# Patient Record
Sex: FEMALE | Race: OTHER | NOT HISPANIC OR LATINO | Employment: OTHER | URBAN - METROPOLITAN AREA
[De-identification: names, ages, dates, MRNs, and addresses within clinical notes are randomized per-mention and may not be internally consistent; named-entity substitution may affect disease eponyms.]

---

## 2017-03-01 ENCOUNTER — NURSE TRIAGE (OUTPATIENT)
Dept: ADMINISTRATIVE | Facility: CLINIC | Age: 67
End: 2017-03-01

## 2017-03-01 NOTE — TELEPHONE ENCOUNTER
Reason for Disposition   Information only question and nurse able to answer    Protocols used: ST NO PROTOCOL AVAILABLE - INFORMATION ONLY-A-[place]    Patient is a former [facility] patient and is requesting a model number for her stent that was done in 2013.  Advised patient that the hospital is closed and [employer] record management was handling the doctors and not the patient.  Advised patient to try to contact Dr. Keanu Higgins's office.

## 2023-08-09 ENCOUNTER — OFFICE VISIT (OUTPATIENT)
Dept: FAMILY MEDICINE | Facility: CLINIC | Age: 73
End: 2023-08-09
Payer: MEDICARE

## 2023-08-09 VITALS
TEMPERATURE: 98 F | WEIGHT: 160 LBS | SYSTOLIC BLOOD PRESSURE: 138 MMHG | BODY MASS INDEX: 28.35 KG/M2 | DIASTOLIC BLOOD PRESSURE: 75 MMHG | HEIGHT: 63 IN | HEART RATE: 65 BPM | OXYGEN SATURATION: 96 % | RESPIRATION RATE: 18 BRPM

## 2023-08-09 DIAGNOSIS — Z00.00 ANNUAL PHYSICAL EXAM: ICD-10-CM

## 2023-08-09 DIAGNOSIS — I50.9 CONGESTIVE HEART FAILURE, UNSPECIFIED HF CHRONICITY, UNSPECIFIED HEART FAILURE TYPE: ICD-10-CM

## 2023-08-09 DIAGNOSIS — I10 PRIMARY HYPERTENSION: ICD-10-CM

## 2023-08-09 DIAGNOSIS — I65.03 STENOSIS OF BOTH VERTEBRAL ARTERIES: ICD-10-CM

## 2023-08-09 DIAGNOSIS — G40.309 GENERALIZED CONVULSIVE EPILEPSY: ICD-10-CM

## 2023-08-09 DIAGNOSIS — I25.10 ARTERIOSCLEROSIS OF CORONARY ARTERY: Primary | ICD-10-CM

## 2023-08-09 DIAGNOSIS — R26.81 GAIT INSTABILITY: ICD-10-CM

## 2023-08-09 PROCEDURE — 99999 PR PBB SHADOW E&M-EST. PATIENT-LVL V: CPT | Mod: PBBFAC,,, | Performed by: STUDENT IN AN ORGANIZED HEALTH CARE EDUCATION/TRAINING PROGRAM

## 2023-08-09 PROCEDURE — 99215 OFFICE O/P EST HI 40 MIN: CPT | Mod: PBBFAC,PO | Performed by: STUDENT IN AN ORGANIZED HEALTH CARE EDUCATION/TRAINING PROGRAM

## 2023-08-09 PROCEDURE — 99387 INIT PM E/M NEW PAT 65+ YRS: CPT | Mod: S$PBB,GZ,, | Performed by: STUDENT IN AN ORGANIZED HEALTH CARE EDUCATION/TRAINING PROGRAM

## 2023-08-09 PROCEDURE — 99999 PR PBB SHADOW E&M-EST. PATIENT-LVL V: ICD-10-PCS | Mod: PBBFAC,,, | Performed by: STUDENT IN AN ORGANIZED HEALTH CARE EDUCATION/TRAINING PROGRAM

## 2023-08-09 PROCEDURE — 99387 PR PREVENTIVE VISIT,NEW,65 & OVER: ICD-10-PCS | Mod: S$PBB,GZ,, | Performed by: STUDENT IN AN ORGANIZED HEALTH CARE EDUCATION/TRAINING PROGRAM

## 2023-08-09 NOTE — PROGRESS NOTES
Problem List Items Addressed This Visit          Neuro    Generalized convulsive epilepsy    Overview     1 episode several years ago, unknown etiology ?TIA/CVA  She hasn't been on antiepileptics   -referral to neurology     MRA Brain 2022 at [place]   IMPRESSION:   No large vessel occlusion.     Focal stenoses in the cavernous segment of the right internal carotid artery, supraclinoid segment of the left internal carotid artery, proximal M1 segment of the right middle cerebral artery, distal V4 segment of the right vertebral artery, and proximal   basilar artery. Tandem stenoses are also noted in the posterior cerebral arteries bilaterally.          MRI brain 2022 at [facility]   Impression    No acute intracranial abnormality.     Mild deep white matter leukomalacia of chronic microvascular disease.     Development of a right mastoid effusion.                 Stenosis of both vertebral arteries    Overview     ?TIA/CVA  - referral to neuro radiology  - referral to neurology     MRA Brain 2022 at [facility]   IMPRESSION:   No large vessel occlusion.     Focal stenoses in the cavernous segment of the right internal carotid artery, supraclinoid segment of the left internal carotid artery, proximal M1 segment of the right middle cerebral artery, distal V4 segment of the right vertebral artery, and proximal   basilar artery. Tandem stenoses are also noted in the posterior cerebral arteries bilaterally.          MRI brain 2022 at [facility]   Impression    No acute intracranial abnormality.     Mild deep white matter leukomalacia of chronic microvascular disease.     Development of a right mastoid effusion.               Relevant Orders    Ambulatory referral/consult to Interventional Radiology    US Carotid Bilateral       Cardiac/Vascular    CCF (congestive cardiac failure)    Relevant Orders    Ambulatory referral/consult to Cardiology    US Carotid Bilateral    Arteriosclerosis of coronary artery - Primary     Overview     Intolerant of statins   Referral to cards          Relevant Orders    Ambulatory referral/consult to Cardiology    Hypertension    Overview     -at goal today  - Current Hypertension Medications:   Hypertension Medications               amlodipine (NORVASC) 5 MG tablet Take 1 tablet (5 mg total) by mouth once daily.    cloNIDine (CATAPRES) 0.2 MG tablet Take 1 tablet (0.2 mg total) by mouth as needed.    losartan (COZAAR) 100 MG tablet 1 po daily    metoprolol succinate (TOPROL XL) 50 MG 24 hr tablet 1 and 1/2 in the AM.  1 and 1/2 at night.    NITROSTAT 0.4 mg SL tablet    -continue lifestyle modification with low sodium diet and exercise   -discussed hypertension disease course and importance of treating high blood pressure  -patient understood and advised of risk of untreated blood pressure.  ER precautions were given   for symptoms of hypertensive urgency and emergency.              Other    Annual physical exam    Overview     Complete history and physical was completed today.    Complete and thorough medication reconciliation was performed. Discussed risks and benefits of medications.    Advised patient on orders and health maintenance.    Continue current medications listed on your summary sheet.    All questions were answered.   Follow up as planned or prn             Other Visit Diagnoses       Gait instability        Relevant Medications    walker Misc    Other Relevant Orders    Ambulatory referral/consult to Neurology    Ambulatory referral/consult to Physical/Occupational Therapy              Patient ID: Paola Wood is a 73 y.o. female.    Chief Complaint:  establish care    Patient is here to establish care. Has a hx of  has a past medical history of Allergy, AR (allergic rhinitis), Asthma, CAD (coronary artery disease), CHF (congestive heart failure), Chronic pain, Hypertension, and Seizures.     She is here with her daughter.     Reports 1 episode several years ago, unknown etiology  ?TIA/CVA  She hasn't been on antiepileptics.    Otherwise, patient has been feeling well. No additional concerns. Denies nausea, vomiting, fevers, chills, abdominal pain, fatigue.     Health Maintenance Topics with due status: Not Due       Topic Last Completion Date    Colorectal Cancer Screening 04/13/2015    Lipid Panel 01/01/2023    Influenza Vaccine Not Due        ==============================================  History reviewed.   Health Maintenance Due   Topic Date Due    Hepatitis C Screening  Never done    TETANUS VACCINE  Never done    High Dose Statin  Never done    DEXA Scan  Never done    Pneumococcal Vaccines (Age 65+) (1 - PCV) Never done    COVID-19 Vaccine (4 - Pfizer series) 01/05/2022    Mammogram  08/23/2023       Past Medical History:  Past Medical History:   Diagnosis Date    Allergy     AR (allergic rhinitis)     Asthma     CAD (coronary artery disease)     CHF (congestive heart failure)     Chronic pain     neck/back/arm/knee    Hypertension     Seizures      Past Surgical History:   Procedure Laterality Date    CATARACT EXTRACTION      HYSTERECTOMY  1983    partial (Ovaries removed 1998)    OOPHORECTOMY  1998     Review of patient's allergies indicates:   Allergen Reactions    Ace inhibitors Swelling     Other reaction(s): tongue swelling  Other reaction(s): Other (See Comments)  Other reaction(s): tongue swelling      Bacitracin     Ezetimibe     Lisinopril     Neomycin     Niacin     Polymyxin b     Atorvastatin Rash    Rosuvastatin Nausea And Vomiting    Statins-hmg-coa reductase inhibitors Nausea And Vomiting     Current Outpatient Medications on File Prior to Visit   Medication Sig Dispense Refill    amlodipine (NORVASC) 5 MG tablet Take 1 tablet (5 mg total) by mouth once daily. 90 tablet 1    aspirin (ECOTRIN) 81 MG EC tablet Take 81 mg by mouth once daily.      butalbital-aspirin-caffeine -40 mg (FIORINAL) -40 mg Cap Take 1 capsule by mouth 2 (two) times daily as needed.  30 capsule 0    cloNIDine (CATAPRES) 0.2 MG tablet Take 1 tablet (0.2 mg total) by mouth as needed. 30 tablet 0    clopidogrel (PLAVIX) 75 mg tablet Take 75 mg by mouth once daily.  5    cyproheptadine (PERIACTIN) 4 mg tablet Take 4 mg by mouth 3 (three) times daily as needed.  5    folic acid (FOLVITE) 1 MG tablet Take 1 tablet (1 mg total) by mouth once daily. FOLIC ACID 1 MG TABS 90 tablet 1    LATISSE 0.03 % ophthalmic solution APPLY with applicator AT BEDTIME  2    losartan (COZAAR) 100 MG tablet 1 po daily 90 tablet 1    metoprolol succinate (TOPROL XL) 50 MG 24 hr tablet 1 and 1/2 in the AM.  1 and 1/2 at night. 270 tablet 1    mirtazapine (REMERON) 7.5 MG Tab Take 1 tablet (7.5 mg total) by mouth every evening. 90 tablet 0    morphine (MS CONTIN) 30 MG 12 hr tablet Take 1 tablet by mouth every 12 (twelve) hours.  0    NITROSTAT 0.4 mg SL tablet   2    oxycodone (OXYCONTIN) 10 mg 12 hr tablet Take 10 mg by mouth 2 (two) times daily.  0    oxyCODONE-acetaminophen (PERCOCET)  mg per tablet Take 1 tablet by mouth every 4 (four) hours as needed for Pain. 8 tablet 0    pantoprazole (PROTONIX) 40 MG tablet Take 1 tablet (40 mg total) by mouth once daily. PROTONIX 40 MG TBEC 90 tablet 1    zolpidem (AMBIEN) 5 MG Tab 1 po q hs for one week then 1/2 po q hs for one week then 1/4 po q hs prn.  Wean off medication. 30 tablet 0     No current facility-administered medications on file prior to visit.     Social History     Socioeconomic History    Marital status:    Tobacco Use    Smoking status: Never    Smokeless tobacco: Never   Substance and Sexual Activity    Alcohol use: No    Drug use: No     Family History   Problem Relation Age of Onset    Heart disease Mother     Heart disease Father     No Known Problems Maternal Grandmother     No Known Problems Maternal Grandfather     No Known Problems Paternal Grandmother     No Known Problems Paternal Grandfather     Breast cancer Sister 53          Review of  Systems   12 point review of systems per hpi, otherwise negative         Objective:    Nursing note and vitals reviewed.  Vitals:    08/09/23 0942   BP: 138/75   Pulse: 65   Resp: 18   Temp: 97.8 °F (36.6 °C)     Body mass index is 28.34 kg/m².     Physical Exam   Constitutional: SHE is oriented to person, place, and time. She appears well-developed and well-nourished. No distress.   HENT: WNL  Head: Normocephalic and atraumatic.   Eyes: Pupils are equal, round, and reactive to light. EOM are normal.   Neck: Normal range of motion. Neck supple.   Cardiovascular: Normal rate, regular rhythm, normal heart sounds and intact distal pulses.   No murmur heard.  Pulmonary/Chest: Effort normal and breath sounds normal. No respiratory distress. She has no wheezes.   GI: soft, non distended, no ttp, no rebound/guarding  Musculoskeletal: Normal range of motion. She exhibits no edema.   Neurological: She is alert and oriented to person, place, and time. No cranial nerve deficit.   Skin: Skin is warm and dry. Capillary refill takes less than 2 seconds.   Psychiatric: She has a normal mood and affect. Her behavior is normal.           Grace Duron MD    We Offer Telehealth & Same Day Appointments!   Book your Telehealth appointment with me through my nurse or   Clinic appointments on [employer]!  [phone]     To Schedule appointments online, go to [employer]: [email or web address]

## 2023-08-11 PROBLEM — I65.03 STENOSIS OF BOTH VERTEBRAL ARTERIES: Status: ACTIVE | Noted: 2023-08-11

## 2023-08-14 PROBLEM — Z00.00 ANNUAL PHYSICAL EXAM: Status: ACTIVE | Noted: 2023-08-14

## 2023-08-18 DIAGNOSIS — Z76.89 ESTABLISHING CARE WITH NEW DOCTOR, ENCOUNTER FOR: Primary | ICD-10-CM

## 2023-08-23 ENCOUNTER — TELEPHONE (OUTPATIENT)
Dept: CARDIOLOGY | Facility: CLINIC | Age: 73
End: 2023-08-23
Payer: MEDICARE

## 2023-08-23 NOTE — TELEPHONE ENCOUNTER
----- Message from Mahnaz Tatum sent at 8/23/2023 11:15 AM CDT -----  Regarding: call back to schedule additional testing  Type: Patient Call Back    Who called: Paola    What is the request in detail: the patient is requesting a call back from the staff. She stated that she received a call to reschedule some additional testing, but is not 100% sure what additional testing is needed. Please return call at earliest convenience.    Can the clinic reply by [facility]?no     Would the patient rather a call back or a response via [facility]? Call back    Best call back number:[phone]

## 2023-08-23 NOTE — TELEPHONE ENCOUNTER
Followed up with Paola, patient was calling to get her ultrasound appointment rescheduled, but after some discussion decided to have her daughter call me back to schedule her appointment.

## 2023-09-08 PROCEDURE — G0180 MD CERTIFICATION HHA PATIENT: HCPCS | Mod: ,,, | Performed by: STUDENT IN AN ORGANIZED HEALTH CARE EDUCATION/TRAINING PROGRAM

## 2023-09-08 PROCEDURE — G0180 PR HOME HEALTH MD CERTIFICATION: ICD-10-PCS | Mod: ,,, | Performed by: STUDENT IN AN ORGANIZED HEALTH CARE EDUCATION/TRAINING PROGRAM

## 2023-09-12 ENCOUNTER — TELEPHONE (OUTPATIENT)
Dept: FAMILY MEDICINE | Facility: CLINIC | Age: 73
End: 2023-09-12
Payer: MEDICARE

## 2023-09-12 NOTE — TELEPHONE ENCOUNTER
I have attempted to contact this patient by phone with the following results: no answer, left message to return my call on answering machine.

## 2023-09-12 NOTE — TELEPHONE ENCOUNTER
----- Message from Jaimie Mcnulty sent at 9/12/2023  8:44 AM CDT -----  Contact: NEEL  .Type:  Patient Returning Call    Who Called: neel   Who Left Message for Patient:nurse  Does the patient know what this is regarding?:no  Would the patient rather a call back or a response via [facility]? Call   Best Call Back Number: .[phone]

## 2023-09-18 ENCOUNTER — TELEPHONE (OUTPATIENT)
Dept: NEUROLOGY | Facility: CLINIC | Age: 73
End: 2023-09-18
Payer: MEDICARE

## 2023-09-18 NOTE — TELEPHONE ENCOUNTER
----- Message from Marissa Anaya RN sent at 9/18/2023 10:50 AM CDT -----  Regarding: [facility] ER visit f/u 9/15/2023 Dx: Confusion, AMS  Please call patient's daughter, Alice Wood at [phone] to schedule an appointment for the patient. She has a NeuroVascular appointment on the [facility] on 9/26/2023 but ER Physician and family are requesting a Neurology appointment for her new onset confusion/AMS.     Thanks,    Marissa Anaya, RN, BSN  ED Navigator/Case Management  [phone]

## 2023-09-27 ENCOUNTER — DOCUMENT SCAN (OUTPATIENT)
Dept: HOME HEALTH SERVICES | Facility: HOSPITAL | Age: 73
End: 2023-09-27
Payer: MEDICARE

## 2023-09-28 ENCOUNTER — DOCUMENT SCAN (OUTPATIENT)
Dept: HOME HEALTH SERVICES | Facility: HOSPITAL | Age: 73
End: 2023-09-28
Payer: MEDICARE

## 2023-09-29 ENCOUNTER — EXTERNAL HOME HEALTH (OUTPATIENT)
Dept: HOME HEALTH SERVICES | Facility: HOSPITAL | Age: 73
End: 2023-09-29
Payer: MEDICARE

## 2023-10-20 ENCOUNTER — TELEPHONE (OUTPATIENT)
Dept: FAMILY MEDICINE | Facility: CLINIC | Age: 73
End: 2023-10-20
Payer: MEDICARE

## 2023-10-20 NOTE — TELEPHONE ENCOUNTER
----- Message from Yaima Price sent at 10/20/2023 11:58 AM CDT -----  Contact: [phone]    Patient: Paola Wood-    Reason: The patient is requesting a call back from the nurse to get assistance with scheduling an     appointment.    Comments: Please call the patient back to advise.

## 2023-10-25 ENCOUNTER — OFFICE VISIT (OUTPATIENT)
Dept: FAMILY MEDICINE | Facility: CLINIC | Age: 73
End: 2023-10-25
Payer: COMMERCIAL

## 2023-10-25 VITALS
BODY MASS INDEX: 27.23 KG/M2 | OXYGEN SATURATION: 99 % | DIASTOLIC BLOOD PRESSURE: 83 MMHG | WEIGHT: 153.69 LBS | HEART RATE: 58 BPM | RESPIRATION RATE: 18 BRPM | SYSTOLIC BLOOD PRESSURE: 135 MMHG | HEIGHT: 63 IN | TEMPERATURE: 98 F

## 2023-10-25 DIAGNOSIS — G89.29 OTHER CHRONIC PAIN: ICD-10-CM

## 2023-10-25 DIAGNOSIS — Z12.31 ENCOUNTER FOR SCREENING MAMMOGRAM FOR MALIGNANT NEOPLASM OF BREAST: ICD-10-CM

## 2023-10-25 DIAGNOSIS — Z78.0 POSTMENOPAUSAL: ICD-10-CM

## 2023-10-25 DIAGNOSIS — R29.6 RECURRENT FALLS: ICD-10-CM

## 2023-10-25 DIAGNOSIS — I25.10 ARTERIOSCLEROSIS OF CORONARY ARTERY: Primary | ICD-10-CM

## 2023-10-25 DIAGNOSIS — Z78.9 STATIN INTOLERANCE: ICD-10-CM

## 2023-10-25 DIAGNOSIS — Z23 IMMUNIZATION DUE: ICD-10-CM

## 2023-10-25 PROCEDURE — 99999 PR PBB SHADOW E&M-EST. PATIENT-LVL V: ICD-10-PCS | Mod: PBBFAC,,, | Performed by: STUDENT IN AN ORGANIZED HEALTH CARE EDUCATION/TRAINING PROGRAM

## 2023-10-25 PROCEDURE — 99999PBSHW FLU VACCINE - QUADRIVALENT - ADJUVANTED: Mod: PBBFAC,,,

## 2023-10-25 PROCEDURE — 99214 OFFICE O/P EST MOD 30 MIN: CPT | Mod: S$PBB,,, | Performed by: STUDENT IN AN ORGANIZED HEALTH CARE EDUCATION/TRAINING PROGRAM

## 2023-10-25 PROCEDURE — 99999PBSHW FLU VACCINE - QUADRIVALENT - ADJUVANTED: ICD-10-PCS | Mod: PBBFAC,,,

## 2023-10-25 PROCEDURE — 99214 PR OFFICE/OUTPT VISIT, EST, LEVL IV, 30-39 MIN: ICD-10-PCS | Mod: S$PBB,,, | Performed by: STUDENT IN AN ORGANIZED HEALTH CARE EDUCATION/TRAINING PROGRAM

## 2023-10-25 PROCEDURE — 99999PBSHW PR PBB SHADOW TECHNICAL ONLY FILED TO HB: Mod: PBBFAC,PO,,

## 2023-10-25 PROCEDURE — 99215 OFFICE O/P EST HI 40 MIN: CPT | Mod: PBBFAC,PO | Performed by: STUDENT IN AN ORGANIZED HEALTH CARE EDUCATION/TRAINING PROGRAM

## 2023-10-25 PROCEDURE — 99999 PR PBB SHADOW E&M-EST. PATIENT-LVL V: CPT | Mod: PBBFAC,,, | Performed by: STUDENT IN AN ORGANIZED HEALTH CARE EDUCATION/TRAINING PROGRAM

## 2023-10-25 PROCEDURE — G0008 ADMIN INFLUENZA VIRUS VAC: HCPCS | Mod: PBBFAC,PO

## 2023-10-25 RX ORDER — AMITRIPTYLINE HYDROCHLORIDE 150 MG/1
150 TABLET ORAL NIGHTLY
COMMUNITY
Start: 2023-09-06

## 2023-10-25 RX ORDER — CHLORTHALIDONE 25 MG/1
25 TABLET ORAL EVERY MORNING
COMMUNITY
Start: 2023-10-03

## 2023-10-25 RX ORDER — HYDRALAZINE HYDROCHLORIDE 50 MG/1
50 TABLET, FILM COATED ORAL 3 TIMES DAILY
COMMUNITY
Start: 2023-09-06

## 2023-10-25 RX ORDER — ISOSORBIDE MONONITRATE 30 MG/1
30 TABLET, EXTENDED RELEASE ORAL EVERY MORNING
COMMUNITY
Start: 2023-09-07

## 2023-10-25 RX ORDER — METHOCARBAMOL 500 MG/1
500 TABLET, FILM COATED ORAL 4 TIMES DAILY
COMMUNITY
Start: 2023-10-24 | End: 2023-11-03

## 2023-10-25 RX ORDER — POTASSIUM CHLORIDE 20 MEQ/1
20 TABLET, EXTENDED RELEASE ORAL DAILY
COMMUNITY
Start: 2023-09-06

## 2023-10-25 RX ORDER — OXYCODONE AND ACETAMINOPHEN 10; 325 MG/1; MG/1
1 TABLET ORAL EVERY 4 HOURS PRN
COMMUNITY

## 2023-10-25 RX ORDER — FUROSEMIDE 20 MG/1
20 TABLET ORAL
COMMUNITY
Start: 2023-10-03

## 2023-10-25 RX ORDER — GEMFIBROZIL 600 MG/1
600 TABLET, FILM COATED ORAL 2 TIMES DAILY
COMMUNITY
Start: 2023-10-03 | End: 2024-02-07

## 2023-10-25 RX ORDER — TOPIRAMATE SPINKLE 15 MG/1
15 CAPSULE ORAL EVERY 12 HOURS
COMMUNITY
Start: 2023-10-03 | End: 2023-10-25

## 2023-10-25 RX ORDER — LUBIPROSTONE 24 UG/1
1 CAPSULE ORAL 2 TIMES DAILY
COMMUNITY
End: 2023-10-25

## 2023-10-25 NOTE — PROGRESS NOTES
Problem List Items Addressed This Visit          Neuro    Chronic pain    Overview     Follows with dr. Medina for chronic back pain             Cardiac/Vascular    Arteriosclerosis of coronary artery - Primary    Overview     Intolerant of statins   Referral to cards          Relevant Medications    alirocumab (PRALUENT PEN) 75 mg/mL PnIj    Other Relevant Orders    Ambulatory referral/consult to Cardiology       Other    Recurrent falls    Overview     Reports weekly falls over past few months  Referral to PT home health  Has walker and cane; however, feels they do not support her well  She is on plavix for cad s/p pci. She needs to discuss plavix use with cards  Ct head oct 2023 unremarkable    We reviewed meds: she opposed to dc'ing amitriptyline for sleep, I advised I would not be filling narcotic pain meds in setting of recurrent falls. Clonidine makes her sleepy (takes for insomnia; however, had been taking bid). She will take clonidine hs now         Relevant Orders    Ambulatory referral/consult to Home Health    Statin intolerance    Overview     -chronic condition. Statin intolerance. Sent praluent  Referral to cards to follow up      Hyperlipidemia Medications               alirocumab (PRALUENT PEN) 75 mg/mL PnIj Inject 1 mL (75 mg total) into the skin every 14 (fourteen) days.    gemfibroziL (LOPID) 600 MG tablet Take 600 mg by mouth 2 (two) times daily.       Lab Results   Component Value Date    CHOL 381 (H) 08/27/2023    CHOL 251 (H) 01/01/2023    CHOL 274 (H) 06/21/2022     Lab Results   Component Value Date    HDL 53 08/27/2023    HDL 48 01/01/2023    HDL 39 06/21/2022     Lab Results   Component Value Date    LDLCALC 290 (H) 08/27/2023    LDLCALC 183 (H) 01/01/2023    LDLCALC 185 (H) 06/21/2022     Lab Results   Component Value Date    TRIG 191 08/27/2023    TRIG 99 01/01/2023    TRIG 252 (H) 06/21/2022     Lab Results   Component Value Date    CHOLHDL 7.2 (H) 08/27/2023    CHOLHDL 5.2 (H)  "01/01/2023    CHOLHDL 7.0 (H) 06/21/2022          The ASCVD Risk score (Daniel JUNE, et al., 2019) failed to calculate for the following reasons:    The valid total cholesterol range is 130 to 320 mg/dL           Relevant Medications    alirocumab (PRALUENT PEN) 75 mg/mL PnIj    Other Relevant Orders    Ambulatory referral/consult to Cardiology     Other Visit Diagnoses       Immunization due        Relevant Orders    Influenza (FLUAD) - Quadrivalent (Adjuvanted) *Preferred* (65+) (PF) (Completed)    Postmenopausal        Relevant Orders    DXA Bone Density Axial Skeleton 1 or more sites    Encounter for screening mammogram for malignant neoplasm of breast        Relevant Orders    Mammo Digital Screening Bilat w/ Marciano                Patient ID: Paola Wood is a 73 y.o. female.    Chief Complaint:  ED follow up    Has a hx of  has a past medical history of Allergy, AR (allergic rhinitis), Asthma, CAD (coronary artery disease), CHF (congestive heart failure), Chronic pain, Hypertension, and Seizures.     Recurrent falls, most recently last week. She was seen at [place] [facility]    Per chart review:  "73-year-old history of TIA, hypertension, hyperlipidemia, presents for evaluation of neck pain. Patient suffers from frequent falls. She fell 6 days ago and has been having pain to her neck. She has a history of previous neck surgery. Also she has been having difficulty processing her thoughts. She tells me she is on aspirin and Plavix. No new focal weakness or numbness...73-year-old patient presented for evaluation of head pain and neck pain following a fall 6 days prior. CT scans were obtained which were unremarkable. On physical exam patient had no focal deficits. Physical exam otherwise unremarkable. Suspect injuries to be soft tissue. Patient will be prescribed a muscle relaxer and she can follow-up with her neck doctor."      Reports 1 episode sz several years ago, unknown etiology ?TIA/CVA  She hasn't been on " antiepileptics.    Polypharmacy: amitriptyline for insomnia, had been taking clonidine bid- will take prn htn    Otherwise, patient has been feeling well. No additional concerns. Denies nausea, vomiting, fevers, chills, abdominal pain.      Health Maintenance Topics with due status: Not Due       Topic Last Completion Date    Colorectal Cancer Screening 04/13/2015    Lipid Panel 08/27/2023        ==============================================  History reviewed.   Health Maintenance Due   Topic Date Due    Hepatitis C Screening  Never done    TETANUS VACCINE  Never done    High Dose Statin  Never done    DEXA Scan  Never done    Pneumococcal Vaccines (Age 65+) (1 - PCV) Never done    Mammogram  08/23/2023    COVID-19 Vaccine (7 - 2023-24 season) 09/01/2023       Past Medical History:  Past Medical History:   Diagnosis Date    Allergy     AR (allergic rhinitis)     Asthma     CAD (coronary artery disease)     CHF (congestive heart failure)     Chronic pain     neck/back/arm/knee    Hypertension     Seizures      Past Surgical History:   Procedure Laterality Date    CATARACT EXTRACTION      HYSTERECTOMY  1983    partial (Ovaries removed 1998)    OOPHORECTOMY  1998     Review of patient's allergies indicates:   Allergen Reactions    Ace inhibitors Swelling     Other reaction(s): tongue swelling  Other reaction(s): Other (See Comments)  Other reaction(s): tongue swelling      Bacitracin     Ezetimibe     Lisinopril      Other reaction(s): Not available    Neomycin     Niacin     Polymyxin b     Atorvastatin Rash    Rosuvastatin Nausea And Vomiting    Statins-hmg-coa reductase inhibitors Nausea And Vomiting     Other reaction(s): Not available     Current Outpatient Medications on File Prior to Visit   Medication Sig Dispense Refill    amitriptyline (ELAVIL) 150 MG Tab Take 150 mg by mouth every evening.      aspirin (ECOTRIN) 81 MG EC tablet Take 81 mg by mouth once daily.      butalbital-aspirin-caffeine -40 mg  (FIORINAL) -40 mg Cap Take 1 capsule by mouth 2 (two) times daily as needed. 30 capsule 0    chlorthalidone (HYGROTEN) 25 MG Tab Take 25 mg by mouth every morning.      cloNIDine (CATAPRES) 0.2 MG tablet Take 1 tablet (0.2 mg total) by mouth as needed. 30 tablet 0    clopidogrel (PLAVIX) 75 mg tablet Take 75 mg by mouth once daily.  5    cyproheptadine (PERIACTIN) 4 mg tablet Take 4 mg by mouth 3 (three) times daily as needed.  5    folic acid (FOLVITE) 1 MG tablet Take 1 tablet (1 mg total) by mouth once daily. FOLIC ACID 1 MG TABS 90 tablet 1    furosemide (LASIX) 20 MG tablet Take 20 mg by mouth.      gemfibroziL (LOPID) 600 MG tablet Take 600 mg by mouth 2 (two) times daily.      hydrALAZINE (APRESOLINE) 50 MG tablet Take 50 mg by mouth 3 (three) times daily.      isosorbide mononitrate (IMDUR) 30 MG 24 hr tablet Take 30 mg by mouth every morning.      losartan (COZAAR) 100 MG tablet 1 po daily 90 tablet 1    methocarbamoL (ROBAXIN) 500 MG Tab Take 500 mg by mouth 4 (four) times daily.      metoprolol succinate (TOPROL XL) 50 MG 24 hr tablet 1 and 1/2 in the AM.  1 and 1/2 at night. 270 tablet 1    NITROSTAT 0.4 mg SL tablet   2    oxyCODONE-acetaminophen (PERCOCET)  mg per tablet Take 1 tablet by mouth every 4 (four) hours as needed.      pantoprazole (PROTONIX) 40 MG tablet Take 1 tablet (40 mg total) by mouth once daily. PROTONIX 40 MG TBEC 90 tablet 1    potassium chloride SA (K-DUR,KLOR-CON) 20 MEQ tablet Take 20 mEq by mouth once daily.      walker Misc 1 each by Misc.(Non-Drug; Combo Route) route once daily. Rollator 1 each 1    amlodipine (NORVASC) 5 MG tablet Take 1 tablet (5 mg total) by mouth once daily. 90 tablet 1     No current facility-administered medications on file prior to visit.     Social History     Socioeconomic History    Marital status:    Tobacco Use    Smoking status: Never    Smokeless tobacco: Never   Substance and Sexual Activity    Alcohol use: No    Drug use: No      Family History   Problem Relation Age of Onset    Heart disease Mother     Heart disease Father     No Known Problems Maternal Grandmother     No Known Problems Maternal Grandfather     No Known Problems Paternal Grandmother     No Known Problems Paternal Grandfather     Breast cancer Sister 53          Review of Systems   12 point review of systems per hpi, otherwise negative         Objective:    Nursing note and vitals reviewed.  Vitals:    10/25/23 1458   BP: 135/83   Pulse: (!) 58   Resp: 18   Temp: 98.4 °F (36.9 °C)     Body mass index is 27.23 kg/m².     Physical Exam   Constitutional: oriented to person, place, and time. well-developed and well-nourished. No distress.   HENT: WNL  Head: Normocephalic and atraumatic.   Eyes: EOM are normal.   Neck: Normal range of motion. Neck supple.   Cardiovascular: Normal rate  Pulmonary/Chest: Effort normal. No respiratory distress.   GI: soft, non distended, no ttp, no rebound/guarding  Musculoskeletal: Normal range of motion. no edema. Has walker, ecchymosis to ble (recent falls)  Neurological: CN II-XII intact  Skin: warm and dry.   Psychiatric: normal mood and affect. behavior is normal.           Grace Duron MD    We Offer Telehealth & Same Day Appointments!   Book your Telehealth appointment with me through my nurse or   Clinic appointments on [employer]!  [phone]     To Schedule appointments online, go to [employer]: [email or web address]

## 2023-10-25 NOTE — PATIENT INSTRUCTIONS
Dr. DYLAN CHEN   [facility]  [address] [place] [address].  [place] [address]  [phone]

## 2023-10-26 PROBLEM — R29.6 RECURRENT FALLS: Status: ACTIVE | Noted: 2023-10-26

## 2023-10-26 PROBLEM — Z78.9 STATIN INTOLERANCE: Status: ACTIVE | Noted: 2023-10-26

## 2023-11-06 ENCOUNTER — OFFICE VISIT (OUTPATIENT)
Dept: CARDIOLOGY | Facility: CLINIC | Age: 73
End: 2023-11-06
Payer: MEDICARE

## 2023-11-06 ENCOUNTER — TELEPHONE (OUTPATIENT)
Dept: CARDIOLOGY | Facility: CLINIC | Age: 73
End: 2023-11-06

## 2023-11-06 VITALS
WEIGHT: 149.69 LBS | HEART RATE: 62 BPM | BODY MASS INDEX: 26.52 KG/M2 | DIASTOLIC BLOOD PRESSURE: 60 MMHG | SYSTOLIC BLOOD PRESSURE: 100 MMHG | OXYGEN SATURATION: 99 % | HEIGHT: 63 IN

## 2023-11-06 DIAGNOSIS — R42 DIZZINESS: ICD-10-CM

## 2023-11-06 DIAGNOSIS — Z78.9 STATIN INTOLERANCE: ICD-10-CM

## 2023-11-06 DIAGNOSIS — E78.5 HYPERLIPIDEMIA, UNSPECIFIED HYPERLIPIDEMIA TYPE: ICD-10-CM

## 2023-11-06 DIAGNOSIS — G89.29 OTHER CHRONIC PAIN: Primary | ICD-10-CM

## 2023-11-06 DIAGNOSIS — I25.10 ARTERIOSCLEROSIS OF CORONARY ARTERY: ICD-10-CM

## 2023-11-06 DIAGNOSIS — Z95.1 S/P CABG (CORONARY ARTERY BYPASS GRAFT): ICD-10-CM

## 2023-11-06 DIAGNOSIS — F41.1 GENERALIZED ANXIETY DISORDER: ICD-10-CM

## 2023-11-06 DIAGNOSIS — Z98.61 S/P PTCA (PERCUTANEOUS TRANSLUMINAL CORONARY ANGIOPLASTY): ICD-10-CM

## 2023-11-06 DIAGNOSIS — I65.03 STENOSIS OF BOTH VERTEBRAL ARTERIES: ICD-10-CM

## 2023-11-06 PROCEDURE — 99999 PR PBB SHADOW E&M-EST. PATIENT-LVL III: CPT | Mod: PBBFAC,,, | Performed by: INTERNAL MEDICINE

## 2023-11-06 PROCEDURE — 99999 PR PBB SHADOW E&M-EST. PATIENT-LVL III: ICD-10-PCS | Mod: PBBFAC,,, | Performed by: INTERNAL MEDICINE

## 2023-11-06 PROCEDURE — 99204 PR OFFICE/OUTPT VISIT, NEW, LEVL IV, 45-59 MIN: ICD-10-PCS | Mod: S$PBB,,, | Performed by: INTERNAL MEDICINE

## 2023-11-06 PROCEDURE — 99213 OFFICE O/P EST LOW 20 MIN: CPT | Mod: PBBFAC,PO | Performed by: INTERNAL MEDICINE

## 2023-11-06 PROCEDURE — 99204 OFFICE O/P NEW MOD 45 MIN: CPT | Mod: S$PBB,,, | Performed by: INTERNAL MEDICINE

## 2023-11-06 RX ORDER — TOPIRAMATE SPINKLE 15 MG/1
CAPSULE ORAL
COMMUNITY
Start: 2023-10-31 | End: 2024-03-19

## 2023-11-06 RX ORDER — RANOLAZINE 500 MG/1
500 TABLET, EXTENDED RELEASE ORAL 2 TIMES DAILY
COMMUNITY

## 2023-11-06 NOTE — PROGRESS NOTES
Subjective:   Patient ID:  Paola Wood is a 73 y.o. female who presents for evaluation of [facility]      HPI73 yo BF with multiple problems has been followed at [facility] who has limited knowledge of what meds she is taking. Has chronic pain, appears depressed and suffers from chronic dizziness. A note in chart states has vertebral artery occlusion and has been told vessels are too small for intervention. DEnies any CP. Limited activity because of chronic hip pain.     1. CAD s/p PTCA X 2 mRCA 8/10, 9/10; s/p CABG X 4v (LIMA-LAD-atretic, SVG-OM1/OM2, SVG-RCA) 2011; s/pPCI mLAD 3.0 X 12 mm Promus 4/13; s/p PCI SVG-PDA 3.5 X 15 mm Ab 1/3/2023       2. HTN         3. Hyperlipidemia familial.         4. Statin intolerance    CAD.  Underwent C with cardiology findings of left main and three-vessel CAD. 99% SVG to PDA presumed culprit. Successful PTCA and stenting.  She developed a right inguinal hematoma. Followed by cardiology.  CT angiogram of abdomen and pelvis due to a hematoma impression no aneurysmal dilation seen of the abdominal aorta.  Discharged to continue potassium, Toprol-XL, Imdur, Lasix, aspirin, Plavix and hydralazine.     Review of Systems   Constitutional: Positive for malaise/fatigue. Negative for decreased appetite, fever, weight gain and weight loss.   HENT:  Negative for hearing loss and nosebleeds.    Eyes:  Negative for visual disturbance.   Cardiovascular:  Positive for leg swelling. Negative for chest pain, claudication, cyanosis, dyspnea on exertion, irregular heartbeat, near-syncope, orthopnea, palpitations, paroxysmal nocturnal dyspnea and syncope.   Respiratory:  Negative for cough, hemoptysis, shortness of breath, sleep disturbances due to breathing, snoring and wheezing.    Endocrine: Negative for cold intolerance, heat intolerance, polydipsia and polyuria.   Hematologic/Lymphatic: Negative for adenopathy and bleeding problem. Does not bruise/bleed easily.  "  Skin:  Negative for color change, itching, poor wound healing, rash and suspicious lesions.   Musculoskeletal:  Positive for arthritis, back pain and myalgias. Negative for falls, joint pain, joint swelling, muscle cramps and muscle weakness.   Gastrointestinal:  Negative for bloating, abdominal pain, change in bowel habit, constipation, flatus, heartburn, hematemesis, hematochezia, hemorrhoids, jaundice, melena, nausea and vomiting.   Genitourinary:  Negative for bladder incontinence, decreased libido, frequency, hematuria, hesitancy and urgency.   Neurological:  Positive for dizziness, headaches and loss of balance. Negative for brief paralysis, difficulty with concentration, excessive daytime sleepiness, focal weakness, light-headedness, numbness, vertigo and weakness.   Psychiatric/Behavioral:  Negative for altered mental status, depression and memory loss. The patient does not have insomnia and is not nervous/anxious.    Allergic/Immunologic: Negative for environmental allergies, hives and persistent infections.       Objective:   Physical Exam  Vitals and nursing note reviewed.   Constitutional:       Appearance: She is well-developed.      Comments: /60   Pulse 62   Ht 5' 3" (1.6 m)   Wt 67.9 kg (149 lb 11.2 oz)   SpO2 99%   BMI 26.52 kg/m²      HENT:      Head: Normocephalic and atraumatic.      Right Ear: External ear normal.      Left Ear: External ear normal.      Nose: Nose normal.   Eyes:      General: Lids are normal. No scleral icterus.        Right eye: No discharge.         Left eye: No discharge.      Conjunctiva/sclera: Conjunctivae normal.      Right eye: No hemorrhage.     Pupils: Pupils are equal, round, and reactive to light.   Neck:      Thyroid: No thyromegaly.      Vascular: No JVD.      Trachea: No tracheal deviation.   Cardiovascular:      Rate and Rhythm: Normal rate and regular rhythm.      Pulses: Intact distal pulses.      Heart sounds: Normal heart sounds. No murmur " heard.     No friction rub. No gallop.      Comments: Midline scar  Pulmonary:      Effort: Pulmonary effort is normal. No respiratory distress.      Breath sounds: Normal breath sounds. No wheezing or rales.   Chest:      Chest wall: No tenderness.   Breasts:     Breasts are symmetrical.   Abdominal:      General: Bowel sounds are normal. There is no distension.      Palpations: Abdomen is soft. There is no hepatomegaly or mass.      Tenderness: There is no abdominal tenderness. There is no guarding or rebound.   Musculoskeletal:         General: No tenderness. Normal range of motion.      Cervical back: Normal range of motion and neck supple.   Lymphadenopathy:      Cervical: No cervical adenopathy.   Skin:     General: Skin is warm and dry.      Coloration: Skin is not pale.      Findings: No erythema or rash.   Neurological:      Mental Status: She is alert and oriented to person, place, and time.      Cranial Nerves: No cranial nerve deficit.      Coordination: Coordination normal.      Deep Tendon Reflexes: Reflexes are normal and symmetric. Reflexes normal.   Psychiatric:         Behavior: Behavior normal.         Thought Content: Thought content normal.         Judgment: Judgment normal.         Assessment:      1. Other chronic pain    2. Arteriosclerosis of coronary artery    3. Statin intolerance    4. Generalized anxiety disorder    5. S/P CABG (coronary artery bypass graft)    6. Dizziness    7. Hyperlipidemia, unspecified hyperlipidemia type    8. Stenosis of both vertebral arteries    9. S/P PTCA (percutaneous transluminal coronary angioplasty)        Plan:   Her Blood pressure and pulse stable   Do not feel the dizziness is directly related to a cardiac issue   Has seen Neurology and has another follow up   Will try to obtain records   No orders of the defined types were placed in this encounter.    Follow up in about 6 months (around 5/6/2024).

## 2023-11-06 NOTE — TELEPHONE ENCOUNTER
Cardio record request faxed to Dr. Francis Patrick with [facility] @ [phone], per Dr. Krause and pt's request.

## 2023-11-13 PROBLEM — Z00.00 ANNUAL PHYSICAL EXAM: Status: RESOLVED | Noted: 2023-08-14 | Resolved: 2023-11-13

## 2023-11-14 ENCOUNTER — HOSPITAL ENCOUNTER (OUTPATIENT)
Dept: RADIOLOGY | Facility: HOSPITAL | Age: 73
Discharge: HOME | End: 2023-11-14
Attending: STUDENT IN AN ORGANIZED HEALTH CARE EDUCATION/TRAINING PROGRAM
Payer: COMMERCIAL

## 2023-11-14 DIAGNOSIS — Z12.31 ENCOUNTER FOR SCREENING MAMMOGRAM FOR MALIGNANT NEOPLASM OF BREAST: ICD-10-CM

## 2023-11-14 DIAGNOSIS — Z78.0 POSTMENOPAUSAL: ICD-10-CM

## 2023-11-14 PROCEDURE — 77067 MAMMO DIGITAL SCREENING BILAT WITH TOMO: ICD-10-PCS | Mod: 26,,, | Performed by: RADIOLOGY

## 2023-11-14 PROCEDURE — 77063 MAMMO DIGITAL SCREENING BILAT WITH TOMO: ICD-10-PCS | Mod: 26,,, | Performed by: RADIOLOGY

## 2023-11-14 PROCEDURE — 77080 DXA BONE DENSITY AXIAL SKELETON 1 OR MORE SITES: ICD-10-PCS | Mod: 26,,, | Performed by: RADIOLOGY

## 2023-11-14 PROCEDURE — 77080 DXA BONE DENSITY AXIAL: CPT | Mod: TC,PO

## 2023-11-14 PROCEDURE — 77063 BREAST TOMOSYNTHESIS BI: CPT | Mod: 26,,, | Performed by: RADIOLOGY

## 2023-11-14 PROCEDURE — 77080 DXA BONE DENSITY AXIAL: CPT | Mod: 26,,, | Performed by: RADIOLOGY

## 2023-11-14 PROCEDURE — 77067 SCR MAMMO BI INCL CAD: CPT | Mod: 26,,, | Performed by: RADIOLOGY

## 2023-11-14 PROCEDURE — 77067 SCR MAMMO BI INCL CAD: CPT | Mod: TC,PO

## 2023-11-15 NOTE — PROGRESS NOTES
Results have been reviewed via [facility]. Please verify that these have been viewed by patient. If not, please call patient with results.  I have sent a msg to patient with the following interpretation (see below):    You had an abnormal DEXA scan of your bones which is showing osteopenia. This is the step before osteoporosis.  Please start weight bearing exercises to help reduce the risk of a fracture.  Also, please start Oscal 500+D, take 1 tab daily.  We will recheck your DEXA scan in 2 years.   Below is a handout on osteoporosis which is what we don't want you to get, so preventing it with the above recommendations is important.    Dr. Duron      Osteoporosis in Women: Keeping Your Bones Healthy and Strong  What is osteoporosis?   In osteoporosis, the inside of the bones becomes porous from a loss of calcium (see the picture below). This is called losing bone mass. Over time, this weakens the bones and makes them more likely to break.  Osteoporosis is much more common in women than in men. This is because women have less bone mass than men, tend to live longer and take in less calcium, and need the female hormone estrogen to keep their bones strong. If men live long enough, they are also at risk of getting osteoporosis later in life.  Once total bone mass has peaked-around age 35-all adults start to lose it. In women, the rate of bone loss speeds up after menopause, when estrogen levels fall. Since the ovaries make estrogen, faster bone loss may also occur if both ovaries are removed by surgery.    What are the signs of osteoporosis?  You may not know you have osteoporosis until you have serious signs. Signs include broken bones, low back pain or a hunched back. You may also get shorter over time because osteoporosis can cause your vertebrae (the bones in your spine) to collapse. These problems tend to occur after a lot of bone calcium has already been lost.  Am I at risk for osteoporosis?     Risk factors for  "osteoporosis  Menopause before age 48   Surgery to remove ovaries before menopause   Not getting enough calcium   Not getting enough exercise   Smoking   Osteoporosis in your family   Alcohol abuse   Thin body and small bone frame   Fair skin ( or  race)   Hyperthyroidism   Long-term use of oral steroids     See the box to the right for a list of things that put you at risk for osteoporosis. The more of these that apply to you, the higher your risk is. Talk to your family doctor about your risk factors.  Will I need a bone density test?  Check with your doctor. For many women, osteoporosis (or the risk of it) can be diagnosed without testing. When testing is appropriate, doctors use equipment that takes a "picture" of the bones to see if they are becoming porous.  What about hormone replacement therapy?  Hormone replacement therapy (HRT) is one way to prevent osteoporosis or keep it from getting worse.  In HRT, you take hormones (estrogen and progestin together, or estrogen alone) to counteract the drop in estrogen that happens at menopause or when the ovaries are removed by surgery.  Women who take HRT are at an increased risk for breast cancer, heart attack, stroke, serious blood clots and Alzheimer's disease.  Many physicians now recommend that their patients on HRT stop taking it to prevent osteoporosis.  Factors such as your health history and your family's health history will be important when weighing the risks and benefits of HRT. Talk to your doctor about whether it's right for you.  What is calcitonin?   Calcitonin (some brand names: Calcimar, Miacalcin) is a hormone that helps prevent further bone loss and reduces the pain that some people have with osteoporosis.  Calcitonin can be taken as a shot or as a nasal spray. Its most common side effect is nausea.  What is ibandronate sodium?  Ibandronate sodium (brand name: Boniva) is a new drug that is taken once a month. It is not a hormone, but " it slows bone loss and increases bone density. Some of the possible side effects include upset stomach, heartburn, nausea and diarrhea.   What are alendronate and risedronate?  Alendronate (brand name: Fosamax) and risedronate (brand name: Actonel) are not hormones, but are used to help prevent and treat osteoporosis. These drugs help reduce the risk of fractures by decreasing the rate of bone loss. Their most common side effect is an upset stomach.  What is raloxifene?   Raloxifene (brand name: Evista) is a drug used to prevent and treat osteoporosis by increasing bone density. It is not a hormone, but it mimics some of the effects of estrogen. Side effects may include hot flashes and a risk of blood clots.  What is teriparatide?   Teriparatide (brand name: Forteo) is a new injectable synthetic hormone used once a day for the treatment of osteoporosis. It causes new bone growth. Common side effects may include nausea, dizziness and leg cramps.  How much calcium do I need?  Before menopause, you need about 1,000 mg of calcium per day. After menopause, you need 1,000 mg of calcium per day if you're taking estrogen and 1,500 mg of calcium per day if you're not taking estrogen.   It's usually best to try to get calcium from food. Nonfat and low-fat dairy products are good sources of calcium. Other sources of calcium include dried beans, sardines and broccoli.  About 300 mg of calcium are in each of the followin cup of milk or yogurt, 2 cups of broccoli, or 6 to 7 sardines.  If you don't get enough calcium from the food you eat, your doctor may suggest taking a calcium pill. Take it at meal time or with a sip of milk. Vitamin D and lactose (the natural sugar in milk) help your body absorb the calcium.    Tips to keep bones strong  Exercise.   Eat a well-balanced diet with at least 1,000 mg of calcium a day.   Quit smoking. Smoking makes osteoporosis worse.   Talk to your doctor about HRT or other medicines to  prevent or treat osteoporosis.         Reviewed/Updated: 04/05  Created: 1996  This handout provides a general overview on this topic and may not apply to everyone. To find out if this handout applies to you and to get more information on this subject, talk to your family doctor.   Copyright © 9613-0351 [employer]   Permission is granted to print and photocopy this material for nonprofit educational uses.   Written permission is required for all other uses, including electronic uses.  Home  Privacy Policy  Contact Us  About This Site  What's New                       Calcium in Your Diet   Why do I need calcium in my diet?   Calcium is the most abundant mineral in your body.  It is necessary for nerves to transmit messages to muscles.  In addition, it is needed for those muscles to be able to contract in response to the messages.  It is also needed for blood to clot.    Most importantly, however, calcium is stored in your bones and teeth and helps make them hard and strong.    How much calcium do I need in my diet?   The [place] National Institutes of Health Consensus Conference on Optimal Calcium Intake in 1994 recommended higher daily calcium intakes for older men and women than for younger adults.  The new recommendation for men between the ages of 51 and 65 is 1000 mg per day.  For men over the age of 65, a daily intake of 1500 mg is recommended.    For most women over the age of 50, the recommendation for daily calcium intake is 1500 mg.  For women taking estrogen, the recommendation is lowered to 1000 mg.    Which foods contain calcium?   Your body gets the calcium it needs from calcium-rich foods, primarily milk and dairy products.  Green leafy vegetables, broccoli, okra, fruit, eggs, fish, sardines with bones, and molasses are other good sources of calcium.    What happens if I don't get enough calcium?   If your body does not get enough calcium, you may experience muscle cramps  in your hands, feet, and face.    In addition, your bones may lose calcium and become thinner and weaker.  This condition is called osteoporosis, and it can result in:   · a gradual loss of height   · humping of the back   · bones that break easily   · serious fractures if you fall.    How can I take care of myself?   · If you are losing height or getting a hump in your back, see your doctor.  If you are diagnosed with osteoporosis, follow your doctor's treatment recommendations.    · Take calcium supplements if you are advised to do so.    · Eat more calcium-rich food: dairy products, green leafy vegetables, citrus fruit, and sardines.    · If you do not have an intolerance for dairy products, add cheese to salads and entrees and milk to casseroles and canned soups.  If you are trying to cut back on fat, use only nonfat milk and fat-free and reduced-fat cheese.    · Get plenty of exercise.  Walk a mile a day if you can.    Developed by Yolanda Wood M.D., for [employer]          Please do not hesitate to call or message with any questions or concerns    Grace Duron MD

## 2023-11-17 ENCOUNTER — DOCUMENT SCAN (OUTPATIENT)
Dept: HOME HEALTH SERVICES | Facility: HOSPITAL | Age: 73
End: 2023-11-17
Payer: MEDICARE

## 2023-11-28 ENCOUNTER — TELEPHONE (OUTPATIENT)
Dept: NEUROLOGY | Facility: CLINIC | Age: 73
End: 2023-11-28
Payer: MEDICARE

## 2023-11-28 ENCOUNTER — EXTERNAL HOME HEALTH (OUTPATIENT)
Dept: HOME HEALTH SERVICES | Facility: HOSPITAL | Age: 73
End: 2023-11-28
Payer: MEDICARE

## 2023-11-29 ENCOUNTER — LAB VISIT (OUTPATIENT)
Dept: LAB | Facility: HOSPITAL | Age: 73
End: 2023-11-29
Attending: NURSE PRACTITIONER
Payer: MEDICARE

## 2023-11-29 ENCOUNTER — TELEPHONE (OUTPATIENT)
Dept: NEUROLOGY | Facility: CLINIC | Age: 73
End: 2023-11-29
Payer: MEDICARE

## 2023-11-29 ENCOUNTER — OFFICE VISIT (OUTPATIENT)
Dept: NEUROLOGY | Facility: CLINIC | Age: 73
End: 2023-11-29
Payer: MEDICARE

## 2023-11-29 VITALS — DIASTOLIC BLOOD PRESSURE: 57 MMHG | HEART RATE: 59 BPM | SYSTOLIC BLOOD PRESSURE: 126 MMHG

## 2023-11-29 DIAGNOSIS — R42 DIZZINESS AND GIDDINESS: Primary | ICD-10-CM

## 2023-11-29 DIAGNOSIS — R41.3 MEMORY LOSS: ICD-10-CM

## 2023-11-29 DIAGNOSIS — R26.81 GAIT INSTABILITY: ICD-10-CM

## 2023-11-29 DIAGNOSIS — R41.3 OTHER AMNESIA: ICD-10-CM

## 2023-11-29 PROBLEM — I65.03 STENOSIS OF BOTH VERTEBRAL ARTERIES: Status: RESOLVED | Noted: 2023-08-11 | Resolved: 2023-11-29

## 2023-11-29 PROCEDURE — 84443 ASSAY THYROID STIM HORMONE: CPT | Performed by: NURSE PRACTITIONER

## 2023-11-29 PROCEDURE — 86592 SYPHILIS TEST NON-TREP QUAL: CPT | Performed by: NURSE PRACTITIONER

## 2023-11-29 PROCEDURE — 99215 PR OFFICE/OUTPT VISIT, EST, LEVL V, 40-54 MIN: ICD-10-PCS | Mod: S$PBB,,, | Performed by: NURSE PRACTITIONER

## 2023-11-29 PROCEDURE — 99215 OFFICE O/P EST HI 40 MIN: CPT | Mod: PBBFAC,PO | Performed by: NURSE PRACTITIONER

## 2023-11-29 PROCEDURE — 82607 VITAMIN B-12: CPT | Performed by: NURSE PRACTITIONER

## 2023-11-29 PROCEDURE — 82565 ASSAY OF CREATININE: CPT | Performed by: NURSE PRACTITIONER

## 2023-11-29 PROCEDURE — 36415 COLL VENOUS BLD VENIPUNCTURE: CPT | Mod: PO | Performed by: NURSE PRACTITIONER

## 2023-11-29 PROCEDURE — 82140 ASSAY OF AMMONIA: CPT | Performed by: NURSE PRACTITIONER

## 2023-11-29 PROCEDURE — 83921 ORGANIC ACID SINGLE QUANT: CPT | Performed by: NURSE PRACTITIONER

## 2023-11-29 PROCEDURE — 99999 PR PBB SHADOW E&M-EST. PATIENT-LVL V: ICD-10-PCS | Mod: PBBFAC,,, | Performed by: NURSE PRACTITIONER

## 2023-11-29 PROCEDURE — 99999 PR PBB SHADOW E&M-EST. PATIENT-LVL V: CPT | Mod: PBBFAC,,, | Performed by: NURSE PRACTITIONER

## 2023-11-29 PROCEDURE — 82746 ASSAY OF FOLIC ACID SERUM: CPT | Performed by: NURSE PRACTITIONER

## 2023-11-29 PROCEDURE — 84425 ASSAY OF VITAMIN B-1: CPT | Performed by: NURSE PRACTITIONER

## 2023-11-29 PROCEDURE — 99215 OFFICE O/P EST HI 40 MIN: CPT | Mod: S$PBB,,, | Performed by: NURSE PRACTITIONER

## 2023-11-29 NOTE — ASSESSMENT & PLAN NOTE
Patient is a 72 y/o female that presents with dizziness and imbalance. This has been ongoing for the last  year or so    - dizziness described as feeling weak throughout with associated lightheadedness and near syncope.    - dizziness occurs when changing positions from sitting to standing.   There is no associated focal weakness, visual/speech disturbance, LOC  Neuro exam with intact strength and no sensory deficit. Gait is cautious at times. Non-magnetic   Orthostatics Bps with drop noted from laying to sitting  Suspect her imbalance to be multifactorial   - medication S/E   - cardiac component   - neuropathy    - spine disease   - intracranial stenosis   Recommend she drink plenty of fluids and f/u with cards  Obtain MRI brain scan and labs  Agree with alternative to Elavil as this med can contribute to memory decline and falls   - consider trazodone for sleep and/or Nortriptyline for neuropathy control. Pt to discuss with PCP

## 2023-11-29 NOTE — PROGRESS NOTES
NEUROLOGY  Outpatient Consultation Visit     [facility]  [address] [facility] [address], [place] [address]  [phone] (office) / [phone] (fax)    Patient Name:  Paola Wood  :  1950  MR #:  [id number]  Acct #:  [id number]    Date of Neurology Consult: 2023  Name of Provider: JESENIA Gasca    Other Physicians:  Grace Duron MD (Primary Care Physician); Grace Duron MD (Referring)      Chief Complaint: No chief complaint on file.      History of Present Illness (HPI):  Paola Wood is a right handed  73 y.o. female with a PMHX of HTN, Asthma, CAD, CHF, chronic pain, CABG      Patient presents today for dizziness. This has been going on for over a year.  This is mainly noticed when changing positions from a sitting to standing position. At times she may stand and feel fine but after standing for a while she may feel dizzy and fall. She describes dizziness as weakness and lightheaded. She feels as though she could pass out but has never lost consciousness. She will have to quickly sit down if able. Her last fall was suspected to be 3 weeks ago. She did hit her head. At one time she fell and fractured her right fibula.  She endorses chronic back pain and actively follows pain management. She states she only take pain medications when needed. She also had a cervical fusion in the past.     She admits to not drinking enough water. She reports poor memory and word finding difficulty.   She did have difficulty with reporting medical history today when asked.       She takes Topamax for unknown reasons. When asked about seizures she states she may have had 3 seizures in  but cannot provide history on this. She also takes Amitriptyline nightly for sleep and neuropathy. She believes she is taking too much BP medication and states she has expressed this to her cardiologist.         Past Medical, Surgical, Family & Social History:   Past Medical History:    Diagnosis Date    Allergy     AR (allergic rhinitis)     Asthma     CAD (coronary artery disease)     CHF (congestive heart failure)     Chronic pain     neck/back/arm/knee    Hypertension     Seizures      Past Surgical History:   Procedure Laterality Date    CATARACT EXTRACTION      HYSTERECTOMY  1983    partial (Ovaries removed 1998)    OOPHORECTOMY  1998     Family History   Problem Relation Age of Onset    Heart disease Mother     Heart disease Father     No Known Problems Maternal Grandmother     No Known Problems Maternal Grandfather     No Known Problems Paternal Grandmother     No Known Problems Paternal Grandfather     Breast cancer Sister 53     Alcohol use:  reports no history of alcohol use.   (Of note, 0.6 oz = 1 beer or 6 oz = 10 beers).  Tobacco use:  reports that she has never smoked. She has never used smokeless tobacco.  Street drug use:  reports no history of drug use.  Allergies: Ace inhibitors, Bacitracin, Ezetimibe, Lisinopril, Neomycin, Niacin, Polymyxin b, Atorvastatin, Rosuvastatin, and Statins-hmg-coa reductase inhibitors.    Home Medications:     Current Outpatient Medications:     alirocumab (PRALUENT PEN) 75 mg/mL PnIj, Inject 1 mL (75 mg total) into the skin every 14 (fourteen) days., Disp: 1 mL, Rfl: 4    amitriptyline (ELAVIL) 150 MG Tab, Take 150 mg by mouth every evening., Disp: , Rfl:     aspirin (ECOTRIN) 81 MG EC tablet, Take 81 mg by mouth once daily., Disp: , Rfl:     chlorthalidone (HYGROTEN) 25 MG Tab, Take 25 mg by mouth every morning., Disp: , Rfl:     cloNIDine (CATAPRES) 0.2 MG tablet, Take 1 tablet (0.2 mg total) by mouth as needed., Disp: 30 tablet, Rfl: 0    clopidogrel (PLAVIX) 75 mg tablet, Take 75 mg by mouth once daily., Disp: , Rfl: 5    cyproheptadine (PERIACTIN) 4 mg tablet, Take 4 mg by mouth 3 (three) times daily as needed., Disp: , Rfl: 5    folic acid (FOLVITE) 1 MG tablet, Take 1 tablet (1 mg total) by mouth once daily. FOLIC ACID 1 MG TABS, Disp: 90  tablet, Rfl: 1    furosemide (LASIX) 20 MG tablet, Take 20 mg by mouth., Disp: , Rfl:     gemfibroziL (LOPID) 600 MG tablet, Take 600 mg by mouth 2 (two) times daily., Disp: , Rfl:     hydrALAZINE (APRESOLINE) 50 MG tablet, Take 50 mg by mouth 3 (three) times daily., Disp: , Rfl:     isosorbide mononitrate (IMDUR) 30 MG 24 hr tablet, Take 30 mg by mouth every morning., Disp: , Rfl:     metoprolol succinate (TOPROL XL) 50 MG 24 hr tablet, 1 and 1/2 in the AM.  1 and 1/2 at night., Disp: 270 tablet, Rfl: 1    NITROSTAT 0.4 mg SL tablet, , Disp: , Rfl: 2    oxyCODONE-acetaminophen (PERCOCET)  mg per tablet, Take 1 tablet by mouth every 4 (four) hours as needed., Disp: , Rfl:     pantoprazole (PROTONIX) 40 MG tablet, Take 1 tablet (40 mg total) by mouth once daily. PROTONIX 40 MG TBEC, Disp: 90 tablet, Rfl: 1    potassium chloride SA (K-DUR,KLOR-CON) 20 MEQ tablet, Take 20 mEq by mouth once daily., Disp: , Rfl:     ranolazine (RANEXA) 500 MG Tb12, Take 500 mg by mouth 2 (two) times daily., Disp: , Rfl:     topiramate (TOPAMAX) 15 MG capsule, Take by mouth., Disp: , Rfl:     Physical Examination:  BP (!) 126/57 (BP Location: Left arm, Patient Position: Standing, BP Method: Medium (Automatic))   Pulse (!) 59     GENERAL:  General appearance: Well, non-toxic appearing.  No apparent distress.  Neck: supple.  .    MENTAL STATUS:  Alertness, attention span & concentration: normal.  Language: normal.  Orientation to self, place & time:  normal.  Memory, recent & remote: normal.  Fund of knowledge: normal.      SPEECH:  Clear and fluent.  Follows complex commands.      CRANIAL NERVES:  Cranial Nerves II-XII were examined.  II - Visual fields: normal.  III, IV, VI: PERRL, EOMI, No ptosis, No nystagmus.  V - Facial sensation: normal.  VII - Face symmetry & mobility: normal.  VIII - Hearing: normal  IX, X - Palate: mobile & midline.  XI - Shoulder shrug: normal.  XII - Tongue protrusion: normal.        GROSS MOTOR:  Gait &  station: able to rise from chair without difficulty; good stride,step height and arm swing; 1 step turn  Tone: normal.  Abnormal movements: none.  Finger-nose: normal.  Rapid alternating movements: normal.  Pronator drift: normal      MUSCLE STRENGTH:     RIGHT    LEFT   5 Neck Ext. 5   5 Neck Flex 5   5 Deltoids 5   5 Biceps 5   5 Triceps 5   5 Forearm.Pr. 5        5 Iliopsoas flex    5   5 Hip Abduct 5   5 Hip Adduct 5   5 Quads 5   5 Hams 5   5 Dorsiflex 5   5 Plantar Flex 5     REFLEXES:    RIGHT Reflex   LEFT   2+ Biceps 2+   2+ Brachiorad. 2+        2+ Patellar 2+         SENSORY:  Light touch: Normal throughout.  Sharp touch: Normal throughout  Vibration: Normal throughout.   Joint Position: Normal throughout.  Proprioception: Intact      Diagnostic Data Reviewed:   I have personally reviewed provider notes, labs and imaging made available to me today.     Imaging:  CT Head Without Contrast 9/2023    Narrative  CT HEAD WITHOUT CONTRAST:    CPT 40227    Total DLP: 461 mGy-cm  Automatic exposure control was utilized to limit the radiation dose to the patient.    History: Altered mental status.      Comparison: None.      Technique: Multiple contiguous axial images were acquired from the base of the skull the vertex without contrast administration.    Findings:  There are mild diffuse cerebral and cerebellar parenchymal involutional changes for the patient's age.  The ventricles and basal cisterns are unremarkable.  There is scattered asymmetric low density without obvious mass-effect throughout the bilateral supratentorial white matter. The gray-white junctions are maintained. No other cerebral or cerebellar parenchymal abnormality is identified. There is no hemorrhage, midline shift, significant mass-effect, or extra-axial fluid collection. There are calcifications of the distal internal carotid and vertebrobasilar arteries. The partially visualized paranasal sinuses and mastoid air cells are clear. The  calvarium is intact.    Impression  Impression:  1. No acute intracranial process is identified.  2. Scattered white matter microvascular ischemic changes.  3. Diffuse involutional changes.    CTH 10/2023:  Findings:   There is mild brain atrophy. There is no evidence of hemorrhage, acute infarction, mass lesion, or hydrocephalus.     The orbits, mastoid air cells, and paranasal sinuses are unremarkable.     Impression:   No acute finding           CT C-spine 10/2023:  Findings:   Cervical vertebral body heights and alignment are maintained. There are ACDF changes at C5-6 with ankylosis. Craniocervical junction is unremarkable.     There are moderate discogenic degenerative changes as well as moderate facet and uncovertebral DJD.     Lung apices are unremarkable.     Impression:   Cervical spine DJD and post surgical change without clear acute fracture.        CTA 3/2023:  HEAD FINDINGS: Stenoses are present within the cavernous portion of the right internal carotid artery and supraclinoid portion of the left internal carotid artery. The A1 segment of the left anterior cerebral artery is absent. Moderate stenosis is   present within the M1 segment of the right middle cerebral artery. The left middle cerebral artery, anterior cerebral arteries and anterior communicating artery are patent.  The right vertebral artery is hypoplastic distal to the origin of PICA. Moderate   stenosis of the proximal intracranial portion of the left vertebral artery is present. Basilar artery and posterior cerebral arteries are patent.  There is no evidence of aneurysm.     NECK FINDINGS:  Atheromatous changes are present within the extracranial portions of the common carotid and internal carotid arteries without significant stenosis. The extracranial portions of the vertebral arteries are patent without significant   stenosis. The subclavian arteries are patent without significant stenosis. Anterior cervical fusion hardware is present.  "Sternotomy wires are also visualized.  Evaluation of the internal carotid arteries for determining clinically significant stenosis   was performed by comparing the diameters of the proximal and distal internal carotid arteries.     IMPRESSION:    1.  No evidence of large vessel occlusion.    2.  Stenoses of the intracranial portions of the internal carotid arteries, left vertebral artery, and M1 segment of the right middle cerebral artery, as described.       Cardiac:  CUS 8/2023:  IMPRESSION: No hemodynamically significant stenosis, less than 20% bilaterally.   Labs:  Lab Results   Component Value Date    WBC 7.43 09/15/2023    HGB 10.1 (L) 09/15/2023    HCT 30.2 (L) 09/15/2023     09/15/2023    MCV 94 09/15/2023    RDW 13.5 09/15/2023     Lab Results   Component Value Date     09/15/2023    K 3.5 09/15/2023     09/15/2023    CO2 26 09/15/2023    BUN 27 (H) 09/15/2023    CREATININE 1.25 09/15/2023     09/15/2023    CALCIUM 9.4 09/15/2023    MG 1.9 09/18/2022     Lab Results   Component Value Date    PROT 8.1 09/15/2023    ALBUMIN 4.4 09/15/2023    BILITOT 0.3 09/15/2023    AST 31 09/15/2023    ALKPHOS 103 09/15/2023    ALT 23 09/15/2023     Lab Results   Component Value Date    INR 1.1 09/15/2023     Lab Results   Component Value Date    CHOL 381 (H) 08/27/2023    HDL 53 08/27/2023    LDLCALC 290 (H) 08/27/2023    TRIG 191 08/27/2023    CHOLHDL 7.2 (H) 08/27/2023     Lab Results   Component Value Date    HGBA1C 5.7 01/01/2023      No results found for: "PUPXWVZK70"  No results found for: "FOLATE"  No results found for: "TSH"            Assessment and Plan:  Paola Wood is a 73 y.o. female.      Problem List Items Addressed This Visit          Neuro    Memory loss    Current Assessment & Plan     Pt reports worsening memory decline. She reports word finding difficulty and poor STM.   She was unable to provide significant medical history today  Obtain brain imaging and " serologies  Perform formal memory screen at next appt    - advised to have her daughter accompany her at that appt            Other    Dizziness and giddiness - Primary    Current Assessment & Plan     Patient is a 74 y/o female that presents with dizziness and imbalance. This has been ongoing for the last  year or so    - dizziness described as feeling weak throughout with associated lightheadedness and near syncope.    - dizziness occurs when changing positions from sitting to standing.   There is no associated focal weakness, visual/speech disturbance, LOC  Neuro exam with intact strength and no sensory deficit. Gait is cautious at times. Non-magnetic   Orthostatics Bps with drop noted from laying to sitting  Suspect her imbalance to be multifactorial   - medication S/E   - cardiac component   - neuropathy    - spine disease   - intracranial stenosis   Recommend she drink plenty of fluids and f/u with cards  Obtain MRI brain scan and labs  Agree with alternative to Elavil as this med can contribute to memory decline and falls   - consider trazodone for sleep and/or Nortriptyline for neuropathy control. Pt to discuss with PCP            Other Visit Diagnoses       Gait instability        Other amnesia                        Important to note, also  has a past medical history of Allergy, AR (allergic rhinitis), Asthma, CAD (coronary artery disease), CHF (congestive heart failure), Chronic pain, Hypertension, and Seizures.            The patient will return to clinic in 1-2 months for memory eval        All questions were answered and patient is comfortable with the plan.       Thank you very much for the opportunity to assist in this patient's care.    If you have any questions or concerns, please do not hesitate to contact me at any time.    Sincerely,     JESENIA Gasca  [facility] [place]         I spent a total of 60 minutes on the day of the visit.This includes face to face time  and non-face to face time preparing to see the patient (eg, review of tests), Obtaining and/or reviewing separately obtained history, Documenting clinical information in the electronic or other health record, Independently interpreting resultsand communicating results to the patient/family/caregiver, or Care coordination.

## 2023-11-29 NOTE — ASSESSMENT & PLAN NOTE
Pt reports worsening memory decline. She reports word finding difficulty and poor STM.   She was unable to provide significant medical history today  Obtain brain imaging and serologies  Perform formal memory screen at next appt    - advised to have her daughter accompany her at that appt

## 2023-11-29 NOTE — TELEPHONE ENCOUNTER
----- Message from Charlie Jennings, Patient Care Assistant sent at 11/29/2023  3:33 PM CST -----  Contact: Pt  Type: Needs Medical Advice    Who Called: Pt  Best Call Back Number: [phone]  Inquiry/Question: Pt running about 10 minutes late to appt due to taking wrong exit Thank you~

## 2023-11-30 LAB
AMMONIA PLAS-SCNC: 32 UMOL/L (ref 10–50)
CREAT SERPL-MCNC: 1.9 MG/DL (ref 0.5–1.4)
EST. GFR  (NO RACE VARIABLE): 27.7 ML/MIN/1.73 M^2
FOLATE SERPL-MCNC: >40 NG/ML (ref 4–24)
RPR SER QL: NORMAL
TSH SERPL DL<=0.005 MIU/L-ACNC: 2.04 UIU/ML (ref 0.4–4)
VIT B12 SERPL-MCNC: 430 PG/ML (ref 210–950)

## 2023-12-05 LAB
METHYLMALONATE SERPL-SCNC: 0.28 UMOL/L
VIT B1 BLD-MCNC: 48 UG/L (ref 38–122)

## 2023-12-07 ENCOUNTER — DOCUMENT SCAN (OUTPATIENT)
Dept: HOME HEALTH SERVICES | Facility: HOSPITAL | Age: 73
End: 2023-12-07
Payer: MEDICARE

## 2023-12-12 ENCOUNTER — DOCUMENT SCAN (OUTPATIENT)
Dept: HOME HEALTH SERVICES | Facility: HOSPITAL | Age: 73
End: 2023-12-12
Payer: MEDICARE

## 2024-01-04 DIAGNOSIS — R42 DIZZINESS AND GIDDINESS: Primary | ICD-10-CM

## 2024-01-05 ENCOUNTER — HOSPITAL ENCOUNTER (OUTPATIENT)
Dept: RADIOLOGY | Facility: HOSPITAL | Age: 74
Discharge: HOME | End: 2024-01-05
Attending: NURSE PRACTITIONER
Payer: MEDICARE

## 2024-01-05 DIAGNOSIS — R42 DIZZINESS AND GIDDINESS: ICD-10-CM

## 2024-01-05 DIAGNOSIS — R41.3 OTHER AMNESIA: ICD-10-CM

## 2024-01-05 PROCEDURE — 70553 MRI BRAIN STEM W/O & W/DYE: CPT | Mod: 26,,, | Performed by: RADIOLOGY

## 2024-01-05 PROCEDURE — 25500020 PHARM REV CODE 255: Mod: PO | Performed by: NURSE PRACTITIONER

## 2024-01-05 PROCEDURE — 70553 MRI BRAIN STEM W/O & W/DYE: CPT | Mod: TC,PO

## 2024-01-05 PROCEDURE — A9585 GADOBUTROL INJECTION: HCPCS | Mod: PO | Performed by: NURSE PRACTITIONER

## 2024-01-05 RX ORDER — GADOBUTROL 604.72 MG/ML
6 INJECTION INTRAVENOUS
Status: COMPLETED | OUTPATIENT
Start: 2024-01-05 | End: 2024-01-05

## 2024-01-05 RX ADMIN — GADOBUTROL 6 ML: 604.72 INJECTION INTRAVENOUS at 04:01

## 2024-02-06 DIAGNOSIS — I10 ESSENTIAL (PRIMARY) HYPERTENSION: ICD-10-CM

## 2024-02-06 DIAGNOSIS — I20.9 ANGINA PECTORIS, UNSPECIFIED: ICD-10-CM

## 2024-02-07 RX ORDER — NITROGLYCERIN 0.4 MG/1
TABLET SUBLINGUAL
Qty: 25 TABLET | Refills: 0 | Status: SHIPPED | OUTPATIENT
Start: 2024-02-07 | End: 2024-05-22 | Stop reason: SDUPTHER

## 2024-02-07 RX ORDER — AMLODIPINE BESYLATE 5 MG/1
5 TABLET ORAL
Qty: 30 TABLET | Refills: 0 | Status: SHIPPED | OUTPATIENT
Start: 2024-02-07 | End: 2024-03-06

## 2024-02-07 RX ORDER — LOSARTAN POTASSIUM 50 MG/1
50 TABLET ORAL
Qty: 30 TABLET | Refills: 0 | Status: SHIPPED | OUTPATIENT
Start: 2024-02-07 | End: 2024-03-06

## 2024-02-07 RX ORDER — GEMFIBROZIL 600 MG/1
TABLET, FILM COATED ORAL
Qty: 60 TABLET | Refills: 0 | Status: SHIPPED | OUTPATIENT
Start: 2024-02-07 | End: 2024-05-22 | Stop reason: SDUPTHER

## 2024-02-07 NOTE — TELEPHONE ENCOUNTER
Refill Routing Note   Medication(s) are not appropriate for processing by [facility] for the following reason(s):        No active prescription written by provider    [facility] action(s):  Defer               Appointments  past 12m or future 3m with PCP    Date Provider   Last Visit   10/25/2023 Grace Duron MD   Next Visit   Visit date not found Grace Duron MD   ED visits in past 90 days: 0        Note composed:9:25 PM 02/06/2024

## 2024-02-14 ENCOUNTER — TELEPHONE (OUTPATIENT)
Dept: FAMILY MEDICINE | Facility: CLINIC | Age: 74
End: 2024-02-14
Payer: MEDICARE

## 2024-02-14 NOTE — TELEPHONE ENCOUNTER
----- Message from Melissa Hurtado sent at 2/14/2024 12:08 PM CST -----  Contact: [phone]  Type:  Sooner Apoointment Request    Caller is requesting a sooner appointment.  Caller declined first available appointment listed below.  Caller will not accept being placed on the waitlist and is requesting a message be sent to doctor.  Name of Caller:Paola   When is the first available appointment?4/2024  Symptoms: follow up   Would the patient rather a call back or a response via [facility]? Call back   Best Call Back Number:[phone]   Additional Information: n/a      Thanks KB

## 2024-02-14 NOTE — TELEPHONE ENCOUNTER
I spoke with the patient about this. Pt requesting an appointment with Grace Duron MD to discuss medication. Appointment scheduled.

## 2024-03-04 DIAGNOSIS — I10 ESSENTIAL (PRIMARY) HYPERTENSION: ICD-10-CM

## 2024-03-06 RX ORDER — LOSARTAN POTASSIUM 50 MG/1
50 TABLET ORAL
Qty: 90 TABLET | Refills: 1 | Status: ON HOLD | OUTPATIENT
Start: 2024-03-06 | End: 2024-05-14 | Stop reason: HOSPADM

## 2024-03-06 RX ORDER — AMLODIPINE BESYLATE 5 MG/1
5 TABLET ORAL
Qty: 90 TABLET | Refills: 2 | Status: ON HOLD | OUTPATIENT
Start: 2024-03-06 | End: 2024-05-14 | Stop reason: HOSPADM

## 2024-03-18 NOTE — PROGRESS NOTES
NEUROLOGY  Outpatient Follow Up    [facility]  [address] [facility], [place] [address]  [phone] (office) / [phone] (fax)    Patient Name:  Paola Wood  :  1950  MR #:  [id number]  Acct #:  [id number]    Date of Neurology Visit: 2024  Name of Provider: JESENIA Gasca    Other Physicians:  Grace Duron MD (Primary Care Physician); No ref. provider found (Referring)      Chief Complaint: Memory Loss      History of Present Illness (HPI):  Paola Wood is a right handed  73 y.o. female with a PMHX of HTN, Asthma, CAD, CHF, chronic pain, CABG        Patient presents today for dizziness. This has been going on for over a year.  This is mainly noticed when changing positions from a sitting to standing position. At times she may stand and feel fine but after standing for a while she may feel dizzy and fall. She describes dizziness as weakness and lightheaded. She feels as though she could pass out but has never lost consciousness. She will have to quickly sit down if able. Her last fall was suspected to be 3 weeks ago. She did hit her head. At one time she fell and fractured her right fibula.  She endorses chronic back pain and actively follows pain management. She states she only take pain medications when needed. She also had a cervical fusion in the past.      She admits to not drinking enough water. She reports poor memory and word finding difficulty.   She did have difficulty with reporting medical history today when asked.         She takes Topamax for unknown reasons. When asked about seizures she states she may have had 3 seizures in  but cannot provide history on this. She also takes Amitriptyline nightly for sleep and neuropathy. She believes she is taking too much BP medication and states she has expressed this to her cardiologist.           Interval Hx 3/19/2024:  Patient presents today for memory loss. She is solo today but was able to call  her daughter via phone.     She reports word finding difficulty and poor short term recall. She lives at home with her daughter.     Patient's highest level of education completed was plus 30 and worked in education. The onset of memory issues likely began about 2 years ago. Patient believes she struggles with both short and long term memory loss but short term is worse. There are no significant behavioral changes. Daughter states she gets frustrated/agitated in regard to her memory changes. She denies depression. She denies hallucinations.   She states she sleeps well at night. She may get up to use the restroom at times and able to fall back asleep. Daughter states she does snore. She takes Amitriptyline nightly for sleep. Patient is managing her finances and denies issues. Patient is also managing her medications. Daughter is aware of her medications as well. She is independent with ADLs. She admits to word finding difficulty. She denies urinary incontinence.   Her last fall was about 1 month ago. She states she does fall often. She is not driving as of 2 years ago due to a mechanical issue. There are no reports of alcoholism/drug use. No family history of AD/dementia   She was placed on Topamax last year for migraine prevention. Daughter would like her to come off this medication.               Past Medical, Surgical, Family & Social History:   Reviewed and updated.    Home Medications:     Current Outpatient Medications:     alirocumab (PRALUENT PEN) 75 mg/mL PnIj, Inject 1 mL (75 mg total) into the skin every 14 (fourteen) days., Disp: 1 mL, Rfl: 4    amitriptyline (ELAVIL) 150 MG Tab, Take 150 mg by mouth every evening., Disp: , Rfl:     amLODIPine (NORVASC) 5 MG tablet, TAKE 1 TABLET BY MOUTH ONCE DAILY, Disp: 90 tablet, Rfl: 2    aspirin (ECOTRIN) 81 MG EC tablet, Take 81 mg by mouth once daily., Disp: , Rfl:     chlorthalidone (HYGROTEN) 25 MG Tab, Take 25 mg by mouth every morning., Disp: , Rfl:      "cloNIDine (CATAPRES) 0.2 MG tablet, Take 1 tablet (0.2 mg total) by mouth as needed., Disp: 30 tablet, Rfl: 0    clopidogrel (PLAVIX) 75 mg tablet, Take 75 mg by mouth once daily., Disp: , Rfl: 5    cyproheptadine (PERIACTIN) 4 mg tablet, Take 4 mg by mouth 3 (three) times daily as needed., Disp: , Rfl: 5    folic acid (FOLVITE) 1 MG tablet, Take 1 tablet (1 mg total) by mouth once daily. FOLIC ACID 1 MG TABS, Disp: 90 tablet, Rfl: 1    furosemide (LASIX) 20 MG tablet, Take 20 mg by mouth., Disp: , Rfl:     gemfibroziL (LOPID) 600 MG tablet, Take 1 tablet (600 mg total) by mouth in the morning and 1 tablet (600 mg total) in the evening. Take before meals., Disp: 60 tablet, Rfl: 0    hydrALAZINE (APRESOLINE) 50 MG tablet, Take 50 mg by mouth 3 (three) times daily., Disp: , Rfl:     isosorbide mononitrate (IMDUR) 30 MG 24 hr tablet, Take 30 mg by mouth every morning., Disp: , Rfl:     losartan (COZAAR) 50 MG tablet, TAKE 1 TABLET BY MOUTH EVERY DAY, Disp: 90 tablet, Rfl: 1    metoprolol succinate (TOPROL XL) 50 MG 24 hr tablet, 1 and 1/2 in the AM.  1 and 1/2 at night., Disp: 270 tablet, Rfl: 1    nitroGLYCERIN (NITROSTAT) 0.4 MG SL tablet, USE AS DIRECTED, Disp: 25 tablet, Rfl: 0    oxyCODONE-acetaminophen (PERCOCET)  mg per tablet, Take 1 tablet by mouth every 4 (four) hours as needed., Disp: , Rfl:     pantoprazole (PROTONIX) 40 MG tablet, Take 1 tablet (40 mg total) by mouth once daily. PROTONIX 40 MG TBEC, Disp: 90 tablet, Rfl: 1    potassium chloride SA (K-DUR,KLOR-CON) 20 MEQ tablet, Take 20 mEq by mouth once daily., Disp: , Rfl:     ranolazine (RANEXA) 500 MG Tb12, Take 500 mg by mouth 2 (two) times daily., Disp: , Rfl:     Physical Examination:  /67 (BP Location: Left arm, Patient Position: Sitting, BP Method: Small (Automatic))   Pulse 62   Ht 5' 3" (1.6 m)   Wt 70.7 kg (155 lb 12.1 oz)   BMI 27.59 kg/m²               GENERAL:  General appearance: Well, non-toxic appearing.  No apparent " distress.  Neck: supple.  .     MENTAL STATUS:  Alertness, attention span & concentration: normal.  Language: normal.  Orientation to self, place & time:  normal.  Memory, recent & remote: normal.  Fund of knowledge: normal.  MMSE: 22/30     SPEECH:  Clear and fluent.  Follows complex commands.        CRANIAL NERVES:  Cranial Nerves II-XII were examined.  II - Visual fields: normal.  III, IV, VI: PERRL, EOMI, No ptosis, No nystagmus.  V - Facial sensation: normal.  VII - Face symmetry & mobility: normal.  VIII - Hearing: normal  IX, X - Palate: mobile & midline.  XI - Shoulder shrug: normal.  XII - Tongue protrusion: normal.           GROSS MOTOR:  Gait & station: able to rise from chair without difficulty; good stride,step height and arm swing; 1 step turn  Tone: normal.  Abnormal movements: none.  Finger-nose: normal.  Rapid alternating movements: normal.  Pronator drift: normal        MUSCLE STRENGTH:      RIGHT      LEFT   5 Neck Ext. 5   5 Neck Flex 5   5 Deltoids 5   5 Biceps 5   5 Triceps 5   5 Forearm.Pr. 5           4 Iliopsoas flex    4   5 Hip Abduct 5   5 Hip Adduct 5   5 Quads 5   5 Hams 5   5 Dorsiflex 5   5 Plantar Flex 5      REFLEXES:     RIGHT Reflex    LEFT   2+ Biceps 2+   2+ Brachiorad. 2+           2+ Patellar 2+            SENSORY:  Light touch: Normal throughout.  Sharp touch: Normal throughout  Vibration: Normal throughout.   Joint Position: Normal throughout.  Proprioception: Intact               Diagnostic Data Reviewed:   I have personally reviewed provider notes, labs and imaging made available to me today.     Imaging:  MRI brain [facility] 1/2024:    FINDINGS:  Intracranial compartment:     There is no acute or significant abnormality.  The brain is essentially normal for age.  Brain volume, ventricular size and position are normal.  There is a mild burden of periventricular and subcortical white matter FLAIR hyperintense signal.  These findings likely reflect sequelae of mild chronic small  vessel disease.  There is no hemorrhage or mass.  There are no regions of restricted diffusion to suggest acute infarction.  There is no abnormal extra-axial fluid collection.  There is no abnormal enhancement in the brain.  The basilar cisterns are open.  Flow voids indicating patency are present in the major vessels at the base of the brain.  The cerebellar tonsils are normal position.  Pituitary volume is decreased, not unexpected for age.  The patient has had bilateral lens replacement surgery.  The orbits are otherwise grossly normal.     Skull/extracranial contents:     Baseline marrow signal is normal.  The patient has had previous anterior cervical fusion which is incompletely included.  There is trace scattered mucosal thickening in the ethmoid air cells.  There is a right mastoid effusion with partial opacification of the inferior left mastoid air cells as well.     Impression:     1. There is no acute abnormality.  There is mild nonspecific white matter change.  There is no hemorrhage, mass, acute infarction or abnormal enhancement in the brain.  2. Right greater than left mastoid effusions are present.        CT Head Without Contrast 9/2023    Narrative  CT HEAD WITHOUT CONTRAST:    CPT 07551    Total DLP: 461 mGy-cm  Automatic exposure control was utilized to limit the radiation dose to the patient.    History: Altered mental status.      Comparison: None.      Technique: Multiple contiguous axial images were acquired from the base of the skull the vertex without contrast administration.    Findings:  There are mild diffuse cerebral and cerebellar parenchymal involutional changes for the patient's age.  The ventricles and basal cisterns are unremarkable.  There is scattered asymmetric low density without obvious mass-effect throughout the bilateral supratentorial white matter. The gray-white junctions are maintained. No other cerebral or cerebellar parenchymal abnormality is identified. There is no  "hemorrhage, midline shift, significant mass-effect, or extra-axial fluid collection. There are calcifications of the distal internal carotid and vertebrobasilar arteries. The partially visualized paranasal sinuses and mastoid air cells are clear. The calvarium is intact.    Impression  Impression:  1. No acute intracranial process is identified.  2. Scattered white matter microvascular ischemic changes.  3. Diffuse involutional changes.      Cardiac:    Labs:  Lab Results   Component Value Date    WBC 7.43 09/15/2023    HGB 10.1 (L) 09/15/2023    HCT 30.2 (L) 09/15/2023     09/15/2023    MCV 94 09/15/2023    RDW 13.5 09/15/2023     Lab Results   Component Value Date     09/15/2023    K 3.5 09/15/2023     09/15/2023    CO2 26 09/15/2023    BUN 27 (H) 09/15/2023    CREATININE 1.3 01/05/2024     09/15/2023    CALCIUM 9.4 09/15/2023    MG 1.9 09/18/2022     Lab Results   Component Value Date    PROT 8.1 09/15/2023    ALBUMIN 4.4 09/15/2023    BILITOT 0.3 09/15/2023    AST 31 09/15/2023    ALKPHOS 103 09/15/2023    ALT 23 09/15/2023     Lab Results   Component Value Date    INR 1.1 09/15/2023     Lab Results   Component Value Date    CHOL 381 (H) 08/27/2023    HDL 53 08/27/2023    LDLCALC 290 (H) 08/27/2023    TRIG 191 08/27/2023    CHOLHDL 7.2 (H) 08/27/2023     Lab Results   Component Value Date    HGBA1C 5.7 01/01/2023      Lab Results   Component Value Date    IOXDJKCD41 430 11/29/2023     Lab Results   Component Value Date    FOLATE >40.0 (H) 11/29/2023     Lab Results   Component Value Date    TSH 2.042 11/29/2023     Lab Results   Component Value Date    RPR Non-reactive 11/29/2023     No results found for: "VITAMINB1"                    Assessment and Plan:    Problem List Items Addressed This Visit          Neuro    Memory loss - Primary    Current Assessment & Plan     Reports of STM loss and word finding difficulty   - onset ~ 2 years ago  There are no reports of behavorial changes, " hallucinations, RBD, depression or urinary incontinence. She dopes reportedly snore. Her last fall was ~ 1 month ago.  MMSE today 22/30   - suspect mild/mod neurocognitive disorder; unclear etiology  MRI brain scan with mild white matter change  Serologies noted  Obtain home sleep study   - prev PSG years ago and reportedly normal?  Recommend she have a baseline audiology eval  Discussed role vs expectation of cognitive enhancing medications at length    - consider in future  Daughter does oversee meds  Agree with alternative to Elavil as this med can contribute to memory decline and falls   - consider trazodone for sleep and/or Nortriptyline for neuropathy control. Pt to discuss with PCP  Wean Topamax until discontinued   - if migraines are troublesome then consider [facility] clinic referral   Pt no longer driving             Other    Sleep apnea    Current Assessment & Plan     Obtain home sleep study                              Important to note, also  has a past medical history of Allergy, AR (allergic rhinitis), Asthma, CAD (coronary artery disease), CHF (congestive heart failure), Chronic pain, Hypertension, and Seizures.          The patient will return to clinic in 6 months.    All questions were answered and patient is comfortable with the plan.         Thank you very much for the opportunity to assist in this patient's care.    If you have any questions or concerns, please do not hesitate to contact me at any time.      Sincerely,     JESENIA Gasca  [facility] [place]

## 2024-03-19 ENCOUNTER — TELEPHONE (OUTPATIENT)
Dept: NEUROLOGY | Facility: CLINIC | Age: 74
End: 2024-03-19
Payer: MEDICARE

## 2024-03-19 ENCOUNTER — OFFICE VISIT (OUTPATIENT)
Dept: NEUROLOGY | Facility: CLINIC | Age: 74
End: 2024-03-19
Payer: MEDICARE

## 2024-03-19 VITALS
WEIGHT: 155.75 LBS | BODY MASS INDEX: 27.6 KG/M2 | HEART RATE: 62 BPM | DIASTOLIC BLOOD PRESSURE: 67 MMHG | SYSTOLIC BLOOD PRESSURE: 128 MMHG | HEIGHT: 63 IN

## 2024-03-19 DIAGNOSIS — G47.30 SLEEP APNEA, UNSPECIFIED TYPE: ICD-10-CM

## 2024-03-19 DIAGNOSIS — R41.3 MEMORY LOSS: Primary | ICD-10-CM

## 2024-03-19 PROCEDURE — 99999 PR PBB SHADOW E&M-EST. PATIENT-LVL V: CPT | Mod: PBBFAC,,, | Performed by: NURSE PRACTITIONER

## 2024-03-19 PROCEDURE — 99483 ASSMT & CARE PLN PT COG IMP: CPT | Mod: S$PBB,,, | Performed by: NURSE PRACTITIONER

## 2024-03-19 PROCEDURE — 99499 UNLISTED E&M SERVICE: CPT | Mod: S$PBB,,, | Performed by: NURSE PRACTITIONER

## 2024-03-19 PROCEDURE — 99215 OFFICE O/P EST HI 40 MIN: CPT | Mod: PBBFAC,PO | Performed by: NURSE PRACTITIONER

## 2024-03-19 NOTE — ASSESSMENT & PLAN NOTE
Reports of STM loss and word finding difficulty   - onset ~ 2 years ago  There are no reports of behavorial changes, hallucinations, RBD, depression or urinary incontinence. She dopes reportedly snore. Her last fall was ~ 1 month ago.  MMSE today 22/30   - suspect mild/mod neurocognitive disorder; unclear etiology  MRI brain scan with mild white matter change  Serologies noted  Obtain home sleep study   - prev PSG years ago and reportedly normal?  Recommend she have a baseline audiology eval  Discussed role vs expectation of cognitive enhancing medications at length    - consider in future  Daughter does oversee meds  Agree with alternative to Elavil as this med can contribute to memory decline and falls   - consider trazodone for sleep and/or Nortriptyline for neuropathy control. Pt to discuss with PCP  Wean Topamax until discontinued   - if migraines are troublesome then consider [facility] clinic referral   Pt no longer driving

## 2024-03-19 NOTE — PROGRESS NOTES
Caregiver name: Raudel  Relationship to the patient: [email or web address]  Does the patient have a living will? No  Does the patient have a designated healthcare POA? No  Does the patient have a designated general POA? No    Have educational materials/resources been provided? Yes      Activities of Daily Living    Bathing: Independent  Dressing: Independent  Grooming: Independent  Mouth Care: Independent  Toileting: Independent  Transferring Bed/Chair: Independent  Walking: Independent  Climbing: Cannot Do  Eating: Independent      Instrumental Activities of Daily Living    Shopping: Independent  Cooking: Independent  Managing Medications: Independent  Using the phone and looking up numbers: Independent  Doing Housework: Needs Help  Doing Laundry: Independent  Driving or using public transportation: Cannot Do  Managing finances: Independent    Functional Assessment Staging  2   Complains of forgetting location of objects. Subjective work difficulties.             3/19/2024     8:25 AM 8/9/2023     9:42 AM 9/9/2016    11:00 AM   Depression Patient Health Questionnaire   Over the last two weeks how often have you been bothered by little interest or pleasure in doing things Several days Not at all Not at all   Over the last two weeks how often have you been bothered by feeling down, depressed or hopeless Not at all Not at all Not at all   PHQ-2 Total Score 1 0 0

## 2024-03-19 NOTE — PATIENT INSTRUCTIONS
The MIND Diet: A Detailed Guide for Beginners     The MIND diet is designed to prevent dementia and loss of brain function as you age. It combines the Mediterranean diet and the DASH diet to create a dietary pattern that focuses specifically on brain health. This article is a detailed guide for beginners, with everything you need to know about the MIND diet and how to follow it.     What Is the MIND Diet?  MIND stands for the Mediterranean-DASH Intervention for Neurodegenerative Delay.     The MIND diet aims to reduce dementia and the decline in brain health that often occurs as people get older. It combines aspects of two very popular diets, the Mediterranean diet and the Dietary Approaches to Stop Hypertension (DASH) diet.     Many experts regard the Mediterranean and DASH diets as some of the healthiest. Research has shown they can lower blood pressure and reduce the risk of heart disease, diabetes and several other diseases. But researchers wanted to create a diet specifically to help improve brain function and prevent dementia. To do this, they combined foods from the Mediterranean and DASH diets that had been shown to benefit brain health.     For example, both the Mediterranean and DASH diets recommend eating a lot of fruit. Fruit intake has not been correlated with improved brain function, but eating berries has been. Thus, the MIND diet encourages its followers to eat berries, but does not emphasize consuming fruit in general.      Currently, there are no set guidelines for how to follow the MIND diet. Simply eat more of the 10 foods the diet encourages you to eat, and eat less of the five foods the diet recommends you limit.     10 Foods to Eat on the MIND Diet     Green, leafy vegetables: Aim for six or more servings per week. This includes kale, spinach, cooked greens and salads.  All other vegetables: Try to eat another vegetable in addition to the green leafy vegetables at least once a day. It is best  to choose non-starchy vegetables because they have a lot of nutrients with a low number of calories.  Berries: Eat berries at least twice a week. Although the published research only includes strawberries, you should also consume other berries like blueberries, raspberries and blackberries for their antioxidant benefits.  Nuts: Try to get five servings of nuts or more each week. The creators of the MIND diet dont specify what kind of nuts to consume, but it is probably best to vary the type of nuts you eat to obtain a variety of nutrients.  Olive oil: Use olive oil as your main cooking oil. Check out this article for information about the safety of cooking with olive oil.  Whole grains: Aim for at least three servings daily. Choose whole grains like oatmeal, quinoa, brown rice, whole-wheat pasta and 100% whole-wheat bread.  Fish: Eat fish at least once a week. It is best to choose fatty fish like salmon, sardines, trout, tuna and mackerel for their high amounts of omega-3 fatty acids.  Beans: Include beans in at least four meals every week. This includes all beans, lentils and soybeans.  Poultry: Try to eat chicken or turkey at least twice a week. Note that fried chicken is not encouraged on the MIND diet.  Wine: Aim for no more than one glass daily. Both red and white wine may benefit the brain. However, much research has focused on the red wine compound resveratrol, which may help protect against Alzheimers disease.     If you are unable to consume the targeted amount of servings, dont quit the MIND diet altogether. Research has shown that following the MIND diet even a moderate amount is associated with a reduced risk of Alzheimers disease. When youre following the diet, you can eat more than just these 10 foods. However, the more you stick to the diet, the better your results may be.     5 Foods to Avoid on the MIND Diet     Butter and margarine: Try to eat less than 1 tablespoon (about 14 grams) daily.  Instead, try using olive oil as your primary cooking fat, and dipping your bread in olive oil with herbs.  Cheese: The MIND diet recommends limiting your cheese consumption to less than once per week.  Red meat: Aim for no more than three servings each week. This includes all beef, pork, lamb and products made from these meats.  Fried food: The MIND diet highly discourages fried food, especially the kind from fast-food restaurants. Limit your consumption to less than once per week.  Pastries and sweets: This includes most of the processed junk food and desserts you can think of. Ice cream, cookies, brownies, snack cakes, donuts, candy and more. Try to limit these to no more than four times a week.     Researchers encourage limiting your consumption of these foods because they contain saturated fats and trans fats. Studies have found that trans fats are clearly associated with all sorts of diseases, including heart disease and even Alzheimers disease. However, the health effects of saturated fat are widely debated in the nutrition world. Although the research on saturated fats and heart disease may be inconclusive and highly contested, animal research and observational studies in humans do suggest that consuming saturated fats in excess is associated with poor brain health.        The MIND Diet May Decrease Oxidative Stress and Inflammation  The current research on the MIND diet has not been able to show exactly how it works. However, the scientists who created the diet think it may work by reducing oxidative stress and inflammation. Oxidative stress occurs when unstable molecules called free radicals accumulate in the body in large quantities. This often causes damage to cells. The brain is especially vulnerable to this type of damage. Inflammation is your bodys natural response to injury and infection. But if its not properly regulated, inflammation can also be harmful and contribute to many chronic diseases.      Together, oxidative stress and inflammation can be quite detrimental to the brain. In recent years, theyve been the focus of some interventions to prevent and treat Alzheimers disease. Following the Mediterranean and DASH diets has been associated with lower levels of oxidative stress and inflammation. Because the MIND diet is a hybrid of these two diets, the foods that make up the MIND diet probably also have antioxidant and anti-inflammatory effects. The antioxidants in berries and the vitamin E in olive oil, green leafy vegetables and nuts are thought to benefit brain function by protecting the brain from oxidative stress. Additionally, the omega-3 fatty acids found in fatty fish are well-known for their ability to lower inflammation in the brain, and have been associated with slower loss of brain function.     The MIND Diet May Reduce Harmful Beta-Amyloid Proteins  Beta-amyloid proteins are protein fragments found naturally in the body. However, they can accumulate and form plaques that build up in the brain, disrupting communication between brain cells and eventually leading to brain cell death. In fact, many scientists believe these plaques are one of the primary causes of Alzheimers disease. Animal and test-tube studies suggest that the antioxidants and vitamins that many MIND diet foods contain may help prevent the formation of beta-amyloid plaques in the brain.     Additionally, the MIND diet limits foods that contain saturated fats and trans fats, which studies have shown can increase beta-amyloid protein levels in mices brains. Human observational studies have found that consuming these fats was associated with a doubled risk of Alzheimers disease. However, it is important to note that this type of research is not able to determine cause and effect. Higher-quality, controlled studies are needed to discover exactly how the MIND diet may benefit brain health.     A Sample Meal Plan for One  Week  Making meals for the MIND diet doesnt have to be complicated. Center your meals around the 10 foods and food groups that are encouraged on the diet, and try to stay away from the five foods that need to be limited.     Monday  Breakfast: Greek yogurt with raspberries, topped with sliced almonds.  Lunch: Mediterranean salad with olive-oil-based dressing, grilled chicken, whole-wheat nehal.  Dinner: Burrito bowl with brown rice, black beans, fajita vegetables, grilled chicken, salsa and guacamole.     Tuesday  Breakfast: Wheat toast with almond butter, scrambled eggs.  Lunch: Grilled chicken sandwich, blackberries, carrots.  Dinner: Grilled salmon, side salad with olive-oil-based dressing, brown rice.     Wednesday  Breakfast: Steel-cut oatmeal with strawberries, hard-boiled eggs.  Lunch: Mexican-style salad with mixed greens, black beans, red onion, corn, grilled chicken and olive-oil-based dressing.  Dinner: Chicken and vegetable stir-lugo, brown rice.     Thursday  Breakfast: Greek yogurt with peanut butter and banana.  Lunch: Baked trout, niyah greens, black-eyed peas.  Dinner: Whole-wheat spaghetti with turkey meatballs and marinara sauce, side salad with olive-oil-based dressing.     Friday  Breakfast: Wheat toast with avocado, omelet with peppers and onions.  Lunch: Chili made with ground turkey.  Dinner: Greek-seasoned baked chicken, oven-roasted potatoes, side salad, wheat dinner roll.     Saturday  Breakfast: Overnight oats with strawberries.  Lunch: Fish tacos on whole wheat tortillas, brown rice, palacios beans.  Dinner: Chicken gyro on whole-wheat nehal, cucumber and tomato salad.     Sunday  Breakfast: Spinach frittata, sliced apple and peanut butter.  Lunch: Tuna salad sandwich on wheat bread, plus carrots and celery with hummus.  Dinner: [place] chicken, brown rice, lentils.  You can drink a glass of wine with each dinner to satisfy the MIND diet recommendations. Nuts can also make a great snack.

## 2024-05-08 ENCOUNTER — HOSPITAL ENCOUNTER (INPATIENT)
Facility: HOSPITAL | Age: 74
LOS: 5 days | Discharge: HOME HEALTH | DRG: 291 | End: 2024-05-14
Attending: EMERGENCY MEDICINE | Admitting: INTERNAL MEDICINE
Payer: MEDICARE

## 2024-05-08 DIAGNOSIS — R07.9 ACUTE CHEST PAIN: ICD-10-CM

## 2024-05-08 DIAGNOSIS — R06.02 SOB (SHORTNESS OF BREATH): ICD-10-CM

## 2024-05-08 DIAGNOSIS — N17.9 AKI (ACUTE KIDNEY INJURY): ICD-10-CM

## 2024-05-08 DIAGNOSIS — I25.708 CORONARY ARTERY DISEASE OF BYPASS GRAFT OF NATIVE HEART WITH STABLE ANGINA PECTORIS: ICD-10-CM

## 2024-05-08 DIAGNOSIS — J81.0 ACUTE PULMONARY EDEMA: ICD-10-CM

## 2024-05-08 DIAGNOSIS — R07.9 CHEST PAIN: ICD-10-CM

## 2024-05-08 DIAGNOSIS — R06.09 DYSPNEA ON EXERTION: ICD-10-CM

## 2024-05-08 DIAGNOSIS — I10 PRIMARY HYPERTENSION: ICD-10-CM

## 2024-05-08 DIAGNOSIS — I50.9 ACUTE CONGESTIVE HEART FAILURE, UNSPECIFIED HEART FAILURE TYPE: Primary | ICD-10-CM

## 2024-05-08 DIAGNOSIS — R07.9 CHEST PAIN AT REST: ICD-10-CM

## 2024-05-08 PROBLEM — I50.33 ACUTE ON CHRONIC DIASTOLIC CONGESTIVE HEART FAILURE: Status: ACTIVE | Noted: 2024-05-08

## 2024-05-08 LAB
ALBUMIN SERPL BCP-MCNC: 3.5 G/DL (ref 3.5–5.2)
ALP SERPL-CCNC: 91 U/L (ref 55–135)
ALT SERPL W/O P-5'-P-CCNC: 13 U/L (ref 10–44)
ANION GAP SERPL CALC-SCNC: 11 MMOL/L (ref 8–16)
AST SERPL-CCNC: 16 U/L (ref 10–40)
BASOPHILS # BLD AUTO: 0.07 K/UL (ref 0–0.2)
BASOPHILS NFR BLD: 0.7 % (ref 0–1.9)
BILIRUB SERPL-MCNC: 0.3 MG/DL (ref 0.1–1)
BNP SERPL-MCNC: 312 PG/ML (ref 0–99)
BUN SERPL-MCNC: 31 MG/DL (ref 8–23)
CALCIUM SERPL-MCNC: 9.9 MG/DL (ref 8.7–10.5)
CHLORIDE SERPL-SCNC: 106 MMOL/L (ref 95–110)
CO2 SERPL-SCNC: 21 MMOL/L (ref 23–29)
CREAT SERPL-MCNC: 1.7 MG/DL (ref 0.5–1.4)
DIFFERENTIAL METHOD BLD: ABNORMAL
EOSINOPHIL # BLD AUTO: 0.2 K/UL (ref 0–0.5)
EOSINOPHIL NFR BLD: 2.4 % (ref 0–8)
ERYTHROCYTE [DISTWIDTH] IN BLOOD BY AUTOMATED COUNT: 14.6 % (ref 11.5–14.5)
EST. GFR  (NO RACE VARIABLE): 32 ML/MIN/1.73 M^2
GLUCOSE SERPL-MCNC: 93 MG/DL (ref 70–110)
HCT VFR BLD AUTO: 29.9 % (ref 37–48.5)
HGB BLD-MCNC: 9.6 G/DL (ref 12–16)
IMM GRANULOCYTES # BLD AUTO: 0.06 K/UL (ref 0–0.04)
IMM GRANULOCYTES NFR BLD AUTO: 0.6 % (ref 0–0.5)
LYMPHOCYTES # BLD AUTO: 1.2 K/UL (ref 1–4.8)
LYMPHOCYTES NFR BLD: 12.2 % (ref 18–48)
MCH RBC QN AUTO: 29.1 PG (ref 27–31)
MCHC RBC AUTO-ENTMCNC: 32.1 G/DL (ref 32–36)
MCV RBC AUTO: 91 FL (ref 82–98)
MONOCYTES # BLD AUTO: 0.7 K/UL (ref 0.3–1)
MONOCYTES NFR BLD: 7.6 % (ref 4–15)
NEUTROPHILS # BLD AUTO: 7.3 K/UL (ref 1.8–7.7)
NEUTROPHILS NFR BLD: 76.5 % (ref 38–73)
NRBC BLD-RTO: 0 /100 WBC
PLATELET # BLD AUTO: 394 K/UL (ref 150–450)
PLATELET BLD QL SMEAR: ABNORMAL
PMV BLD AUTO: 9.5 FL (ref 9.2–12.9)
POTASSIUM SERPL-SCNC: 4.9 MMOL/L (ref 3.5–5.1)
PROT SERPL-MCNC: 8.2 G/DL (ref 6–8.4)
RBC # BLD AUTO: 3.3 M/UL (ref 4–5.4)
SODIUM SERPL-SCNC: 138 MMOL/L (ref 136–145)
TROPONIN I SERPL DL<=0.01 NG/ML-MCNC: 0.01 NG/ML (ref 0–0.03)
TROPONIN I SERPL DL<=0.01 NG/ML-MCNC: 0.02 NG/ML (ref 0–0.03)
WBC # BLD AUTO: 9.58 K/UL (ref 3.9–12.7)

## 2024-05-08 PROCEDURE — G0378 HOSPITAL OBSERVATION PER HR: HCPCS

## 2024-05-08 PROCEDURE — 93010 ELECTROCARDIOGRAM REPORT: CPT | Mod: ,,, | Performed by: STUDENT IN AN ORGANIZED HEALTH CARE EDUCATION/TRAINING PROGRAM

## 2024-05-08 PROCEDURE — 63600175 PHARM REV CODE 636 W HCPCS: Performed by: EMERGENCY MEDICINE

## 2024-05-08 PROCEDURE — 99291 CRITICAL CARE FIRST HOUR: CPT

## 2024-05-08 PROCEDURE — 25000003 PHARM REV CODE 250: Performed by: INTERNAL MEDICINE

## 2024-05-08 PROCEDURE — 25000003 PHARM REV CODE 250: Performed by: NURSE PRACTITIONER

## 2024-05-08 PROCEDURE — 93005 ELECTROCARDIOGRAM TRACING: CPT

## 2024-05-08 PROCEDURE — 96375 TX/PRO/DX INJ NEW DRUG ADDON: CPT

## 2024-05-08 PROCEDURE — 63600175 PHARM REV CODE 636 W HCPCS: Performed by: NURSE PRACTITIONER

## 2024-05-08 PROCEDURE — 85025 COMPLETE CBC W/AUTO DIFF WBC: CPT | Performed by: EMERGENCY MEDICINE

## 2024-05-08 PROCEDURE — A4216 STERILE WATER/SALINE, 10 ML: HCPCS | Performed by: NURSE PRACTITIONER

## 2024-05-08 PROCEDURE — 83880 ASSAY OF NATRIURETIC PEPTIDE: CPT | Performed by: EMERGENCY MEDICINE

## 2024-05-08 PROCEDURE — 96374 THER/PROPH/DIAG INJ IV PUSH: CPT

## 2024-05-08 PROCEDURE — 84484 ASSAY OF TROPONIN QUANT: CPT | Mod: 91 | Performed by: NURSE PRACTITIONER

## 2024-05-08 PROCEDURE — 84484 ASSAY OF TROPONIN QUANT: CPT | Performed by: EMERGENCY MEDICINE

## 2024-05-08 PROCEDURE — 96372 THER/PROPH/DIAG INJ SC/IM: CPT | Performed by: NURSE PRACTITIONER

## 2024-05-08 PROCEDURE — 80053 COMPREHEN METABOLIC PANEL: CPT | Performed by: EMERGENCY MEDICINE

## 2024-05-08 PROCEDURE — 36415 COLL VENOUS BLD VENIPUNCTURE: CPT | Performed by: NURSE PRACTITIONER

## 2024-05-08 RX ORDER — IBUPROFEN 200 MG
16 TABLET ORAL
Status: DISCONTINUED | OUTPATIENT
Start: 2024-05-08 | End: 2024-05-14 | Stop reason: HOSPADM

## 2024-05-08 RX ORDER — MORPHINE SULFATE 4 MG/ML
2 INJECTION, SOLUTION INTRAMUSCULAR; INTRAVENOUS
Status: COMPLETED | OUTPATIENT
Start: 2024-05-08 | End: 2024-05-08

## 2024-05-08 RX ORDER — IBUPROFEN 200 MG
24 TABLET ORAL
Status: DISCONTINUED | OUTPATIENT
Start: 2024-05-08 | End: 2024-05-14 | Stop reason: HOSPADM

## 2024-05-08 RX ORDER — TALC
6 POWDER (GRAM) TOPICAL NIGHTLY PRN
Status: DISCONTINUED | OUTPATIENT
Start: 2024-05-08 | End: 2024-05-14 | Stop reason: HOSPADM

## 2024-05-08 RX ORDER — ONDANSETRON HYDROCHLORIDE 2 MG/ML
4 INJECTION, SOLUTION INTRAVENOUS EVERY 6 HOURS PRN
Status: DISCONTINUED | OUTPATIENT
Start: 2024-05-08 | End: 2024-05-14 | Stop reason: HOSPADM

## 2024-05-08 RX ORDER — GEMFIBROZIL 600 MG/1
600 TABLET, FILM COATED ORAL
Status: DISCONTINUED | OUTPATIENT
Start: 2024-05-09 | End: 2024-05-14 | Stop reason: HOSPADM

## 2024-05-08 RX ORDER — RANOLAZINE 500 MG/1
500 TABLET, EXTENDED RELEASE ORAL 2 TIMES DAILY
Status: ON HOLD | COMMUNITY
End: 2024-05-14 | Stop reason: HOSPADM

## 2024-05-08 RX ORDER — ACETAMINOPHEN 325 MG/1
650 TABLET ORAL EVERY 4 HOURS PRN
Status: DISCONTINUED | OUTPATIENT
Start: 2024-05-08 | End: 2024-05-14 | Stop reason: HOSPADM

## 2024-05-08 RX ORDER — ASPIRIN 81 MG/1
81 TABLET ORAL DAILY
Status: DISCONTINUED | OUTPATIENT
Start: 2024-05-09 | End: 2024-05-14 | Stop reason: HOSPADM

## 2024-05-08 RX ORDER — AMLODIPINE BESYLATE 5 MG/1
5 TABLET ORAL DAILY
Status: DISCONTINUED | OUTPATIENT
Start: 2024-05-09 | End: 2024-05-10

## 2024-05-08 RX ORDER — PROMETHAZINE HYDROCHLORIDE 25 MG/1
25 TABLET ORAL EVERY 6 HOURS PRN
Status: DISCONTINUED | OUTPATIENT
Start: 2024-05-08 | End: 2024-05-14 | Stop reason: HOSPADM

## 2024-05-08 RX ORDER — NALOXONE HCL 0.4 MG/ML
0.02 VIAL (ML) INJECTION
Status: DISCONTINUED | OUTPATIENT
Start: 2024-05-08 | End: 2024-05-14 | Stop reason: HOSPADM

## 2024-05-08 RX ORDER — POLYETHYLENE GLYCOL 3350 17 G/17G
17 POWDER, FOR SOLUTION ORAL DAILY PRN
Status: DISCONTINUED | OUTPATIENT
Start: 2024-05-08 | End: 2024-05-14 | Stop reason: HOSPADM

## 2024-05-08 RX ORDER — OXYCODONE AND ACETAMINOPHEN 10; 325 MG/1; MG/1
1 TABLET ORAL EVERY 6 HOURS PRN
Status: DISCONTINUED | OUTPATIENT
Start: 2024-05-08 | End: 2024-05-14 | Stop reason: HOSPADM

## 2024-05-08 RX ORDER — RANOLAZINE 500 MG/1
500 TABLET, EXTENDED RELEASE ORAL 2 TIMES DAILY
Status: DISCONTINUED | OUTPATIENT
Start: 2024-05-08 | End: 2024-05-14 | Stop reason: HOSPADM

## 2024-05-08 RX ORDER — SACUBITRIL AND VALSARTAN 24; 26 MG/1; MG/1
1 TABLET, FILM COATED ORAL 2 TIMES DAILY
COMMUNITY

## 2024-05-08 RX ORDER — AMITRIPTYLINE HYDROCHLORIDE 50 MG/1
150 TABLET, FILM COATED ORAL NIGHTLY
Status: DISCONTINUED | OUTPATIENT
Start: 2024-05-08 | End: 2024-05-14 | Stop reason: HOSPADM

## 2024-05-08 RX ORDER — FUROSEMIDE 10 MG/ML
60 INJECTION INTRAMUSCULAR; INTRAVENOUS
Status: COMPLETED | OUTPATIENT
Start: 2024-05-08 | End: 2024-05-08

## 2024-05-08 RX ORDER — GLUCAGON 1 MG
1 KIT INJECTION
Status: DISCONTINUED | OUTPATIENT
Start: 2024-05-08 | End: 2024-05-14 | Stop reason: HOSPADM

## 2024-05-08 RX ORDER — PANTOPRAZOLE SODIUM 40 MG/1
40 TABLET, DELAYED RELEASE ORAL DAILY
Status: DISCONTINUED | OUTPATIENT
Start: 2024-05-09 | End: 2024-05-14 | Stop reason: HOSPADM

## 2024-05-08 RX ORDER — FOLIC ACID 1 MG/1
1 TABLET ORAL DAILY
Status: DISCONTINUED | OUTPATIENT
Start: 2024-05-09 | End: 2024-05-14 | Stop reason: HOSPADM

## 2024-05-08 RX ORDER — ALUMINUM HYDROXIDE, MAGNESIUM HYDROXIDE, AND SIMETHICONE 1200; 120; 1200 MG/30ML; MG/30ML; MG/30ML
30 SUSPENSION ORAL 4 TIMES DAILY PRN
Status: DISCONTINUED | OUTPATIENT
Start: 2024-05-08 | End: 2024-05-14 | Stop reason: HOSPADM

## 2024-05-08 RX ORDER — SODIUM CHLORIDE 0.9 % (FLUSH) 0.9 %
3 SYRINGE (ML) INJECTION EVERY 8 HOURS
Status: DISCONTINUED | OUTPATIENT
Start: 2024-05-08 | End: 2024-05-14 | Stop reason: HOSPADM

## 2024-05-08 RX ORDER — MORPHINE SULFATE 4 MG/ML
2 INJECTION, SOLUTION INTRAMUSCULAR; INTRAVENOUS EVERY 4 HOURS PRN
Status: DISCONTINUED | OUTPATIENT
Start: 2024-05-08 | End: 2024-05-14 | Stop reason: HOSPADM

## 2024-05-08 RX ORDER — ENOXAPARIN SODIUM 100 MG/ML
40 INJECTION SUBCUTANEOUS EVERY 24 HOURS
Status: DISCONTINUED | OUTPATIENT
Start: 2024-05-08 | End: 2024-05-08

## 2024-05-08 RX ORDER — ONDANSETRON HYDROCHLORIDE 2 MG/ML
4 INJECTION, SOLUTION INTRAVENOUS
Status: COMPLETED | OUTPATIENT
Start: 2024-05-08 | End: 2024-05-08

## 2024-05-08 RX ADMIN — ONDANSETRON 4 MG: 2 INJECTION INTRAMUSCULAR; INTRAVENOUS at 07:05

## 2024-05-08 RX ADMIN — OXYCODONE AND ACETAMINOPHEN 1 TABLET: 10; 325 TABLET ORAL at 09:05

## 2024-05-08 RX ADMIN — TICAGRELOR 90 MG: 90 TABLET ORAL at 09:05

## 2024-05-08 RX ADMIN — FUROSEMIDE 60 MG: 10 INJECTION, SOLUTION INTRAMUSCULAR; INTRAVENOUS at 06:05

## 2024-05-08 RX ADMIN — MORPHINE SULFATE 2 MG: 4 INJECTION INTRAVENOUS at 07:05

## 2024-05-08 RX ADMIN — METHOCARBAMOL 1000 MG: 100 INJECTION, SOLUTION INTRAMUSCULAR; INTRAVENOUS at 09:05

## 2024-05-08 RX ADMIN — AMITRIPTYLINE HYDROCHLORIDE 150 MG: 50 TABLET, FILM COATED ORAL at 09:05

## 2024-05-08 RX ADMIN — RANOLAZINE 500 MG: 500 TABLET, EXTENDED RELEASE ORAL at 09:05

## 2024-05-08 RX ADMIN — SACUBITRIL AND VALSARTAN 1 TABLET: 24; 26 TABLET, FILM COATED ORAL at 09:05

## 2024-05-08 RX ADMIN — Medication 3 ML: at 09:05

## 2024-05-08 RX ADMIN — ENOXAPARIN SODIUM 40 MG: 40 INJECTION SUBCUTANEOUS at 09:05

## 2024-05-08 RX ADMIN — METOPROLOL SUCCINATE 75 MG: 25 TABLET, FILM COATED, EXTENDED RELEASE ORAL at 09:05

## 2024-05-08 NOTE — ED PROVIDER NOTES
SCRIBE #1 NOTE: I, Gagan Lagos, am scribing for, and in the presence of, North Ying Jr., MD. I have scribed the entire note.       History     Chief Complaint   Patient presents with    Chest Pain     Patient presents to ED with c/o chest pain and SOB x 3 days. Patient was recently in [place] for stent placement recently.     Review of patient's allergies indicates:   Allergen Reactions    Ace inhibitors Swelling     Other reaction(s): tongue swelling  Other reaction(s): Other (See Comments)  Other reaction(s): tongue swelling      Bacitracin     Ezetimibe     Lisinopril      Other reaction(s): Not available    Neomycin     Niacin     Polymyxin b     Atorvastatin Rash    Rosuvastatin Nausea And Vomiting    Statins-hmg-coa reductase inhibitors Nausea And Vomiting     Other reaction(s): Not available         History of Present Illness     HPI    5/8/2024, 5:51 PM  History obtained from the daughter and patient      History of Present Illness: Paola Wood is a 73 y.o. female patient with a PMHx of HTN, CAD, and CHF who presents to the Emergency Department for evaluation of SOB which onset 3 days PTA. Patients daughter reports that she recently had 6 stents placed approximately 4 weeks PTA.  Patient had a failed multivessel bypass remotely.  Patient states that she becomes SOB at the slightest exertion.  Patient was now gets short of breath even talking.  Symptoms are constant and moderate in severity. Patient denies any fever, chills, n/v/d, numbness. HA, and all other sxs at this time. No further complaints or concerns at this time.  Patient was denies any calf pain or swelling.      Arrival mode: Personal vehicle    PCP: Grace Duron MD        Past Medical History:  Past Medical History:   Diagnosis Date    Allergy     AR (allergic rhinitis)     Asthma     CAD (coronary artery disease)     CHF (congestive heart failure)     Chronic pain     neck/back/arm/knee    Hypertension     Seizures         Past Surgical History:  Past Surgical History:   Procedure Laterality Date    CATARACT EXTRACTION      HYSTERECTOMY  1983    partial (Ovaries removed 1998)    OOPHORECTOMY  1998         Family History:  Family History   Problem Relation Name Age of Onset    Heart disease Mother      Heart disease Father      No Known Problems Maternal Grandmother      No Known Problems Maternal Grandfather      No Known Problems Paternal Grandmother      No Known Problems Paternal Grandfather      Breast cancer Sister  53       Social History:  Social History     Tobacco Use    Smoking status: Never    Smokeless tobacco: Never   Substance and Sexual Activity    Alcohol use: No    Drug use: No    Sexual activity: Not Currently        Review of Systems     Review of Systems   Constitutional:  Negative for chills and fever.   HENT:  Negative for sore throat.    Respiratory:  Positive for cough and shortness of breath.    Cardiovascular:  Positive for chest pain.   Gastrointestinal:  Negative for diarrhea, nausea and vomiting.   Genitourinary:  Negative for dysuria.   Musculoskeletal:  Negative for back pain.   Skin:  Negative for rash.   Neurological:  Negative for weakness, numbness and headaches.   Hematological:  Does not bruise/bleed easily.   All other systems reviewed and are negative.       Physical Exam     Initial Vitals [05/08/24 1720]   BP Pulse Resp Temp SpO2   (!) 98/50 69 20 98.3 °F (36.8 °C) 100 %      MAP       --          Physical Exam  Nursing Notes and Vital Signs Reviewed.  Constitutional: Patient is in no acute distress. Well-developed and well-nourished.  Head: Atraumatic. Normocephalic.  Eyes:  EOM intact.  No scleral icterus.  ENT: Mucous membranes are moist.  Nares clear   Neck:  Full ROM. No JVD.  Cardiovascular: Regular rate. Regular rhythm No murmurs, rubs, or gallops. Distal pulses are 2+ and symmetric  Pulmonary/Chest: No respiratory distress. Clear to auscultation bilaterally. No wheezing or rales.   Equal chest wall rise bilaterally  Abdominal: Soft and non-distended.  There is no tenderness.  No rebound, guarding, or rigidity. Good bowel sounds.  Genitourinary: No CVA tenderness.  No suprapubic tenderness  Musculoskeletal: Moves all extremities. No obvious deformities.  5 x 5 strength in all extremities .  No calf swelling.  No tenderness.  No palpable cord  Skin: Warm and dry.  Neurological:  Alert, awake, and appropriate.  Normal speech.  No acute focal neurological deficits are appreciated.  Two through 12 intact bilaterally.  Psychiatric: Normal affect. Good eye contact. Appropriate in content.       ED Course   Critical Care    Date/Time: 5/8/2024 7:16 PM    Performed by: North Ying Jr., MD  Authorized by: Huma Solorio MD  Direct patient critical care time: 30 minutes  Additional history critical care time: 15 minutes  Ordering / reviewing critical care time: 10 minutes  Documentation critical care time: 5 minutes  Consulting other physicians critical care time: 5 minutes  Total critical care time (exclusive of procedural time) : 65 minutes  Critical care time was exclusive of separately billable procedures and treating other patients and teaching time.  Critical care was necessary to treat or prevent imminent or life-threatening deterioration of the following conditions: Pulmonary Edema.  Critical care was time spent personally by me on the following activities: blood draw for specimens, development of treatment plan with patient or surrogate, discussions with consultants, discussions with primary provider, interpretation of cardiac output measurements, evaluation of patient's response to treatment, examination of patient, obtaining history from patient or surrogate, ordering and performing treatments and interventions, ordering and review of laboratory studies, ordering and review of radiographic studies, re-evaluation of patient's condition and review of old charts.        ED Vital  "Signs:  Vitals:    05/08/24 1720 05/08/24 1800 05/08/24 1830 05/08/24 1902   BP: (!) 98/50 (!) 140/63 133/60 137/64   Pulse: 69 67 74 67   Resp: 20 (!) 30 20 16   Temp: 98.3 °F (36.8 °C)      TempSrc: Oral      SpO2: 100% 100% 95% 96%   Height: 5' 3" (1.6 m)       05/08/24 1930   BP:    Pulse:    Resp: 20   Temp:    TempSrc:    SpO2:    Height:        Abnormal Lab Results:  Labs Reviewed   CBC W/ AUTO DIFFERENTIAL - Abnormal; Notable for the following components:       Result Value    RBC 3.30 (*)     Hemoglobin 9.6 (*)     Hematocrit 29.9 (*)     RDW 14.6 (*)     Immature Granulocytes 0.6 (*)     Immature Grans (Abs) 0.06 (*)     Gran % 76.5 (*)     Lymph % 12.2 (*)     All other components within normal limits   COMPREHENSIVE METABOLIC PANEL - Abnormal; Notable for the following components:    CO2 21 (*)     BUN 31 (*)     Creatinine 1.7 (*)     eGFR 32 (*)     All other components within normal limits   B-TYPE NATRIURETIC PEPTIDE - Abnormal; Notable for the following components:     (*)     All other components within normal limits   TROPONIN I        All Lab Results:  Results for orders placed or performed during the hospital encounter of 05/08/24   CBC auto differential   Result Value Ref Range    WBC 9.58 3.90 - 12.70 K/uL    RBC 3.30 (L) 4.00 - 5.40 M/uL    Hemoglobin 9.6 (L) 12.0 - 16.0 g/dL    Hematocrit 29.9 (L) 37.0 - 48.5 %    MCV 91 82 - 98 fL    MCH 29.1 27.0 - 31.0 pg    MCHC 32.1 32.0 - 36.0 g/dL    RDW 14.6 (H) 11.5 - 14.5 %    Platelets 394 150 - 450 K/uL    MPV 9.5 9.2 - 12.9 fL    Immature Granulocytes 0.6 (H) 0.0 - 0.5 %    Gran # (ANC) 7.3 1.8 - 7.7 K/uL    Immature Grans (Abs) 0.06 (H) 0.00 - 0.04 K/uL    Lymph # 1.2 1.0 - 4.8 K/uL    Mono # 0.7 0.3 - 1.0 K/uL    Eos # 0.2 0.0 - 0.5 K/uL    Baso # 0.07 0.00 - 0.20 K/uL    nRBC 0 0 /100 WBC    Gran % 76.5 (H) 38.0 - 73.0 %    Lymph % 12.2 (L) 18.0 - 48.0 %    Mono % 7.6 4.0 - 15.0 %    Eosinophil % 2.4 0.0 - 8.0 %    Basophil % 0.7 0.0 - " 1.9 %    Platelet Estimate Appears normal     Differential Method Automated    Comprehensive metabolic panel   Result Value Ref Range    Sodium 138 136 - 145 mmol/L    Potassium 4.9 3.5 - 5.1 mmol/L    Chloride 106 95 - 110 mmol/L    CO2 21 (L) 23 - 29 mmol/L    Glucose 93 70 - 110 mg/dL    BUN 31 (H) 8 - 23 mg/dL    Creatinine 1.7 (H) 0.5 - 1.4 mg/dL    Calcium 9.9 8.7 - 10.5 mg/dL    Total Protein 8.2 6.0 - 8.4 g/dL    Albumin 3.5 3.5 - 5.2 g/dL    Total Bilirubin 0.3 0.1 - 1.0 mg/dL    Alkaline Phosphatase 91 55 - 135 U/L    AST 16 10 - 40 U/L    ALT 13 10 - 44 U/L    eGFR 32 (A) >60 mL/min/1.73 m^2    Anion Gap 11 8 - 16 mmol/L   Troponin I #1   Result Value Ref Range    Troponin I 0.011 0.000 - 0.026 ng/mL   BNP   Result Value Ref Range     (H) 0 - 99 pg/mL         Imaging Results:  Imaging Results              X-Ray Chest AP Portable (Final result)  Result time 05/08/24 18:28:45      Final result by Jeferson Ross MD (05/08/24 18:28:45)                   Impression:      Mild perihilar interstitial hazy opacities suggestive of element of pulmonary edema.      Electronically signed by: Jeferson Ross  Date:    05/08/2024  Time:    18:28               Narrative:    EXAMINATION:  XR CHEST AP PORTABLE    CLINICAL HISTORY:  Chest Pain;    TECHNIQUE:  Single frontal view of the chest was performed.    COMPARISON:  None    FINDINGS:  Median sternotomy.  Prominent cardiac silhouette.  Mild perihilar edema.  Vascular calcification.  Cervical hardware.  Trace left-sided pleural effusion not excluded.    Bones are intact.                                       The EKG was ordered, reviewed, and independently interpreted by the ED provider.  Interpretation time: 17:16  Rate: 72 BPM  Rhythm: normal sinus rhythm with sinus arrhythmia  Interpretation: Nonspecific T wave abnormality no longer evident in inferior leads. T wave inversion now evident in Lateral leads. No STEMI.             The Emergency Provider reviewed  the vital signs and test results, which are outlined above.     ED Discussion       7:09 PM: Discussed case with Dr. Solorio ([facility]). Dr. Solorio agrees with current care and management of pt and accepts admission.   Admitting Service: Obs  Admitting Physician: Dr. Solorio  Admit to: [facility]      ED Course as of 05/08/24 1937   Wed May 08, 2024   1828 Cardiac monitor interpretation  Independent interpretation  Indication: Chest pain  Normal sinus rhythm.  Rate 67.  No STEMI [RT]      ED Course User Index  [RT] North Ying Jr., MD     Medical Decision Making  Differential diagnosis:  Pneumonia, shortness breath, CHF, asthma, COPD    Patient was known coronary artery disease with intermittent chest pain worsening dyspnea on exertion since released from [place] from recent 6 stent placement.  Patient was prior history of failed multi-vessel bypass as well.  Patient was notes that her symptoms have become exceedingly worsened now she gets short of breath just talking.  Physical exam is benign.  Chest x-ray shows pedal edema with the patient was treated with Lasix.  Light of her room and chest pain hospital Medicine graciously accepts the patient for admission.  Indication for critical care time is the pulmonary edema and high-risk chest pain    Amount and/or Complexity of Data Reviewed  Labs: ordered. Decision-making details documented in ED Course.     Details: Normal white count with a hemoglobin of 9.6.  Patient was a normal troponin with an elevated BNP at 3:12 a.m..  She was mild renal insufficiency with a creatinine of 1.7 BUN of 31.  LFTs are normal.  Electrolytes are benign.    Radiology: ordered. Decision-making details documented in ED Course.     Details: Mild perihilar haziness consistent with pulmonary edema.  This fits the patient's clinical picture  ECG/medicine tests: ordered and independent interpretation performed. Decision-making details documented in ED Course.     Details: No  STEMI  Discussion of management or test interpretation with external provider(s): Discussed the case with Dr. Solorio with hospital medicine graciously accepts the patient for admission    Risk  OTC drugs.  Prescription drug management.  Drug therapy requiring intensive monitoring for toxicity.  Decision regarding hospitalization.    Critical Care  Total time providing critical care: 65 minutes       Additional MDM:   Heart Score:    History:          Highly suspicious.  ECG:             Normal  Age:               >65 years  Risk factors: >= 3 risk factors or history of atherosclerotic disease  Troponin:       Less than or equal to normal limit  Heart Score = 6                ED Medication(s):  Medications   furosemide injection 60 mg (60 mg Intravenous Given 5/8/24 1857)   morphine injection 2 mg (2 mg Intravenous Given 5/8/24 1930)   ondansetron injection 4 mg (4 mg Intravenous Given 5/8/24 1929)       New Prescriptions    No medications on file               Scribe Attestation:   Scribe #1: I performed the above scribed service and the documentation accurately describes the services I performed. I attest to the accuracy of the note.     Attending:   Physician Attestation Statement for Scribe #1: I, North Ying Jr., MD, personally performed the services described in this documentation, as scribed by Gagan Lagos, in my presence, and it is both accurate and complete.           Clinical Impression       ICD-10-CM ICD-9-CM   1. Acute congestive heart failure, unspecified heart failure type  I50.9 428.0   2. Chest pain at rest  R07.9 786.50   3. Chest pain  R07.9 786.50   4. Dyspnea on exertion  R06.09 786.09   5. Acute pulmonary edema  J81.0 518.4       Disposition:   Disposition: Placed in Observation  Condition: North Rodas Jr., MD  05/08/24 1937

## 2024-05-09 PROBLEM — R06.09 DOE (DYSPNEA ON EXERTION): Status: ACTIVE | Noted: 2024-05-09

## 2024-05-09 LAB
ALBUMIN SERPL BCP-MCNC: 3.2 G/DL (ref 3.5–5.2)
ALP SERPL-CCNC: 83 U/L (ref 55–135)
ALT SERPL W/O P-5'-P-CCNC: 11 U/L (ref 10–44)
ANION GAP SERPL CALC-SCNC: 11 MMOL/L (ref 8–16)
AORTIC ROOT ANNULUS: 2.64 CM
ASCENDING AORTA: 2.47 CM
AST SERPL-CCNC: 13 U/L (ref 10–40)
AV INDEX (PROSTH): 0.89
AV MEAN GRADIENT: 5 MMHG
AV PEAK GRADIENT: 8 MMHG
AV VALVE AREA BY VELOCITY RATIO: 2.07 CM²
AV VALVE AREA: 2.68 CM²
AV VELOCITY RATIO: 0.69
BASOPHILS # BLD AUTO: 0.08 K/UL (ref 0–0.2)
BASOPHILS NFR BLD: 0.9 % (ref 0–1.9)
BILIRUB SERPL-MCNC: 0.2 MG/DL (ref 0.1–1)
BSA FOR ECHO PROCEDURE: 1.75 M2
BUN SERPL-MCNC: 34 MG/DL (ref 8–23)
CALCIUM SERPL-MCNC: 9.2 MG/DL (ref 8.7–10.5)
CHLORIDE SERPL-SCNC: 105 MMOL/L (ref 95–110)
CO2 SERPL-SCNC: 23 MMOL/L (ref 23–29)
CREAT SERPL-MCNC: 1.7 MG/DL (ref 0.5–1.4)
CV ECHO LV RWT: 0.54 CM
DIFFERENTIAL METHOD BLD: ABNORMAL
DOP CALC AO PEAK VEL: 1.43 M/S
DOP CALC AO VTI: 24.2 CM
DOP CALC LVOT AREA: 3 CM2
DOP CALC LVOT DIAMETER: 1.96 CM
DOP CALC LVOT PEAK VEL: 0.98 M/S
DOP CALC LVOT STROKE VOLUME: 64.84 CM3
DOP CALC RVOT PEAK VEL: 0.66 M/S
DOP CALC RVOT VTI: 17.2 CM
DOP CALCLVOT PEAK VEL VTI: 21.5 CM
E WAVE DECELERATION TIME: 215.38 MSEC
E/A RATIO: 0.94
E/E' RATIO: 14.5 M/S
ECHO LV POSTERIOR WALL: 1.06 CM (ref 0.6–1.1)
EJECTION FRACTION: 60 %
EOSINOPHIL # BLD AUTO: 0.3 K/UL (ref 0–0.5)
EOSINOPHIL NFR BLD: 2.8 % (ref 0–8)
ERYTHROCYTE [DISTWIDTH] IN BLOOD BY AUTOMATED COUNT: 14.6 % (ref 11.5–14.5)
EST. GFR  (NO RACE VARIABLE): 32 ML/MIN/1.73 M^2
FRACTIONAL SHORTENING: 36 % (ref 28–44)
GLUCOSE SERPL-MCNC: 96 MG/DL (ref 70–110)
HCT VFR BLD AUTO: 31 % (ref 37–48.5)
HGB BLD-MCNC: 9.8 G/DL (ref 12–16)
IMM GRANULOCYTES # BLD AUTO: 0.05 K/UL (ref 0–0.04)
IMM GRANULOCYTES NFR BLD AUTO: 0.5 % (ref 0–0.5)
INTERVENTRICULAR SEPTUM: 1.09 CM (ref 0.6–1.1)
IVC DIAMETER: 1.41 CM
IVRT: 65.65 MSEC
LA MAJOR: 4.72 CM
LA MINOR: 4.67 CM
LA WIDTH: 3 CM
LEFT ATRIUM SIZE: 3.17 CM
LEFT ATRIUM VOLUME INDEX: 22.1 ML/M2
LEFT ATRIUM VOLUME: 37.95 CM3
LEFT INTERNAL DIMENSION IN SYSTOLE: 2.5 CM (ref 2.1–4)
LEFT VENTRICLE DIASTOLIC VOLUME INDEX: 38.8 ML/M2
LEFT VENTRICLE DIASTOLIC VOLUME: 66.74 ML
LEFT VENTRICLE MASS INDEX: 79 G/M2
LEFT VENTRICLE SYSTOLIC VOLUME INDEX: 13 ML/M2
LEFT VENTRICLE SYSTOLIC VOLUME: 22.35 ML
LEFT VENTRICULAR INTERNAL DIMENSION IN DIASTOLE: 3.92 CM (ref 3.5–6)
LEFT VENTRICULAR MASS: 136.56 G
LV LATERAL E/E' RATIO: 12.43 M/S
LV SEPTAL E/E' RATIO: 17.4 M/S
LVOT MG: 2.77 MMHG
LVOT MV: 0.83 CM/S
LYMPHOCYTES # BLD AUTO: 1.8 K/UL (ref 1–4.8)
LYMPHOCYTES NFR BLD: 19.7 % (ref 18–48)
MAGNESIUM SERPL-MCNC: 2.4 MG/DL (ref 1.6–2.6)
MCH RBC QN AUTO: 28.9 PG (ref 27–31)
MCHC RBC AUTO-ENTMCNC: 31.6 G/DL (ref 32–36)
MCV RBC AUTO: 91 FL (ref 82–98)
MONOCYTES # BLD AUTO: 0.9 K/UL (ref 0.3–1)
MONOCYTES NFR BLD: 10.1 % (ref 4–15)
MV PEAK A VEL: 0.93 M/S
MV PEAK E VEL: 0.87 M/S
MV STENOSIS PRESSURE HALF TIME: 62.46 MS
MV VALVE AREA P 1/2 METHOD: 3.52 CM2
NEUTROPHILS # BLD AUTO: 6 K/UL (ref 1.8–7.7)
NEUTROPHILS NFR BLD: 66 % (ref 38–73)
NRBC BLD-RTO: 0 /100 WBC
OHS QRS DURATION: 80 MS
OHS QTC CALCULATION: 407 MS
PISA MRMAX VEL: 3.28 M/S
PISA TR MAX VEL: 2.14 M/S
PLATELET # BLD AUTO: 338 K/UL (ref 150–450)
PMV BLD AUTO: 9.3 FL (ref 9.2–12.9)
POTASSIUM SERPL-SCNC: 4.3 MMOL/L (ref 3.5–5.1)
PROT SERPL-MCNC: 7.4 G/DL (ref 6–8.4)
PV MEAN GRADIENT: 1 MMHG
RA MAJOR: 3.57 CM
RA PRESSURE ESTIMATED: 3 MMHG
RA WIDTH: 2.6 CM
RBC # BLD AUTO: 3.39 M/UL (ref 4–5.4)
RV TB RVSP: 5 MMHG
SODIUM SERPL-SCNC: 139 MMOL/L (ref 136–145)
STJ: 2.57 CM
TDI LATERAL: 0.07 M/S
TDI SEPTAL: 0.05 M/S
TDI: 0.06 M/S
TR MAX PG: 18 MMHG
TR MEAN GRADIENT: 14 MMHG
TRICUSPID ANNULAR PLANE SYSTOLIC EXCURSION: 1.75 CM
TROPONIN I SERPL DL<=0.01 NG/ML-MCNC: 0.02 NG/ML (ref 0–0.03)
TROPONIN I SERPL DL<=0.01 NG/ML-MCNC: 0.03 NG/ML (ref 0–0.03)
TV REST PULMONARY ARTERY PRESSURE: 21 MMHG
WBC # BLD AUTO: 9.13 K/UL (ref 3.9–12.7)
Z-SCORE OF LEFT VENTRICULAR DIMENSION IN END DIASTOLE: -1.96
Z-SCORE OF LEFT VENTRICULAR DIMENSION IN END SYSTOLE: -1.32

## 2024-05-09 PROCEDURE — 93005 ELECTROCARDIOGRAM TRACING: CPT

## 2024-05-09 PROCEDURE — 93010 ELECTROCARDIOGRAM REPORT: CPT | Mod: ,,, | Performed by: STUDENT IN AN ORGANIZED HEALTH CARE EDUCATION/TRAINING PROGRAM

## 2024-05-09 PROCEDURE — 21400001 HC TELEMETRY ROOM

## 2024-05-09 PROCEDURE — 63600175 PHARM REV CODE 636 W HCPCS: Performed by: PHYSICIAN ASSISTANT

## 2024-05-09 PROCEDURE — 63600175 PHARM REV CODE 636 W HCPCS: Performed by: INTERNAL MEDICINE

## 2024-05-09 PROCEDURE — 83735 ASSAY OF MAGNESIUM: CPT | Performed by: NURSE PRACTITIONER

## 2024-05-09 PROCEDURE — 25000003 PHARM REV CODE 250: Performed by: NURSE PRACTITIONER

## 2024-05-09 PROCEDURE — 36415 COLL VENOUS BLD VENIPUNCTURE: CPT | Performed by: NURSE PRACTITIONER

## 2024-05-09 PROCEDURE — 85025 COMPLETE CBC W/AUTO DIFF WBC: CPT | Performed by: NURSE PRACTITIONER

## 2024-05-09 PROCEDURE — 25000003 PHARM REV CODE 250: Performed by: INTERNAL MEDICINE

## 2024-05-09 PROCEDURE — 84484 ASSAY OF TROPONIN QUANT: CPT | Mod: 91 | Performed by: NURSE PRACTITIONER

## 2024-05-09 PROCEDURE — A4216 STERILE WATER/SALINE, 10 ML: HCPCS | Performed by: NURSE PRACTITIONER

## 2024-05-09 PROCEDURE — 80053 COMPREHEN METABOLIC PANEL: CPT | Performed by: NURSE PRACTITIONER

## 2024-05-09 PROCEDURE — 99223 1ST HOSP IP/OBS HIGH 75: CPT | Mod: 25,,, | Performed by: STUDENT IN AN ORGANIZED HEALTH CARE EDUCATION/TRAINING PROGRAM

## 2024-05-09 RX ORDER — METOPROLOL SUCCINATE 25 MG/1
25 TABLET, EXTENDED RELEASE ORAL 2 TIMES DAILY
Status: DISCONTINUED | OUTPATIENT
Start: 2024-05-09 | End: 2024-05-14 | Stop reason: HOSPADM

## 2024-05-09 RX ORDER — FUROSEMIDE 10 MG/ML
40 INJECTION INTRAMUSCULAR; INTRAVENOUS ONCE
Status: COMPLETED | OUTPATIENT
Start: 2024-05-09 | End: 2024-05-09

## 2024-05-09 RX ADMIN — ASPIRIN 81 MG: 81 TABLET, COATED ORAL at 09:05

## 2024-05-09 RX ADMIN — FOLIC ACID 1 MG: 1 TABLET ORAL at 09:05

## 2024-05-09 RX ADMIN — TICAGRELOR 90 MG: 90 TABLET ORAL at 09:05

## 2024-05-09 RX ADMIN — Medication 3 ML: at 06:05

## 2024-05-09 RX ADMIN — MORPHINE SULFATE 2 MG: 4 INJECTION INTRAVENOUS at 10:05

## 2024-05-09 RX ADMIN — OXYCODONE AND ACETAMINOPHEN 1 TABLET: 10; 325 TABLET ORAL at 10:05

## 2024-05-09 RX ADMIN — POLYETHYLENE GLYCOL 3350 17 G: 17 POWDER, FOR SOLUTION ORAL at 07:05

## 2024-05-09 RX ADMIN — Medication 3 ML: at 03:05

## 2024-05-09 RX ADMIN — METOPROLOL SUCCINATE 75 MG: 25 TABLET, FILM COATED, EXTENDED RELEASE ORAL at 12:05

## 2024-05-09 RX ADMIN — RANOLAZINE 500 MG: 500 TABLET, EXTENDED RELEASE ORAL at 09:05

## 2024-05-09 RX ADMIN — AMITRIPTYLINE HYDROCHLORIDE 150 MG: 50 TABLET, FILM COATED ORAL at 08:05

## 2024-05-09 RX ADMIN — RANOLAZINE 500 MG: 500 TABLET, EXTENDED RELEASE ORAL at 08:05

## 2024-05-09 RX ADMIN — FUROSEMIDE 40 MG: 10 INJECTION, SOLUTION INTRAMUSCULAR; INTRAVENOUS at 04:05

## 2024-05-09 RX ADMIN — GEMFIBROZIL 600 MG: 600 TABLET ORAL at 04:05

## 2024-05-09 RX ADMIN — PANTOPRAZOLE SODIUM 40 MG: 40 TABLET, DELAYED RELEASE ORAL at 09:05

## 2024-05-09 RX ADMIN — GEMFIBROZIL 600 MG: 600 TABLET ORAL at 06:05

## 2024-05-09 RX ADMIN — TICAGRELOR 90 MG: 90 TABLET ORAL at 08:05

## 2024-05-09 RX ADMIN — OXYCODONE AND ACETAMINOPHEN 1 TABLET: 10; 325 TABLET ORAL at 05:05

## 2024-05-09 NOTE — SUBJECTIVE & OBJECTIVE
Past Medical History:   Diagnosis Date    Allergy     AR (allergic rhinitis)     Asthma     CAD (coronary artery disease)     CHF (congestive heart failure)     Chronic pain     neck/back/arm/knee    GERD (gastroesophageal reflux disease)     Hypertension     Seizures     SOB (shortness of breath)     TIA (transient ischemic attack)        Past Surgical History:   Procedure Laterality Date    CATARACT EXTRACTION      CORONARY ANGIOPLASTY WITH STENT PLACEMENT      CORONARY ARTERY BYPASS GRAFT      HYSTERECTOMY  1983    partial (Ovaries removed 1998)    OOPHORECTOMY  1998       Review of patient's allergies indicates:   Allergen Reactions    Ace inhibitors Swelling     Other reaction(s): tongue swelling  Other reaction(s): Other (See Comments)  Other reaction(s): tongue swelling      Bacitracin     Ezetimibe     Lisinopril      Other reaction(s): Not available    Neomycin     Niacin     Polymyxin b     Atorvastatin Rash    Rosuvastatin Nausea And Vomiting    Statins-hmg-coa reductase inhibitors Nausea And Vomiting     Other reaction(s): Not available       Current Facility-Administered Medications on File Prior to Encounter   Medication    [DISCONTINUED] GENERIC EXTERNAL MEDICATION    [DISCONTINUED] GENERIC EXTERNAL MEDICATION     Current Outpatient Medications on File Prior to Encounter   Medication Sig    amitriptyline (ELAVIL) 150 MG Tab Take 150 mg by mouth every evening.    amLODIPine (NORVASC) 5 MG tablet TAKE 1 TABLET BY MOUTH ONCE DAILY    aspirin (ECOTRIN) 81 MG EC tablet Take 81 mg by mouth once daily.    metoprolol succinate (TOPROL XL) 50 MG 24 hr tablet 1 and 1/2 in the AM.  1 and 1/2 at night.    nitroGLYCERIN (NITROSTAT) 0.4 MG SL tablet USE AS DIRECTED    oxyCODONE-acetaminophen (PERCOCET)  mg per tablet Take 1 tablet by mouth every 4 (four) hours as needed.    pantoprazole (PROTONIX) 40 MG tablet Take 1 tablet (40 mg total) by mouth once daily. PROTONIX 40 MG TBEC    alirocumab (PRALUENT PEN) 75  mg/mL PnIj Inject 1 mL (75 mg total) into the skin every 14 (fourteen) days.    chlorthalidone (HYGROTEN) 25 MG Tab Take 25 mg by mouth every morning.    cloNIDine (CATAPRES) 0.2 MG tablet Take 1 tablet (0.2 mg total) by mouth as needed.    clopidogrel (PLAVIX) 75 mg tablet Take 75 mg by mouth once daily.    cyproheptadine (PERIACTIN) 4 mg tablet Take 4 mg by mouth 3 (three) times daily as needed.    folic acid (FOLVITE) 1 MG tablet Take 1 tablet (1 mg total) by mouth once daily. FOLIC ACID 1 MG TABS    furosemide (LASIX) 20 MG tablet Take 20 mg by mouth.    gemfibroziL (LOPID) 600 MG tablet Take 1 tablet (600 mg total) by mouth in the morning and 1 tablet (600 mg total) in the evening. Take before meals.    hydrALAZINE (APRESOLINE) 50 MG tablet Take 50 mg by mouth 3 (three) times daily.    isosorbide mononitrate (IMDUR) 30 MG 24 hr tablet Take 30 mg by mouth every morning.    losartan (COZAAR) 50 MG tablet TAKE 1 TABLET BY MOUTH EVERY DAY    potassium chloride SA (K-DUR,KLOR-CON) 20 MEQ tablet Take 20 mEq by mouth once daily.    ranolazine (RANEXA) 500 MG Tb12 Take 500 mg by mouth 2 (two) times daily.    ranolazine (RANEXA) 500 MG Tb12 Take 500 mg by mouth 2 (two) times daily.    sacubitriL-valsartan (ENTRESTO) 24-26 mg per tablet Take 1 tablet by mouth 2 (two) times daily.    ticagrelor (BRILINTA) 90 mg tablet Take 90 mg by mouth 2 (two) times daily.     Family History       Problem Relation (Age of Onset)    Breast cancer Sister (53)    Heart disease Mother, Father    No Known Problems Maternal Grandmother, Maternal Grandfather, Paternal Grandmother, Paternal Grandfather          Tobacco Use    Smoking status: Never    Smokeless tobacco: Never   Substance and Sexual Activity    Alcohol use: No    Drug use: No    Sexual activity: Not Currently     Review of Systems   Constitutional:  Positive for fatigue.   HENT: Negative.     Eyes: Negative.    Respiratory:  Positive for cough and shortness of breath. Negative  for wheezing.    Cardiovascular:  Positive for chest pain. Negative for palpitations and leg swelling.   Gastrointestinal:  Negative for abdominal pain, constipation, diarrhea, nausea and vomiting.   Endocrine: Negative.    Genitourinary: Negative.    Musculoskeletal:  Positive for back pain and neck pain.        Upper back pain and left sided neck pain   Skin: Negative.    Neurological:  Negative for dizziness, syncope, weakness, light-headedness, numbness and headaches.   Psychiatric/Behavioral: Negative.     All other systems reviewed and are negative.    Objective:     Vital Signs (Most Recent):  Temp: 98.3 °F (36.8 °C) (05/08/24 1720)  Pulse: 67 (05/08/24 2003)  Resp: 13 (05/08/24 2003)  BP: (!) 137/94 (05/08/24 2003)  SpO2: 100 % (05/08/24 2003) Vital Signs (24h Range):  Temp:  [98.3 °F (36.8 °C)] 98.3 °F (36.8 °C)  Pulse:  [67-74] 67  Resp:  [13-30] 13  SpO2:  [95 %-100 %] 100 %  BP: ()/(50-94) 137/94     Weight:  (Need bed weight)  Body mass index is 27.59 kg/m².     Physical Exam  Vitals reviewed.   Constitutional:       General: She is not in acute distress.     Appearance: She is ill-appearing.   HENT:      Head: Normocephalic and atraumatic.      Nose: Nose normal.      Mouth/Throat:      Mouth: Mucous membranes are moist.      Pharynx: Oropharynx is clear.   Eyes:      Extraocular Movements: Extraocular movements intact.      Pupils: Pupils are equal, round, and reactive to light.   Cardiovascular:      Rate and Rhythm: Normal rate and regular rhythm.      Heart sounds: Normal heart sounds.   Pulmonary:      Effort: Pulmonary effort is normal. No respiratory distress.      Breath sounds: Normal breath sounds. No wheezing or rales.   Abdominal:      General: Bowel sounds are normal. There is no distension.      Palpations: Abdomen is soft.      Tenderness: There is no abdominal tenderness. There is no guarding.   Musculoskeletal:         General: Normal range of motion.      Cervical back: Normal  range of motion and neck supple. Tenderness present.      Right lower leg: No edema.      Left lower leg: No edema.   Skin:     General: Skin is warm and dry.      Capillary Refill: Capillary refill takes less than 2 seconds.   Neurological:      General: No focal deficit present.      Mental Status: She is alert and oriented to person, place, and time.      Comments: Mild generalized weakness, ROSENBERG 5/5   Psychiatric:         Mood and Affect: Mood normal.         Behavior: Behavior normal.              CRANIAL NERVES     CN III, IV, VI   Pupils are equal, round, and reactive to light.       Significant Labs: All pertinent labs within the past 24 hours have been reviewed.  Recent Lab Results         05/08/24  1811        Albumin 3.5       ALP 91       ALT 13       Anion Gap 11       AST 16       Baso # 0.07       Basophil % 0.7       BILIRUBIN TOTAL 0.3  Comment: For infants and newborns, interpretation of results should be based  on gestational age, weight and in agreement with clinical  observations.    Premature Infant recommended reference ranges:  Up to 24 hours.............<8.0 mg/dL  Up to 48 hours............<12.0 mg/dL  3-5 days..................<15.0 mg/dL  6-29 days.................<15.0 mg/dL           Comment: Values of less than 100 pg/ml are consistent with non-CHF populations.       BUN 31       Calcium 9.9       Chloride 106       CO2 21       Creatinine 1.7       Differential Method Automated       eGFR 32       Eos # 0.2       Eos % 2.4       Glucose 93       Gran # (ANC) 7.3       Gran % 76.5       Hematocrit 29.9       Hemoglobin 9.6       Immature Grans (Abs) 0.06  Comment: Mild elevation in immature granulocytes is non specific and   can be seen in a variety of conditions including stress response,   acute inflammation, trauma and pregnancy. Correlation with other   laboratory and clinical findings is essential.         Immature Granulocytes 0.6       Lymph # 1.2       Lymph % 12.2        MCH 29.1       MCHC 32.1       MCV 91       Mono # 0.7       Mono % 7.6       MPV 9.5       nRBC 0       Platelet Estimate Appears normal       Platelet Count 394       Potassium 4.9       PROTEIN TOTAL 8.2       RBC 3.30       RDW 14.6       Sodium 138       Troponin I 0.011  Comment: The reference interval for Troponin I represents the 99th percentile   cutoff   for our facility and is consistent with 3rd generation assay   performance.         WBC 9.58             EKG reviewed, normal sinus rhythm with sinus arrhythmia, heart rate 72, T-wave inversion in lateral leads noted    Significant Imaging: I have reviewed all pertinent imaging results/findings within the past 24 hours.    Chest x-ray reviewed--mild perihilar interstitial hazy opacity suggestive of element of pulmonary edema

## 2024-05-09 NOTE — ASSESSMENT & PLAN NOTE
Acute worsening of what sounds like chronic chest pain  She was recently started on Entresto, Ranexa, and Brilinta after stents placed to LAD, left circumflex and left main at [facility]  Morphine ordered PRN  Trending troponins, initial troponin normal  Cardiac monitoring ordered  Cardiology consult   Continue ASA, Brilinta, BB, Ranexa  Will consider addition of nitrates

## 2024-05-09 NOTE — ASSESSMENT & PLAN NOTE
Chronic, controlled. Latest blood pressure and vitals reviewed-     Temp:  [98.1 °F (36.7 °C)-98.3 °F (36.8 °C)]   Pulse:  [67-74]   Resp:  [13-30]   BP: ()/(50-94)   SpO2:  [95 %-100 %] .   Home meds for hypertension were reviewed and noted below.   Hypertension Medications               amLODIPine (NORVASC) 5 MG tablet TAKE 1 TABLET BY MOUTH ONCE DAILY    metoprolol succinate (TOPROL XL) 50 MG 24 hr tablet 1 and 1/2 in the AM.  1 and 1/2 at night.    nitroGLYCERIN (NITROSTAT) 0.4 MG SL tablet USE AS DIRECTED    chlorthalidone (HYGROTEN) 25 MG Tab Take 25 mg by mouth every morning.    cloNIDine (CATAPRES) 0.2 MG tablet Take 1 tablet (0.2 mg total) by mouth as needed.    furosemide (LASIX) 20 MG tablet Take 20 mg by mouth.    hydrALAZINE (APRESOLINE) 50 MG tablet Take 50 mg by mouth 3 (three) times daily.    isosorbide mononitrate (IMDUR) 30 MG 24 hr tablet Take 30 mg by mouth every morning.    losartan (COZAAR) 50 MG tablet TAKE 1 TABLET BY MOUTH EVERY DAY    sacubitriL-valsartan (ENTRESTO) 24-26 mg per tablet Take 1 tablet by mouth 2 (two) times daily.         -- Home medication list needs to be updated as several of these medications were discontinued recently including chlorthalidone, hydralazine, and Imdur    While in the hospital, will manage blood pressure as follows; continue home medications which were recommended at discharge several weeks ago    Will utilize p.r.n. blood pressure medication only if patient's blood pressure greater than 180/110 and she develops symptoms such as worsening chest pain or shortness of breath.

## 2024-05-09 NOTE — PROGRESS NOTES
[facility] Medicine  Progress Note    Patient Name: Paola Wood  MRN: [id number]  Patient Class: OP- Observation   Admission Date: 5/8/2024  Length of Stay: 0 days  Attending Physician: Sapphire Stroud MD  Primary Care Provider: Grace Duron MD        Subjective:     Principal Problem:Acute chest pain        HPI:  Patient is a 73-year-old female with past medical history significant for CAD status post CABG about 2 years ago and multiple coronary stents in the past -- most recently at [place] on 4/16/2024 with stents placed to LAD, left circumflex, and left main, diastolic congestive heart failure, hypertension, asthma, allergic rhinitis, chronic back pain, chronic dyspnea, TIA, GERD,  hyperlipidemia with statin intolerance, and chronic anemia who presented to ED with complaint of severe chest pain, rated 10/10.  She reports that she started feeling short of breath about 3 days ago and then started with upper back pain yesterday which radiate to through to her upper left chest and goes up into her left neck.  She states that the pain was intermittent, however now has become constant.   Initial vital signs in ED blood pressure 98/50, heart rate 69, afebrile, saturating 100% on room air.   Blood pressure did improve to the 130s systolic.   EKG showed normal sinus rhythm with sinus arrhythmia and nonspecific T-wave abnormality with T-wave inversion evident in lateral leads, no STEMI.  Chest x-ray showed mild perihilar interstitial hazy opacity suggestive of pulmonary edema.  Lab workup significant for hemoglobin 9.6, hematocrit 29.9, CO2 21, BUN 31, creatinine 1.7, , troponin 0.011.  ED nurses present in the room giving her morphine and Zofran at this time for her complaint of chest pain rated 9.5/10 currently. she was also given IV Lasix 60 mg while in ED. hospital medicine is consulted for admission due to chest pain.    Overview/Hospital Course:  74 y/o  female presents to ED with complaint of severe chest pain, rated 10/10.   EKG showed normal sinus rhythm with sinus arrhythmia and nonspecific T-wave abnormality with T-wave inversion evident in lateral leads, no STEMI.   Chest x-ray: showed mild perihilar interstitial hazy opacity suggestive of pulmonary edema.   Serial troponin negative   Cardiology consulted  ECHO: EF 60%, Indeterminate diastolic function and normal systolic function. PA pressure 21mmHg. Mild aortic valve sclerosis.   BP labile will continue BB as tolerated but hold Entresto given bumped creatinine. Addition dose of IV lasix 40mg given today and evaluate response in a.m. as patient continues to report severe CP and SOB.       Interval History: f/u chest pain. Patient awake, alert and sitting on side of bed. Patient reports severe 10/10 chest pain that has progressively gotten worse over the last few days. Patient reports she is always SOB but it is made worse with exertion. Patient denies nausea and vomiting. Patients bp has been hypotensive with episodes of bradycardia, pain meds held while BP is low. Cardiology consulted, recommends additional dose of Lasix IV today and evaluate for response in a.m.     Review of Systems   Constitutional:  Positive for fatigue.   Respiratory:  Positive for chest tightness and shortness of breath.    Cardiovascular:  Positive for chest pain.   Musculoskeletal:  Positive for back pain and neck pain.     Objective:     Vital Signs (Most Recent):  Temp: 98.1 °F (36.7 °C) (05/09/24 1211)  Pulse: 62 (05/09/24 1211)  Resp: 19 (05/09/24 1211)  BP: 117/61 (05/09/24 1211)  SpO2: 99 % (05/09/24 1211) Vital Signs (24h Range):  Temp:  [97.8 °F (36.6 °C)-98.3 °F (36.8 °C)] 98.1 °F (36.7 °C)  Pulse:  [50-74] 62  Resp:  [13-30] 19  SpO2:  [95 %-100 %] 99 %  BP: ()/(45-94) 117/61     Weight: 68.9 kg (152 lb)  Body mass index is 26.93 kg/m².    Intake/Output Summary (Last 24 hours) at 5/9/2024 0374  Last data filed at  5/9/2024 1450  Gross per 24 hour   Intake 240 ml   Output 11 ml   Net 229 ml         Physical Exam  Vitals and nursing note reviewed.   Constitutional:       General: She is not in acute distress.     Appearance: She is ill-appearing.   HENT:      Mouth/Throat:      Mouth: Mucous membranes are moist.      Pharynx: Oropharynx is clear.   Eyes:      Pupils: Pupils are equal, round, and reactive to light.   Cardiovascular:      Rate and Rhythm: Normal rate and regular rhythm.      Heart sounds: No murmur heard.  Pulmonary:      Effort: Pulmonary effort is normal.      Breath sounds: No wheezing.      Comments: Mild coarseness heard at lung bases  Abdominal:      General: Bowel sounds are normal. There is no distension.      Palpations: Abdomen is soft.      Tenderness: There is no abdominal tenderness.   Musculoskeletal:         General: Normal range of motion.   Skin:     General: Skin is warm and dry.   Neurological:      General: No focal deficit present.      Mental Status: She is alert and oriented to person, place, and time.             Significant Labs: All pertinent labs within the past 24 hours have been reviewed.  Recent Lab Results  (Last 5 results in the past 24 hours)        05/09/24  1206   05/09/24  0540   05/09/24  0539   05/09/24  0216   05/08/24  2246        Albumin   3.2             ALP   83             ALT   11             Anion Gap   11             Ao root annulus 2.64               Ascending aorta 2.47               Ao peak consuelo 1.43               Ao VTI 24.20               AST   13             AV valve area 2.68               YANIRA by Velocity Ratio 2.07               AV mean gradient 5               AV index (prosthetic) 0.89               AV peak gradient 8               AV Velocity Ratio 0.69               Baso #     0.08           Basophil %     0.9           BILIRUBIN TOTAL   0.2  Comment: For infants and newborns, interpretation of results should be based  on gestational age, weight and in  agreement with clinical  observations.    Premature Infant recommended reference ranges:  Up to 24 hours.............<8.0 mg/dL  Up to 48 hours............<12.0 mg/dL  3-5 days..................<15.0 mg/dL  6-29 days.................<15.0 mg/dL               BSA 1.75               BUN   34             Calcium   9.2             Chloride   105             CO2   23             Creatinine   1.7             Left Ventricle Relative Wall Thickness 0.54               Differential Method     Automated           E/A ratio 0.94               E/E' ratio 14.50               eGFR   32             EF 60               Eos #     0.3           Eos %     2.8           E wave deceleration time 215.38               FS 36               Glucose   96             Gran # (ANC)     6.0           Gran %     66.0           Hematocrit     31.0           Hemoglobin     9.8           Immature Grans (Abs)     0.05  Comment: Mild elevation in immature granulocytes is non specific and   can be seen in a variety of conditions including stress response,   acute inflammation, trauma and pregnancy. Correlation with other   laboratory and clinical findings is essential.             Immature Granulocytes     0.5           IVC diameter 1.41               IVRT 65.65               IVSd 1.09               LA WIDTH 3.0               Left Atrium Major Axis 4.72               Left Atrium Minor Axis 4.67               LA size 3.17               LA volume 37.95               LA vol index 22.1               LVOT area 3.0               LV LATERAL E/E' RATIO 12.43               LV SEPTAL E/E' RATIO 17.40               LV EDV BP 66.74               LV Diastolic Volume Index 38.80               LVIDd 3.92               LVIDs 2.50               LV mass 136.56               LV Mass Index 79               Left Ventricular Outflow Tract Mean Gradient 2.77               Left Ventricular Outflow Tract Mean Velocity 0.83               LVOT diameter 1.96               LVOT peak  bj 0.98               LVOT stroke volume 64.84               LVOT peak VTI 21.50               LV ESV BP 22.35               LV Systolic Volume Index 13.0               Lymph #     1.8           Lymph %     19.7           Magnesium    2.4             MCH     28.9           MCHC     31.6           MCV     91           Mean e' 0.06               Mono #     0.9           Mono %     10.1           MPV     9.3           Mr max bj 3.28               MV valve area p 1/2 method 3.52               MV Peak A Bj 0.93               MV Peak E Bj 0.87               MV stenosis pressure 1/2 time 62.46               nRBC     0           Platelet Count     338           Potassium   4.3             PROTEIN TOTAL   7.4             PV mean gradient 1               Posterior Wall 1.06               RA Major Axis 3.57               Est. RA pres 3               RA Width 2.6               RBC     3.39           RDW     14.6           RV TB RVSP 5               RVOT peak bj 0.66               RVOT peak VTI 17.2               Sodium   139             STJ 2.57               TAPSE 1.75               TDI SEPTAL 0.05               TDI LATERAL 0.07               Triscuspid Valve Regurgitation Peak Gradient 18               TR Max Bj 2.14               Troponin I   0.019  Comment: The reference interval for Troponin I represents the 99th percentile   cutoff   for our facility and is consistent with 3rd generation assay   performance.       0.025  Comment: The reference interval for Troponin I represents the 99th percentile   cutoff   for our facility and is consistent with 3rd generation assay   performance.     0.022  Comment: The reference interval for Troponin I represents the 99th percentile   cutoff   for our facility and is consistent with 3rd generation assay   performance.         TV mean gradient 14               TV resting pulmonary artery pressure 21               WBC     9.13           ZLVIDD -1.96               ZLVIDS -1.32                                       Significant Imaging: I have reviewed all pertinent imaging results/findings within the past 24 hours.    Assessment/Plan:      * Acute chest pain  Acute worsening of what sounds like chronic chest pain  She was recently started on Entresto, Ranexa, and Brilinta after stents placed to LAD, left circumflex and left main at [place]  Morphine ordered PRN  Trending troponins, initial troponin normal  Cardiac monitoring ordered  Cardiology consult   Continue ASA, Brilinta, BB, Ranexa  Will consider addition of nitrates    LINN (dyspnea on exertion)  Assess response to IV diuresis     Acute on chronic diastolic congestive heart failure  Patient is identified as having Diastolic (HFpEF) heart failure that is Acute on chronic. CHF is currently controlled. Latest ECHO performed and demonstrates- No results found for this or any previous visit.  . Continue Beta Blocker and Furosemide and monitor clinical status closely. Monitor on telemetry. Patient is off CHF pathway.  Monitor strict Is&Os and daily weights.  Place on fluid restriction of 1.5 L. Cardiology has been consulted. Continue to stress to patient importance of self efficacy and  on diet for CHF. Last BNP reviewed- and noted below   Recent Labs   Lab 05/08/24  1811   *       Monitor fluid/volume status  IV Lasix given per ED due to pulmonary edema noted on CXR  Recent heart catheterization -- LV EF 60%  BNP mildly elevated and CXR concerning for pulmonary edema  Additional dose of IV Lasix today  Continue BB as tolerated  Hold Entresto given LINDSAY  TTE with normal EF    Acute pulmonary edema  Lasix 60 mg IV given per ED  Monitor respiratory status and CXR  Additional dose in Lasix 40mg IV ordered for 5/9    LINDSAY (acute kidney injury)  Baseline creatinine appears to be 0.81 to 1.25  Monitor renal function closely, she was given Lasix in ED due to pulmonary edema  Entresto held     Chronic pain  Continue PRN Percocet -- she  sees pain management      Generalized anxiety disorder  Not currently on medication, she does not appear particularly anxious at this time, however it could be contributing to her symptoms      Hypertension  Chronic, controlled. Latest blood pressure and vitals reviewed-     Temp:  [97.8 °F (36.6 °C)-98.3 °F (36.8 °C)]   Pulse:  [50-74]   Resp:  [13-30]   BP: ()/(45-94)   SpO2:  [95 %-100 %] .   Home meds for hypertension were reviewed and noted below.   Hypertension Medications               amLODIPine (NORVASC) 5 MG tablet TAKE 1 TABLET BY MOUTH ONCE DAILY    metoprolol succinate (TOPROL XL) 50 MG 24 hr tablet 1 and 1/2 in the AM.  1 and 1/2 at night.    nitroGLYCERIN (NITROSTAT) 0.4 MG SL tablet USE AS DIRECTED    chlorthalidone (HYGROTEN) 25 MG Tab Take 25 mg by mouth every morning.    cloNIDine (CATAPRES) 0.2 MG tablet Take 1 tablet (0.2 mg total) by mouth as needed.    furosemide (LASIX) 20 MG tablet Take 20 mg by mouth.    hydrALAZINE (APRESOLINE) 50 MG tablet Take 50 mg by mouth 3 (three) times daily.    isosorbide mononitrate (IMDUR) 30 MG 24 hr tablet Take 30 mg by mouth every morning.    losartan (COZAAR) 50 MG tablet TAKE 1 TABLET BY MOUTH EVERY DAY    sacubitriL-valsartan (ENTRESTO) 24-26 mg per tablet Take 1 tablet by mouth 2 (two) times daily.         -- Home medication list needs to be updated as several of these medications were discontinued recently including chlorthalidone, hydralazine, and Imdur      While in the hospital, will manage blood pressure as follows; continue home medications which were recommended at discharge several weeks ago    Will utilize p.r.n. blood pressure medication only if patient's blood pressure greater than 180/110 and she develops symptoms such as worsening chest pain or shortness of breath.    -BP labile  -Continue BB as tolerated  -Hold Entresto given bumped creatinine    Generalized convulsive epilepsy  No recent seizures, has not been on  antiepileptics      Hyperlipidemia  Intolerant of statins        Acid reflux  Continue PPI      Coronary artery disease of bypass graft of native heart with stable angina pectoris  Patient with known CAD s/p stent placement and CABG, which is uncontrolled Will continue  Brilinta, Ranexa  and monitor for S/Sx of angina/ACS. Continue to monitor on telemetry.     Patient had recent heart catheterization and stents placed to LAD, left circumflex and left main on April 16, 2024 at [place]  At that time she was taken off of Plavix and started on Brilinta as well as Ranexa and Entresto  Consult cardiology  Continue home medications - Brilinta, Ranexa  Entresto held due to elevated Crit 1.7  Cardiac monitoring  PRN morphine ordered  She is also complaining of pain radiating into left neck - -will try muscle relaxant as well  Not on statin due to statin intolerance    Anemia in chronic illness  Patient's anemia is currently controlled. Has not received any PRBCs to date.   Current CBC reviewed-   Lab Results   Component Value Date    HGB 9.8 (L) 05/09/2024    HCT 31.0 (L) 05/09/2024     Monitor serial CBC and transfuse if patient becomes hemodynamically unstable, symptomatic or H/H drops below 7/21.      VTE Risk Mitigation (From admission, onward)           Ordered     IP VTE HIGH RISK PATIENT  Once         05/08/24 1957     Place sequential compression device  Until discontinued         05/08/24 1957                    Discharge Planning   SHAD:      Code Status: Full Code   Is the patient medically ready for discharge?:     Reason for patient still in hospital (select all that apply): Patient trending condition, Treatment, and Consult recommendations  Discharge Plan A: Home with family        Inpatient Upgrade Note    Paola Wood has warranted treatment spanning two or more midnights of hospital level care for the management of chest pain. She continues to require IV diuresis, monitoring of vital signs,  medication adjustments, and further evaluation by consultants. Her condition is also complicated by the following comorbidities: Coronary Artery Disease and Hypertension.             Bebe Martinez NP  Department of Hospital Medicine   [facility] ([facility])

## 2024-05-09 NOTE — H&P
[facility] Medicine  History & Physical    Patient Name: Paola Wood  MRN: [id number]  Patient Class: OP- Observation  Admission Date: 5/8/2024  Attending Physician: Huma Solorio MD   Primary Care Provider: Grace Duron MD         Patient information was obtained from patient, relative(s), past medical records, and ER records.     Subjective:     Principal Problem:Acute chest pain    Chief Complaint:   Chief Complaint   Patient presents with    Chest Pain     Patient presents to ED with c/o chest pain and SOB x 3 days. Patient was recently in [place] for stent placement recently.        HPI: Patient is a 73-year-old female with past medical history significant for CAD status post CABG about 2 years ago and multiple coronary stents in the past -- most recently at [place] on 4/16/2024 with stents placed to LAD, left circumflex, and left main, diastolic congestive heart failure, hypertension, asthma, allergic rhinitis, chronic back pain, chronic dyspnea, TIA, GERD,  hyperlipidemia with statin intolerance, and chronic anemia who presented to ED with complaint of severe chest pain, rated 10/10.  She reports that she started feeling short of breath about 3 days ago and then started with upper back pain yesterday which radiate to through to her upper left chest and goes up into her left neck.  She states that the pain was intermittent, however now has become constant.   Initial vital signs in ED blood pressure 98/50, heart rate 69, afebrile, saturating 100% on room air.   Blood pressure did improve to the 130s systolic.   EKG showed normal sinus rhythm with sinus arrhythmia and nonspecific T-wave abnormality with T-wave inversion evident in lateral leads, no STEMI.  Chest x-ray showed mild perihilar interstitial hazy opacity suggestive of pulmonary edema.  Lab workup significant for hemoglobin 9.6, hematocrit 29.9, CO2 21, BUN 31, creatinine 1.7, , troponin  0.011.  ED nurses present in the room giving her morphine and Zofran at this time for her complaint of chest pain rated 9.5/10 currently. she was also given IV Lasix 60 mg while in ED. hospital medicine is consulted for admission due to chest pain.    Past Medical History:   Diagnosis Date    Allergy     AR (allergic rhinitis)     Asthma     CAD (coronary artery disease)     CHF (congestive heart failure)     Chronic pain     neck/back/arm/knee    GERD (gastroesophageal reflux disease)     Hypertension     Seizures     SOB (shortness of breath)     TIA (transient ischemic attack)        Past Surgical History:   Procedure Laterality Date    CATARACT EXTRACTION      CORONARY ANGIOPLASTY WITH STENT PLACEMENT      CORONARY ARTERY BYPASS GRAFT      HYSTERECTOMY  1983    partial (Ovaries removed 1998)    OOPHORECTOMY  1998       Review of patient's allergies indicates:   Allergen Reactions    Ace inhibitors Swelling     Other reaction(s): tongue swelling  Other reaction(s): Other (See Comments)  Other reaction(s): tongue swelling      Bacitracin     Ezetimibe     Lisinopril      Other reaction(s): Not available    Neomycin     Niacin     Polymyxin b     Atorvastatin Rash    Rosuvastatin Nausea And Vomiting    Statins-hmg-coa reductase inhibitors Nausea And Vomiting     Other reaction(s): Not available       Current Facility-Administered Medications on File Prior to Encounter   Medication    [DISCONTINUED] GENERIC EXTERNAL MEDICATION    [DISCONTINUED] GENERIC EXTERNAL MEDICATION     Current Outpatient Medications on File Prior to Encounter   Medication Sig    amitriptyline (ELAVIL) 150 MG Tab Take 150 mg by mouth every evening.    amLODIPine (NORVASC) 5 MG tablet TAKE 1 TABLET BY MOUTH ONCE DAILY    aspirin (ECOTRIN) 81 MG EC tablet Take 81 mg by mouth once daily.    metoprolol succinate (TOPROL XL) 50 MG 24 hr tablet 1 and 1/2 in the AM.  1 and 1/2 at night.    nitroGLYCERIN (NITROSTAT) 0.4 MG SL tablet USE AS DIRECTED     oxyCODONE-acetaminophen (PERCOCET)  mg per tablet Take 1 tablet by mouth every 4 (four) hours as needed.    pantoprazole (PROTONIX) 40 MG tablet Take 1 tablet (40 mg total) by mouth once daily. PROTONIX 40 MG TBEC    alirocumab (PRALUENT PEN) 75 mg/mL PnIj Inject 1 mL (75 mg total) into the skin every 14 (fourteen) days.    chlorthalidone (HYGROTEN) 25 MG Tab Take 25 mg by mouth every morning.    cloNIDine (CATAPRES) 0.2 MG tablet Take 1 tablet (0.2 mg total) by mouth as needed.    clopidogrel (PLAVIX) 75 mg tablet Take 75 mg by mouth once daily.    cyproheptadine (PERIACTIN) 4 mg tablet Take 4 mg by mouth 3 (three) times daily as needed.    folic acid (FOLVITE) 1 MG tablet Take 1 tablet (1 mg total) by mouth once daily. FOLIC ACID 1 MG TABS    furosemide (LASIX) 20 MG tablet Take 20 mg by mouth.    gemfibroziL (LOPID) 600 MG tablet Take 1 tablet (600 mg total) by mouth in the morning and 1 tablet (600 mg total) in the evening. Take before meals.    hydrALAZINE (APRESOLINE) 50 MG tablet Take 50 mg by mouth 3 (three) times daily.    isosorbide mononitrate (IMDUR) 30 MG 24 hr tablet Take 30 mg by mouth every morning.    losartan (COZAAR) 50 MG tablet TAKE 1 TABLET BY MOUTH EVERY DAY    potassium chloride SA (K-DUR,KLOR-CON) 20 MEQ tablet Take 20 mEq by mouth once daily.    ranolazine (RANEXA) 500 MG Tb12 Take 500 mg by mouth 2 (two) times daily.    ranolazine (RANEXA) 500 MG Tb12 Take 500 mg by mouth 2 (two) times daily.    sacubitriL-valsartan (ENTRESTO) 24-26 mg per tablet Take 1 tablet by mouth 2 (two) times daily.    ticagrelor (BRILINTA) 90 mg tablet Take 90 mg by mouth 2 (two) times daily.     Family History       Problem Relation (Age of Onset)    Breast cancer Sister (53)    Heart disease Mother, Father    No Known Problems Maternal Grandmother, Maternal Grandfather, Paternal Grandmother, Paternal Grandfather          Tobacco Use    Smoking status: Never    Smokeless tobacco: Never   Substance and Sexual  Activity    Alcohol use: No    Drug use: No    Sexual activity: Not Currently     Review of Systems   Constitutional:  Positive for fatigue.   HENT: Negative.     Eyes: Negative.    Respiratory:  Positive for cough and shortness of breath. Negative for wheezing.    Cardiovascular:  Positive for chest pain. Negative for palpitations and leg swelling.   Gastrointestinal:  Negative for abdominal pain, constipation, diarrhea, nausea and vomiting.   Endocrine: Negative.    Genitourinary: Negative.    Musculoskeletal:  Positive for back pain and neck pain.        Upper back pain and left sided neck pain   Skin: Negative.    Neurological:  Negative for dizziness, syncope, weakness, light-headedness, numbness and headaches.   Psychiatric/Behavioral: Negative.     All other systems reviewed and are negative.    Objective:     Vital Signs (Most Recent):  Temp: 98.3 °F (36.8 °C) (05/08/24 1720)  Pulse: 67 (05/08/24 2003)  Resp: 13 (05/08/24 2003)  BP: (!) 137/94 (05/08/24 2003)  SpO2: 100 % (05/08/24 2003) Vital Signs (24h Range):  Temp:  [98.3 °F (36.8 °C)] 98.3 °F (36.8 °C)  Pulse:  [67-74] 67  Resp:  [13-30] 13  SpO2:  [95 %-100 %] 100 %  BP: ()/(50-94) 137/94     Weight:  (Need bed weight)  Body mass index is 27.59 kg/m².     Physical Exam  Vitals reviewed.   Constitutional:       General: She is not in acute distress.     Appearance: She is ill-appearing.   HENT:      Head: Normocephalic and atraumatic.      Nose: Nose normal.      Mouth/Throat:      Mouth: Mucous membranes are moist.      Pharynx: Oropharynx is clear.   Eyes:      Extraocular Movements: Extraocular movements intact.      Pupils: Pupils are equal, round, and reactive to light.   Cardiovascular:      Rate and Rhythm: Normal rate and regular rhythm.      Heart sounds: Normal heart sounds.   Pulmonary:      Effort: Pulmonary effort is normal. No respiratory distress.      Breath sounds: Normal breath sounds. No wheezing or rales.   Abdominal:       General: Bowel sounds are normal. There is no distension.      Palpations: Abdomen is soft.      Tenderness: There is no abdominal tenderness. There is no guarding.   Musculoskeletal:         General: Normal range of motion.      Cervical back: Normal range of motion and neck supple. Tenderness present.      Right lower leg: No edema.      Left lower leg: No edema.   Skin:     General: Skin is warm and dry.      Capillary Refill: Capillary refill takes less than 2 seconds.   Neurological:      General: No focal deficit present.      Mental Status: She is alert and oriented to person, place, and time.      Comments: Mild generalized weakness, ROSENBERG 5/5   Psychiatric:         Mood and Affect: Mood normal.         Behavior: Behavior normal.              CRANIAL NERVES     CN III, IV, VI   Pupils are equal, round, and reactive to light.       Significant Labs: All pertinent labs within the past 24 hours have been reviewed.  Recent Lab Results         05/08/24  1811        Albumin 3.5       ALP 91       ALT 13       Anion Gap 11       AST 16       Baso # 0.07       Basophil % 0.7       BILIRUBIN TOTAL 0.3  Comment: For infants and newborns, interpretation of results should be based  on gestational age, weight and in agreement with clinical  observations.    Premature Infant recommended reference ranges:  Up to 24 hours.............<8.0 mg/dL  Up to 48 hours............<12.0 mg/dL  3-5 days..................<15.0 mg/dL  6-29 days.................<15.0 mg/dL           Comment: Values of less than 100 pg/ml are consistent with non-CHF populations.       BUN 31       Calcium 9.9       Chloride 106       CO2 21       Creatinine 1.7       Differential Method Automated       eGFR 32       Eos # 0.2       Eos % 2.4       Glucose 93       Gran # (ANC) 7.3       Gran % 76.5       Hematocrit 29.9       Hemoglobin 9.6       Immature Grans (Abs) 0.06  Comment: Mild elevation in immature granulocytes is non specific and   can be  seen in a variety of conditions including stress response,   acute inflammation, trauma and pregnancy. Correlation with other   laboratory and clinical findings is essential.         Immature Granulocytes 0.6       Lymph # 1.2       Lymph % 12.2       MCH 29.1       MCHC 32.1       MCV 91       Mono # 0.7       Mono % 7.6       MPV 9.5       nRBC 0       Platelet Estimate Appears normal       Platelet Count 394       Potassium 4.9       PROTEIN TOTAL 8.2       RBC 3.30       RDW 14.6       Sodium 138       Troponin I 0.011  Comment: The reference interval for Troponin I represents the 99th percentile   cutoff   for our facility and is consistent with 3rd generation assay   performance.         WBC 9.58             EKG reviewed, normal sinus rhythm with sinus arrhythmia, heart rate 72, T-wave inversion in lateral leads noted    Significant Imaging: I have reviewed all pertinent imaging results/findings within the past 24 hours.    Chest x-ray reviewed--mild perihilar interstitial hazy opacity suggestive of element of pulmonary edema  Assessment/Plan:     * Acute chest pain  Acute worsening of what sounds like chronic chest pain  She was recently started on Entresto, Ranexa, and Brilinta after stents placed to LAD, left circumflex and left main at [place]  Morphine ordered PRN  Trending troponins, initial troponin normal  Cardiac monitoring ordered  Cardiology consult requested      Coronary artery disease of bypass graft of native heart with stable angina pectoris  Patient with known CAD s/p stent placement and CABG, which is uncontrolled Will continue  Brilinta, Ranexa  and monitor for S/Sx of angina/ACS. Continue to monitor on telemetry.     Patient had recent heart catheterization and stents placed to LAD, left circumflex and left main on April 16, 2024 at [place]  At that time she was taken off of Plavix and started on Brilinta as well as Ranexa and Entresto  Consult cardiology  Continue home medications -  Brilinta, Ranexa, and Entresto  Cardiac monitoring  PRN morphine ordered  She is also complaining of pain radiating into left neck - -will try muscle relaxant as well  Not on statin due to statin intolerance    Acute on chronic diastolic congestive heart failure  Patient is identified as having Diastolic (HFpEF) heart failure that is Acute on chronic. CHF is currently controlled. Latest ECHO performed and demonstrates- No results found for this or any previous visit.  . Continue Beta Blocker and Furosemide and monitor clinical status closely. Monitor on telemetry. Patient is off CHF pathway.  Monitor strict Is&Os and daily weights.  Place on fluid restriction of 1.5 L. Cardiology has been consulted. Continue to stress to patient importance of self efficacy and  on diet for CHF. Last BNP reviewed- and noted below   Recent Labs   Lab 05/08/24  1811   *     Monitor fluid/volume status  IV Lasix given per ED due to pulmonary edema noted on CXR  Recent heart catheterization -- LV EF 60%    Acute pulmonary edema  Lasix 60 mg IV given per ED  Monitor respiratory status and CXR      LINDSAY (acute kidney injury)  Baseline creatinine appears to be 0.81 to 1.25  Monitor renal function closely, she was given Lasix in ED due to pulmonary edema    Chronic pain  Continue PRN Percocet -- she sees pain management      Generalized anxiety disorder  Not currently on medication, she does not appear particularly anxious at this time, however it could be contributing to her symptoms      Hypertension  Chronic, controlled. Latest blood pressure and vitals reviewed-     Temp:  [98.1 °F (36.7 °C)-98.3 °F (36.8 °C)]   Pulse:  [67-74]   Resp:  [13-30]   BP: ()/(50-94)   SpO2:  [95 %-100 %] .   Home meds for hypertension were reviewed and noted below.   Hypertension Medications               amLODIPine (NORVASC) 5 MG tablet TAKE 1 TABLET BY MOUTH ONCE DAILY    metoprolol succinate (TOPROL XL) 50 MG 24 hr tablet 1 and 1/2 in  the AM.  1 and 1/2 at night.    nitroGLYCERIN (NITROSTAT) 0.4 MG SL tablet USE AS DIRECTED    chlorthalidone (HYGROTEN) 25 MG Tab Take 25 mg by mouth every morning.    cloNIDine (CATAPRES) 0.2 MG tablet Take 1 tablet (0.2 mg total) by mouth as needed.    furosemide (LASIX) 20 MG tablet Take 20 mg by mouth.    hydrALAZINE (APRESOLINE) 50 MG tablet Take 50 mg by mouth 3 (three) times daily.    isosorbide mononitrate (IMDUR) 30 MG 24 hr tablet Take 30 mg by mouth every morning.    losartan (COZAAR) 50 MG tablet TAKE 1 TABLET BY MOUTH EVERY DAY    sacubitriL-valsartan (ENTRESTO) 24-26 mg per tablet Take 1 tablet by mouth 2 (two) times daily.         -- Home medication list needs to be updated as several of these medications were discontinued recently including chlorthalidone, hydralazine, and Imdur    While in the hospital, will manage blood pressure as follows; continue home medications which were recommended at discharge several weeks ago    Will utilize p.r.n. blood pressure medication only if patient's blood pressure greater than 180/110 and she develops symptoms such as worsening chest pain or shortness of breath.    Generalized convulsive epilepsy  No recent seizures, has not been on antiepileptics      Hyperlipidemia  Intolerant of statins        Acid reflux  Continue PPI      Anemia in chronic illness  Patient's anemia is currently controlled. Has not received any PRBCs to date.   Current CBC reviewed-   Lab Results   Component Value Date    HGB 9.6 (L) 05/08/2024    HCT 29.9 (L) 05/08/2024     Monitor serial CBC and transfuse if patient becomes hemodynamically unstable, symptomatic or H/H drops below 7/21.      VTE Risk Mitigation (From admission, onward)           Ordered     IP VTE HIGH RISK PATIENT  Once         05/08/24 1957     Place sequential compression device  Until discontinued         05/08/24 1957                    Ticagrelor resumed       On 05/08/2024, patient should be placed in hospital  observation services under my care in collaboration with Dr. Huma Solorio.           Adina Lyn NP  Department of Hospital Medicine  [facility] - Telemetry ([facility])

## 2024-05-09 NOTE — ASSESSMENT & PLAN NOTE
Acute worsening of what sounds like chronic chest pain  She was recently started on Entresto, Ranexa, and Brilinta after stents placed to LAD, left circumflex and left main at [place]  Morphine ordered PRN  Trending troponins, initial troponin normal  Cardiac monitoring ordered  Cardiology consult requested

## 2024-05-09 NOTE — HOSPITAL COURSE
74 y/o female presents to ED with complaint of severe chest pain, rated 10/10.   EKG showed normal sinus rhythm with sinus arrhythmia and nonspecific T-wave abnormality with T-wave inversion evident in lateral leads, no STEMI.   Chest x-ray: showed mild perihilar interstitial hazy opacity suggestive of pulmonary edema.   Serial troponin negative   Cardiology consulted  ECHO: EF 60%, Indeterminate diastolic function and normal systolic function. PA pressure 21mmHg. Mild aortic valve sclerosis.   BP labile will continue BB as tolerated but hold Entresto given bumped creatinine. Addition dose of IV lasix 40mg given today and evaluate response in a.m. as patient continues to report severe CP and SOB.   Additional dose of IV lasix 40mg dose ordered today 5/10, Cardiology recommends addition of  low dose nitropatch IF BP permits. Will continue to assess response to medication adjustments per cardiology recommendations. Patient reports improvement in Chest pain states it is 9/10 now, will continue PRN pain medications as BP allows.   Cardiology ordered BID Lasix 40mg IV BID as the patient is continuing to report SOB.   Patient reported one episode of severe chest pain 5/11, PRN morphine given, STAT EKG and troponin ordered. Cardiology Dr. Carr aware and reviewed troponin and EKG. Patient reported improvement with morphine.     Patient BUN 31 & Crit 1.9 elevated from previous, held IV lasix. Consulted cardiology, Dr. Carr recommends 250ml NS bolus and will switch the brilinta to plavix as patient continues to report SOB. Will continue to trend BUN & Crit with morning labs.   5/13 creatinine remains 1.9. physical/occupational therapy consulted. Anticipate discharge tomorrow.     5/14  Creatinine improved to 1.4, close to baseline. Denies chest pain or dyspnea. Homehealth physical/occupational therapy ordered on discharge to monitor blood pressure and creatinine due to holding blood pressure medication(s) on discharge.      No acute distress. No respiratory distress. Soft abdomen on palpation. Clear lungs on auscultation. AO3. Mood and affect within normal limits.     Patient seen and evaluated by me. Patient was determined to be suitable for discharge. Patient deemed stable for discharge to home with homehealth physical/occupational therapy and nurse practitioner to visit home program.

## 2024-05-09 NOTE — ASSESSMENT & PLAN NOTE
Patient's anemia is currently controlled. Has not received any PRBCs to date.   Current CBC reviewed-   Lab Results   Component Value Date    HGB 9.8 (L) 05/09/2024    HCT 31.0 (L) 05/09/2024     Monitor serial CBC and transfuse if patient becomes hemodynamically unstable, symptomatic or H/H drops below 7/21.

## 2024-05-09 NOTE — HPI
Patient is a 73-year-old female with past medical history significant for CAD status post CABG about 2 years ago and multiple coronary stents in the past -- most recently at [place] on 4/16/2024 with stents placed to LAD, left circumflex, and left main, diastolic congestive heart failure, hypertension, asthma, allergic rhinitis, chronic back pain, chronic dyspnea, TIA, GERD,  hyperlipidemia with statin intolerance, and chronic anemia who presented to ED with complaint of severe chest pain, rated 10/10.  She reports that she started feeling short of breath about 3 days ago and then started with upper back pain yesterday which radiate to through to her upper left chest and goes up into her left neck.  She states that the pain was intermittent, however now has become constant.   Initial vital signs in ED blood pressure 98/50, heart rate 69, afebrile, saturating 100% on room air.   Blood pressure did improve to the 130s systolic.   EKG showed normal sinus rhythm with sinus arrhythmia and nonspecific T-wave abnormality with T-wave inversion evident in lateral leads, no STEMI.  Chest x-ray showed mild perihilar interstitial hazy opacity suggestive of pulmonary edema.  Lab workup significant for hemoglobin 9.6, hematocrit 29.9, CO2 21, BUN 31, creatinine 1.7, , troponin 0.011.  ED nurses present in the room giving her morphine and Zofran at this time for her complaint of chest pain rated 9.5/10 currently. she was also given IV Lasix 60 mg while in ED. hospital medicine is consulted for admission due to chest pain.

## 2024-05-09 NOTE — ASSESSMENT & PLAN NOTE
-BNP mildly elevated and CXR concerning for pulmonary edema  -Additional dose of IV Lasix today  -Continue BB as tolerated  -Hold Entresto given LINDSAY  -TTE with normal EF

## 2024-05-09 NOTE — HPI
Ms. Wood is a 73 year old female patient whose current medical conditions include CAD s/p prior CABG with known occluded vein graft on the left and atretic LIMA, s/p recent PCI of LM, LCX, and LAD at [place] on 4/16/24 due to UA, diastolic CHF, HTN, asthma, TIA, hyperlipidemia, statin, intolerance, and chronic anemia who presented to [facility] ED yesterday evening due to worsening SOB over the past 2-3 days. Patient reported she became winded with minimal activity, even when making her bed. Associated symptoms included upper back pain that radiated forward toward her chest and up her neck. She denied any associated fever, chills, nausea, vomiting, or diaphoresis. Initial workup in ED revealed creatinine of 1.7 and BNP of 312. CXR concerning for pulmonary edema and patient was subsequently given a dose of IV Lsix and admitted for further evaluation and treatment. Cardiology consulted to assist with management. Patient seen and examined today, resting in bed. Feels about the same since admission. Endorses LINN/CP. States she initially felt well post recent PCI procedure and only began feeling this way 2-3 days ago. She reports compliance with her medications, on ASA and Brilinta as OP. Serial troponin negative. BP/HR variable. EKG reviewed, SR with non  T wave inversions lateral leads.

## 2024-05-09 NOTE — ASSESSMENT & PLAN NOTE
Lasix 60 mg IV given per ED  Monitor respiratory status and CXR  Additional dose in Lasix 40mg IV ordered for 5/9

## 2024-05-09 NOTE — PLAN OF CARE
Problem: Adult Inpatient Plan of Care  Goal: Plan of Care Review  Outcome: Progressing  Goal: Absence of Hospital-Acquired Illness or Injury  Outcome: Progressing  Goal: Readiness for Transition of Care  Outcome: Progressing     Problem: Acute Kidney Injury/Impairment  Goal: Fluid and Electrolyte Balance  Outcome: Progressing  Goal: Effective Renal Function  Outcome: Progressing     Problem: Chest Pain  Goal: Resolution of Chest Pain Symptoms  Outcome: Progressing

## 2024-05-09 NOTE — SUBJECTIVE & OBJECTIVE
Past Medical History:   Diagnosis Date    Allergy     AR (allergic rhinitis)     Asthma     CAD (coronary artery disease)     CHF (congestive heart failure)     Chronic pain     neck/back/arm/knee    GERD (gastroesophageal reflux disease)     Hypertension     Seizures     SOB (shortness of breath)     TIA (transient ischemic attack)        Past Surgical History:   Procedure Laterality Date    CATARACT EXTRACTION      CORONARY ANGIOPLASTY WITH STENT PLACEMENT      CORONARY ARTERY BYPASS GRAFT      HYSTERECTOMY  1983    partial (Ovaries removed 1998)    OOPHORECTOMY  1998       Review of patient's allergies indicates:   Allergen Reactions    Ace inhibitors Swelling     Other reaction(s): tongue swelling  Other reaction(s): Other (See Comments)  Other reaction(s): tongue swelling      Bacitracin     Ezetimibe     Lisinopril      Other reaction(s): Not available    Neomycin     Niacin     Polymyxin b     Atorvastatin Rash    Rosuvastatin Nausea And Vomiting    Statins-hmg-coa reductase inhibitors Nausea And Vomiting     Other reaction(s): Not available       Current Facility-Administered Medications on File Prior to Encounter   Medication    [DISCONTINUED] GENERIC EXTERNAL MEDICATION    [DISCONTINUED] GENERIC EXTERNAL MEDICATION     Current Outpatient Medications on File Prior to Encounter   Medication Sig    amitriptyline (ELAVIL) 150 MG Tab Take 150 mg by mouth every evening.    amLODIPine (NORVASC) 5 MG tablet TAKE 1 TABLET BY MOUTH ONCE DAILY    aspirin (ECOTRIN) 81 MG EC tablet Take 81 mg by mouth once daily.    metoprolol succinate (TOPROL XL) 50 MG 24 hr tablet 1 and 1/2 in the AM.  1 and 1/2 at night.    nitroGLYCERIN (NITROSTAT) 0.4 MG SL tablet USE AS DIRECTED    oxyCODONE-acetaminophen (PERCOCET)  mg per tablet Take 1 tablet by mouth every 4 (four) hours as needed.    pantoprazole (PROTONIX) 40 MG tablet Take 1 tablet (40 mg total) by mouth once daily. PROTONIX 40 MG TBEC    alirocumab (PRALUENT PEN) 75  mg/mL PnIj Inject 1 mL (75 mg total) into the skin every 14 (fourteen) days.    chlorthalidone (HYGROTEN) 25 MG Tab Take 25 mg by mouth every morning.    cloNIDine (CATAPRES) 0.2 MG tablet Take 1 tablet (0.2 mg total) by mouth as needed.    clopidogrel (PLAVIX) 75 mg tablet Take 75 mg by mouth once daily.    cyproheptadine (PERIACTIN) 4 mg tablet Take 4 mg by mouth 3 (three) times daily as needed.    folic acid (FOLVITE) 1 MG tablet Take 1 tablet (1 mg total) by mouth once daily. FOLIC ACID 1 MG TABS    furosemide (LASIX) 20 MG tablet Take 20 mg by mouth.    gemfibroziL (LOPID) 600 MG tablet Take 1 tablet (600 mg total) by mouth in the morning and 1 tablet (600 mg total) in the evening. Take before meals.    hydrALAZINE (APRESOLINE) 50 MG tablet Take 50 mg by mouth 3 (three) times daily.    isosorbide mononitrate (IMDUR) 30 MG 24 hr tablet Take 30 mg by mouth every morning.    losartan (COZAAR) 50 MG tablet TAKE 1 TABLET BY MOUTH EVERY DAY    potassium chloride SA (K-DUR,KLOR-CON) 20 MEQ tablet Take 20 mEq by mouth once daily.    ranolazine (RANEXA) 500 MG Tb12 Take 500 mg by mouth 2 (two) times daily.    ranolazine (RANEXA) 500 MG Tb12 Take 500 mg by mouth 2 (two) times daily.    sacubitriL-valsartan (ENTRESTO) 24-26 mg per tablet Take 1 tablet by mouth 2 (two) times daily.    ticagrelor (BRILINTA) 90 mg tablet Take 90 mg by mouth 2 (two) times daily.     Family History       Problem Relation (Age of Onset)    Breast cancer Sister (53)    Heart disease Mother, Father    No Known Problems Maternal Grandmother, Maternal Grandfather, Paternal Grandmother, Paternal Grandfather          Tobacco Use    Smoking status: Never    Smokeless tobacco: Never   Substance and Sexual Activity    Alcohol use: No    Drug use: No    Sexual activity: Not Currently     Review of Systems   Constitutional: Positive for malaise/fatigue.   HENT: Negative.     Eyes: Negative.    Cardiovascular:  Positive for chest pain and dyspnea on  exertion.   Respiratory:  Positive for shortness of breath.    Endocrine: Negative.    Hematologic/Lymphatic: Negative.    Skin: Negative.    Musculoskeletal:  Positive for back pain and neck pain.   Gastrointestinal: Negative.    Genitourinary: Negative.    Neurological: Negative.    Psychiatric/Behavioral: Negative.     Allergic/Immunologic: Negative.      Objective:     Vital Signs (Most Recent):  Temp: 98.1 °F (36.7 °C) (05/09/24 1211)  Pulse: 62 (05/09/24 1211)  Resp: 19 (05/09/24 1211)  BP: 117/61 (05/09/24 1211)  SpO2: 99 % (05/09/24 1211) Vital Signs (24h Range):  Temp:  [97.8 °F (36.6 °C)-98.3 °F (36.8 °C)] 98.1 °F (36.7 °C)  Pulse:  [50-74] 62  Resp:  [13-30] 19  SpO2:  [95 %-100 %] 99 %  BP: ()/(45-94) 117/61     Weight: 68.9 kg (152 lb)  Body mass index is 26.93 kg/m².    SpO2: 99 %         Intake/Output Summary (Last 24 hours) at 5/9/2024 1417  Last data filed at 5/9/2024 1022  Gross per 24 hour   Intake 240 ml   Output --   Net 240 ml       Lines/Drains/Airways       Peripheral Intravenous Line  Duration                  Peripheral IV - Single Lumen 05/08/24 1822 20 G Anterior;Left;Proximal Forearm <1 day                     Physical Exam  Vitals and nursing note reviewed.   Constitutional:       General: She is not in acute distress.     Appearance: Normal appearance. She is well-developed. She is not diaphoretic.   HENT:      Head: Normocephalic and atraumatic.   Eyes:      General:         Right eye: No discharge.         Left eye: No discharge.      Pupils: Pupils are equal, round, and reactive to light.   Cardiovascular:      Rate and Rhythm: Normal rate and regular rhythm.      Heart sounds: Normal heart sounds, S1 normal and S2 normal. No murmur heard.  Pulmonary:      Effort: Pulmonary effort is normal. No respiratory distress.      Comments: Faint coarseness at bases  Abdominal:      General: There is no distension.   Musculoskeletal:      Right lower leg: No edema.      Left lower leg:  No edema.   Skin:     General: Skin is warm and dry.      Findings: No erythema.   Neurological:      General: No focal deficit present.      Mental Status: She is alert and oriented to person, place, and time.   Psychiatric:         Mood and Affect: Mood normal.         Behavior: Behavior normal.          Significant Labs: CMP   Recent Labs   Lab 05/08/24  1811 05/09/24  0540    139   K 4.9 4.3    105   CO2 21* 23   GLU 93 96   BUN 31* 34*   CREATININE 1.7* 1.7*   CALCIUM 9.9 9.2   PROT 8.2 7.4   ALBUMIN 3.5 3.2*   BILITOT 0.3 0.2   ALKPHOS 91 83   AST 16 13   ALT 13 11   ANIONGAP 11 11   , CBC   Recent Labs   Lab 05/08/24  1811 05/09/24  0539   WBC 9.58 9.13   HGB 9.6* 9.8*   HCT 29.9* 31.0*    338   , Troponin   Recent Labs   Lab 05/08/24  2246 05/09/24  0216 05/09/24  0540   TROPONINI 0.022 0.025 0.019   , and All pertinent lab results from the last 24 hours have been reviewed.    Significant Imaging: Echocardiogram: Transthoracic echo (TTE) complete (Cupid Only):   Results for orders placed or performed during the hospital encounter of 05/08/24   Echo   Result Value Ref Range    BSA 1.75 m2    LA WIDTH 3.0 cm    RA Width 2.6 cm    LVOT stroke volume 64.84 cm3    LVIDd 3.92 3.5 - 6.0 cm    LV Systolic Volume 22.35 mL    LV Systolic Volume Index 13.0 mL/m2    LVIDs 2.50 2.1 - 4.0 cm    LV Diastolic Volume 66.74 mL    LV Diastolic Volume Index 38.80 mL/m2    IVS 1.09 0.6 - 1.1 cm    LVOT diameter 1.96 cm    LVOT area 3.0 cm2    FS 36 28 - 44 %    Left Ventricle Relative Wall Thickness 0.54 cm    Posterior Wall 1.06 0.6 - 1.1 cm    LV mass 136.56 g    LV Mass Index 79 g/m2    MV Peak E Bj 0.87 m/s    TDI LATERAL 0.07 m/s    TDI SEPTAL 0.05 m/s    E/E' ratio 14.50 m/s    MV Peak A Bj 0.93 m/s    TR Max Bj 2.14 m/s    E/A ratio 0.94     IVRT 65.65 msec    E wave deceleration time 215.38 msec    LV SEPTAL E/E' RATIO 17.40 m/s    LA Volume Index 22.1 mL/m2    LV LATERAL E/E' RATIO 12.43 m/s    LA  volume 37.95 cm3    LVOT peak consuelo 0.98 m/s    Left Ventricular Outflow Tract Mean Velocity 0.83 cm/s    Left Ventricular Outflow Tract Mean Gradient 2.77 mmHg    RVOT peak VTI 17.2 cm    TAPSE 1.75 cm    LA size 3.17 cm    Left Atrium Minor Axis 4.67 cm    Left Atrium Major Axis 4.72 cm    RA Major Axis 3.57 cm    AV mean gradient 5 mmHg    AV peak gradient 8 mmHg    Ao peak consuelo 1.43 m/s    Ao VTI 24.20 cm    LVOT peak VTI 21.50 cm    AV valve area 2.68 cm²    AV Velocity Ratio 0.69     AV index (prosthetic) 0.89     YANIRA by Velocity Ratio 2.07 cm²    Mr max consuelo 3.28 m/s    MV stenosis pressure 1/2 time 62.46 ms    MV valve area p 1/2 method 3.52 cm2    TV mean gradient 14 mmHg    Triscuspid Valve Regurgitation Peak Gradient 18 mmHg    PV mean gradient 1 mmHg    RVOT peak consuelo 0.66 m/s    Ao root annulus 2.64 cm    STJ 2.57 cm    Ascending aorta 2.47 cm    IVC diameter 1.41 cm    Mean e' 0.06 m/s    ZLVIDS -1.32     ZLVIDD -1.96     EF 60 %    TV resting pulmonary artery pressure 21 mmHg    RV TB RVSP 5 mmHg    Est. RA pres 3 mmHg    Narrative      Left Ventricle: The left ventricle is normal in size. Normal wall   thickness. There is concentric remodeling. Normal wall motion. Ejection   fraction by visual approximation is 60%. There is indeterminate diastolic   function.    Right Ventricle: Normal right ventricular cavity size. Wall thickness   is normal. Right ventricle wall motion  is normal. Systolic function is   normal.    Aortic Valve: The aortic valve is a trileaflet valve. There is mild   aortic valve sclerosis.    Pulmonary Artery: The estimated pulmonary artery systolic pressure is   21 mmHg.    IVC/SVC: Normal venous pressure at 3 mmHg.      and EKG: Reviewed

## 2024-05-09 NOTE — ASSESSMENT & PLAN NOTE
-Presents with SOB/LINN and stabbing heavy CP  -Recent complex PCI of LM, LAD, and LCX at [place] on 4/16/24  -Serial troponin negative  -TTE with normal EF  -Continue ASA, Brilinta, BB, Ranexa  -Will consider addition of nitrates

## 2024-05-09 NOTE — ASSESSMENT & PLAN NOTE
Chronic, controlled. Latest blood pressure and vitals reviewed-     Temp:  [97.8 °F (36.6 °C)-98.3 °F (36.8 °C)]   Pulse:  [50-74]   Resp:  [13-30]   BP: ()/(45-94)   SpO2:  [95 %-100 %] .   Home meds for hypertension were reviewed and noted below.   Hypertension Medications               amLODIPine (NORVASC) 5 MG tablet TAKE 1 TABLET BY MOUTH ONCE DAILY    metoprolol succinate (TOPROL XL) 50 MG 24 hr tablet 1 and 1/2 in the AM.  1 and 1/2 at night.    nitroGLYCERIN (NITROSTAT) 0.4 MG SL tablet USE AS DIRECTED    chlorthalidone (HYGROTEN) 25 MG Tab Take 25 mg by mouth every morning.    cloNIDine (CATAPRES) 0.2 MG tablet Take 1 tablet (0.2 mg total) by mouth as needed.    furosemide (LASIX) 20 MG tablet Take 20 mg by mouth.    hydrALAZINE (APRESOLINE) 50 MG tablet Take 50 mg by mouth 3 (three) times daily.    isosorbide mononitrate (IMDUR) 30 MG 24 hr tablet Take 30 mg by mouth every morning.    losartan (COZAAR) 50 MG tablet TAKE 1 TABLET BY MOUTH EVERY DAY    sacubitriL-valsartan (ENTRESTO) 24-26 mg per tablet Take 1 tablet by mouth 2 (two) times daily.         -- Home medication list needs to be updated as several of these medications were discontinued recently including chlorthalidone, hydralazine, and Imdur      While in the hospital, will manage blood pressure as follows; continue home medications which were recommended at discharge several weeks ago    Will utilize p.r.n. blood pressure medication only if patient's blood pressure greater than 180/110 and she develops symptoms such as worsening chest pain or shortness of breath.    -BP labile  -Continue BB as tolerated  -Hold Entresto given bumped creatinine

## 2024-05-09 NOTE — SUBJECTIVE & OBJECTIVE
Interval History: f/u chest pain. Patient awake, alert,     Review of Systems   Constitutional:  Positive for fatigue.   Respiratory:  Positive for chest tightness and shortness of breath.    Cardiovascular:  Positive for chest pain.   Musculoskeletal:  Positive for back pain and neck pain.     Objective:     Vital Signs (Most Recent):  Temp: 98.1 °F (36.7 °C) (05/09/24 1211)  Pulse: 62 (05/09/24 1211)  Resp: 19 (05/09/24 1211)  BP: 117/61 (05/09/24 1211)  SpO2: 99 % (05/09/24 1211) Vital Signs (24h Range):  Temp:  [97.8 °F (36.6 °C)-98.3 °F (36.8 °C)] 98.1 °F (36.7 °C)  Pulse:  [50-74] 62  Resp:  [13-30] 19  SpO2:  [95 %-100 %] 99 %  BP: ()/(45-94) 117/61     Weight: 68.9 kg (152 lb)  Body mass index is 26.93 kg/m².    Intake/Output Summary (Last 24 hours) at 5/9/2024 1529  Last data filed at 5/9/2024 1450  Gross per 24 hour   Intake 240 ml   Output 11 ml   Net 229 ml         Physical Exam  Vitals and nursing note reviewed.   Constitutional:       General: She is not in acute distress.     Appearance: She is ill-appearing.   HENT:      Mouth/Throat:      Mouth: Mucous membranes are moist.      Pharynx: Oropharynx is clear.   Eyes:      Pupils: Pupils are equal, round, and reactive to light.   Cardiovascular:      Rate and Rhythm: Normal rate and regular rhythm.      Heart sounds: No murmur heard.  Pulmonary:      Effort: Pulmonary effort is normal.      Breath sounds: No wheezing.      Comments: Mild coarseness heard at lung bases  Abdominal:      General: Bowel sounds are normal. There is no distension.      Palpations: Abdomen is soft.      Tenderness: There is no abdominal tenderness.   Musculoskeletal:         General: Normal range of motion.   Skin:     General: Skin is warm and dry.   Neurological:      General: No focal deficit present.      Mental Status: She is alert and oriented to person, place, and time.             Significant Labs: All pertinent labs within the past 24 hours have been  reviewed.  Recent Lab Results  (Last 5 results in the past 24 hours)        05/09/24  1206   05/09/24  0540   05/09/24  0539   05/09/24  0216   05/08/24  2246        Albumin   3.2             ALP   83             ALT   11             Anion Gap   11             Ao root annulus 2.64               Ascending aorta 2.47               Ao peak consuelo 1.43               Ao VTI 24.20               AST   13             AV valve area 2.68               YANIRA by Velocity Ratio 2.07               AV mean gradient 5               AV index (prosthetic) 0.89               AV peak gradient 8               AV Velocity Ratio 0.69               Baso #     0.08           Basophil %     0.9           BILIRUBIN TOTAL   0.2  Comment: For infants and newborns, interpretation of results should be based  on gestational age, weight and in agreement with clinical  observations.    Premature Infant recommended reference ranges:  Up to 24 hours.............<8.0 mg/dL  Up to 48 hours............<12.0 mg/dL  3-5 days..................<15.0 mg/dL  6-29 days.................<15.0 mg/dL               BSA 1.75               BUN   34             Calcium   9.2             Chloride   105             CO2   23             Creatinine   1.7             Left Ventricle Relative Wall Thickness 0.54               Differential Method     Automated           E/A ratio 0.94               E/E' ratio 14.50               eGFR   32             EF 60               Eos #     0.3           Eos %     2.8           E wave deceleration time 215.38               FS 36               Glucose   96             Gran # (ANC)     6.0           Gran %     66.0           Hematocrit     31.0           Hemoglobin     9.8           Immature Grans (Abs)     0.05  Comment: Mild elevation in immature granulocytes is non specific and   can be seen in a variety of conditions including stress response,   acute inflammation, trauma and pregnancy. Correlation with other   laboratory and clinical  findings is essential.             Immature Granulocytes     0.5           IVC diameter 1.41               IVRT 65.65               IVSd 1.09               LA WIDTH 3.0               Left Atrium Major Axis 4.72               Left Atrium Minor Axis 4.67               LA size 3.17               LA volume 37.95               LA vol index 22.1               LVOT area 3.0               LV LATERAL E/E' RATIO 12.43               LV SEPTAL E/E' RATIO 17.40               LV EDV BP 66.74               LV Diastolic Volume Index 38.80               LVIDd 3.92               LVIDs 2.50               LV mass 136.56               LV Mass Index 79               Left Ventricular Outflow Tract Mean Gradient 2.77               Left Ventricular Outflow Tract Mean Velocity 0.83               LVOT diameter 1.96               LVOT peak bj 0.98               LVOT stroke volume 64.84               LVOT peak VTI 21.50               LV ESV BP 22.35               LV Systolic Volume Index 13.0               Lymph #     1.8           Lymph %     19.7           Magnesium    2.4             MCH     28.9           MCHC     31.6           MCV     91           Mean e' 0.06               Mono #     0.9           Mono %     10.1           MPV     9.3           Mr max bj 3.28               MV valve area p 1/2 method 3.52               MV Peak A Bj 0.93               MV Peak E Bj 0.87               MV stenosis pressure 1/2 time 62.46               nRBC     0           Platelet Count     338           Potassium   4.3             PROTEIN TOTAL   7.4             PV mean gradient 1               Posterior Wall 1.06               RA Major Axis 3.57               Est. RA pres 3               RA Width 2.6               RBC     3.39           RDW     14.6           RV TB RVSP 5               RVOT peak bj 0.66               RVOT peak VTI 17.2               Sodium   139             STJ 2.57               TAPSE 1.75               TDI SEPTAL 0.05                TDI LATERAL 0.07               Triscuspid Valve Regurgitation Peak Gradient 18               TR Max Bj 2.14               Troponin I   0.019  Comment: The reference interval for Troponin I represents the 99th percentile   cutoff   for our facility and is consistent with 3rd generation assay   performance.       0.025  Comment: The reference interval for Troponin I represents the 99th percentile   cutoff   for our facility and is consistent with 3rd generation assay   performance.     0.022  Comment: The reference interval for Troponin I represents the 99th percentile   cutoff   for our facility and is consistent with 3rd generation assay   performance.         TV mean gradient 14               TV resting pulmonary artery pressure 21               WBC     9.13           ZLVIDD -1.96               ZLVIDS -1.32                                      Significant Imaging: I have reviewed all pertinent imaging results/findings within the past 24 hours.

## 2024-05-09 NOTE — PLAN OF CARE
[facility] - Telemetry (Hospital)  Initial Discharge Assessment       Primary Care Provider: Grace Duron MD    Admission Diagnosis: Acute pulmonary edema [J81.0]  Dyspnea on exertion [R06.09]  Chest pain at rest [R07.9]  Chest pain [R07.9]  Acute congestive heart failure, unspecified heart failure type [I50.9]    Admission Date: 5/8/2024  Expected Discharge Date:     Transition of Care Barriers: (P) None    Payor: MEDICARE / Plan: MEDICARE PART A & B / Product Type: Government /     Extended Emergency Contact Information  Primary Emergency Contact: Alice Wood   [place]  Mobile Phone: [phone]  Relation: Daughter    Discharge Plan A: (P) Home with family  Discharge Plan B: (P) Home Health      [facility] [place] [id number] [address] [address]  Phone: [phone] Fax: [phone]      Initial Assessment (most recent)       Adult Discharge Assessment - 05/09/24 1439          Discharge Assessment    Assessment Type Discharge Planning Assessment (P)      Confirmed/corrected address, phone number and insurance Yes (P)      Confirmed Demographics Correct on Facesheet (P)      Source of Information patient (P)      Communicated SHAD with patient/caregiver Date not available/Unable to determine (P)      Reason For Admission chest pain (P)      People in Home child(astrid), adult;grandchild(astrid) (P)      Facility Arrived From: home (P)      Do you expect to return to your current living situation? Yes (P)      Do you have help at home or someone to help you manage your care at home? Yes (P)      Who are your caregiver(s) and their phone number(s)? daughter (P)      Prior to hospitilization cognitive status: Alert/Oriented (P)      Current cognitive status: Alert/Oriented (P)      Walking or Climbing Stairs Difficulty yes (P)      Walking or Climbing Stairs ambulation difficulty, requires equipment (P)      Mobility Management uses rolling walker (P)       Dressing/Bathing Difficulty no (P)      Equipment Currently Used at Home walker, rolling (P)      Readmission within 30 days? No (P)      Patient currently being followed by outpatient case management? No (P)      Do you currently have service(s) that help you manage your care at home? No (P)      Do you take prescription medications? Yes (P)      Do you have prescription coverage? Yes (P)      Coverage [employer] (P)      Do you have any problems affording any of your prescribed medications? No (P)      Who is going to help you get home at discharge? daughter or son (P)      How do you get to doctors appointments? family or friend will provide (P)      Are you on dialysis? No (P)      Do you take coumadin? No (P)      Discharge Plan A Home with family (P)      Discharge Plan B Home Health (P)      DME Needed Upon Discharge  none (P)      Discharge Plan discussed with: Patient (P)      Transition of Care Barriers None (P)         Housing Stability    In the last 12 months, was there a time when you were not able to pay the mortgage or rent on time? No (P)      At any time in the past 12 months, were you homeless or living in a shelter (including now)? No (P)         Transportation Needs    Has the lack of transportation kept you from medical appointments, meetings, work or from getting things needed for daily living? No (P)         Food Insecurity    Within the past 12 months, you worried that your food would run out before you got the money to buy more. Never true (P)      Within the past 12 months, the food you bought just didn't last and you didn't have money to get more. Never true (P)                    Patient lives with daughter who can be his help at home as well as 18 year old granddaughter.  She ambulates with a rolling walker and is able to bathe herself.  Currently no needs.

## 2024-05-09 NOTE — ASSESSMENT & PLAN NOTE
Patient is identified as having Diastolic (HFpEF) heart failure that is Acute on chronic. CHF is currently controlled. Latest ECHO performed and demonstrates- No results found for this or any previous visit.  . Continue Beta Blocker and Furosemide and monitor clinical status closely. Monitor on telemetry. Patient is off CHF pathway.  Monitor strict Is&Os and daily weights.  Place on fluid restriction of 1.5 L. Cardiology has been consulted. Continue to stress to patient importance of self efficacy and  on diet for CHF. Last BNP reviewed- and noted below   Recent Labs   Lab 05/08/24  1811   *     Monitor fluid/volume status  IV Lasix given per ED due to pulmonary edema noted on CXR  Recent heart catheterization -- LV EF 60%

## 2024-05-09 NOTE — ASSESSMENT & PLAN NOTE
Patient with known CAD s/p stent placement and CABG, which is uncontrolled Will continue  Brilinta, Ranexa  and monitor for S/Sx of angina/ACS. Continue to monitor on telemetry.     Patient had recent heart catheterization and stents placed to LAD, left circumflex and left main on April 16, 2024 at [place]  At that time she was taken off of Plavix and started on Brilinta as well as Ranexa and Entresto  Consult cardiology  Continue home medications - Brilinta, Ranexa  Entresto held due to elevated Crit 1.7  Cardiac monitoring  PRN morphine ordered  She is also complaining of pain radiating into left neck - -will try muscle relaxant as well  Not on statin due to statin intolerance

## 2024-05-09 NOTE — ASSESSMENT & PLAN NOTE
Not currently on medication, she does not appear particularly anxious at this time, however it could be contributing to her symptoms

## 2024-05-09 NOTE — ASSESSMENT & PLAN NOTE
Patient is identified as having Diastolic (HFpEF) heart failure that is Acute on chronic. CHF is currently controlled. Latest ECHO performed and demonstrates- No results found for this or any previous visit.  . Continue Beta Blocker and Furosemide and monitor clinical status closely. Monitor on telemetry. Patient is off CHF pathway.  Monitor strict Is&Os and daily weights.  Place on fluid restriction of 1.5 L. Cardiology has been consulted. Continue to stress to patient importance of self efficacy and  on diet for CHF. Last BNP reviewed- and noted below   Recent Labs   Lab 05/08/24  1811   *       Monitor fluid/volume status  IV Lasix given per ED due to pulmonary edema noted on CXR  Recent heart catheterization -- LV EF 60%  BNP mildly elevated and CXR concerning for pulmonary edema  Additional dose of IV Lasix today  Continue BB as tolerated  Hold Entresto given LINDSAY  TTE with normal EF

## 2024-05-09 NOTE — ASSESSMENT & PLAN NOTE
Patient with known CAD s/p stent placement and CABG, which is uncontrolled Will continue  Brilinta, Ranexa  and monitor for S/Sx of angina/ACS. Continue to monitor on telemetry.     Patient had recent heart catheterization and stents placed to LAD, left circumflex and left main on April 16, 2024 at [facility]  At that time she was taken off of Plavix and started on Brilinta as well as Ranexa and Entresto  Consult cardiology  Continue home medications - Brilinta, Ranexa, and Entresto  Cardiac monitoring  PRN morphine ordered  She is also complaining of pain radiating into left neck - -will try muscle relaxant as well  Not on statin due to statin intolerance

## 2024-05-09 NOTE — ASSESSMENT & PLAN NOTE
Patient's anemia is currently controlled. Has not received any PRBCs to date.   Current CBC reviewed-   Lab Results   Component Value Date    HGB 9.6 (L) 05/08/2024    HCT 29.9 (L) 05/08/2024     Monitor serial CBC and transfuse if patient becomes hemodynamically unstable, symptomatic or H/H drops below 7/21.

## 2024-05-09 NOTE — PLAN OF CARE
Problem: Adult Inpatient Plan of Care  Goal: Plan of Care Review  Outcome: Progressing  Goal: Patient-Specific Goal (Individualized)  Outcome: Progressing  Goal: Absence of Hospital-Acquired Illness or Injury  Outcome: Progressing  Goal: Optimal Comfort and Wellbeing  Outcome: Progressing  Goal: Readiness for Transition of Care  Outcome: Progressing     Problem: Infection  Goal: Absence of Infection Signs and Symptoms  Outcome: Progressing     Problem: Acute Kidney Injury/Impairment  Goal: Fluid and Electrolyte Balance  Outcome: Progressing  Goal: Improved Oral Intake  Outcome: Progressing  Goal: Effective Renal Function  Outcome: Progressing     Problem: Chest Pain  Goal: Resolution of Chest Pain Symptoms  Outcome: Progressing     Problem: Fall Injury Risk  Goal: Absence of Fall and Fall-Related Injury  Outcome: Progressing

## 2024-05-09 NOTE — CONSULTS
[facility] - Telemetry ([facility])  Cardiology  Consult Note    Patient Name: Paola Wood  MRN: [id number]  Admission Date: 5/8/2024  Hospital Length of Stay: 0 days  Code Status: Full Code   Attending Provider: Sapphire Stroud MD   Consulting Provider: Roula Sofia PA-C  Primary Care Physician: Grace Duron MD  Principal Problem:Acute chest pain    Patient information was obtained from patient, past medical records, and ER records.     Inpatient consult to Cardiology  Consult performed by: Roula Sofia PA-C  Consult ordered by: Adina Lyn NP        Subjective:     Chief Complaint: SOB/CP    HPI:   Ms. Wood is a 73 year old female patient whose current medical conditions include CAD s/p prior CABG with known occluded vein graft on the left and atretic LIMA, s/p recent PCI of LM, LCX, and LAD at [facility] on 4/16/24 due to UA, diastolic CHF, HTN, asthma, TIA, hyperlipidemia, statin, intolerance, and chronic anemia who presented to [facility] ED yesterday evening due to worsening SOB over the past 2-3 days. Patient reported she became winded with minimal activity, even when making her bed. Associated symptoms included upper back pain that radiated forward toward her chest and up her neck. She denied any associated fever, chills, nausea, vomiting, or diaphoresis. Initial workup in ED revealed creatinine of 1.7 and BNP of 312. CXR concerning for pulmonary edema and patient was subsequently given a dose of IV Lsix and admitted for further evaluation and treatment. Cardiology consulted to assist with management. Patient seen and examined today, resting in bed. Feels about the same since admission. Endorses LINN/CP. States she initially felt well post recent PCI procedure and only began feeling this way 2-3 days ago. She reports compliance with her medications, on ASA and Brilinta as OP. Serial troponin negative. BP/HR variable. EKG reviewed, SR with non  T wave inversions lateral leads.      Past Medical History:   Diagnosis Date    Allergy     AR (allergic rhinitis)     Asthma     CAD (coronary artery disease)     CHF (congestive heart failure)     Chronic pain     neck/back/arm/knee    GERD (gastroesophageal reflux disease)     Hypertension     Seizures     SOB (shortness of breath)     TIA (transient ischemic attack)        Past Surgical History:   Procedure Laterality Date    CATARACT EXTRACTION      CORONARY ANGIOPLASTY WITH STENT PLACEMENT      CORONARY ARTERY BYPASS GRAFT      HYSTERECTOMY  1983    partial (Ovaries removed 1998)    OOPHORECTOMY  1998       Review of patient's allergies indicates:   Allergen Reactions    Ace inhibitors Swelling     Other reaction(s): tongue swelling  Other reaction(s): Other (See Comments)  Other reaction(s): tongue swelling      Bacitracin     Ezetimibe     Lisinopril      Other reaction(s): Not available    Neomycin     Niacin     Polymyxin b     Atorvastatin Rash    Rosuvastatin Nausea And Vomiting    Statins-hmg-coa reductase inhibitors Nausea And Vomiting     Other reaction(s): Not available       Current Facility-Administered Medications on File Prior to Encounter   Medication    [DISCONTINUED] GENERIC EXTERNAL MEDICATION    [DISCONTINUED] GENERIC EXTERNAL MEDICATION     Current Outpatient Medications on File Prior to Encounter   Medication Sig    amitriptyline (ELAVIL) 150 MG Tab Take 150 mg by mouth every evening.    amLODIPine (NORVASC) 5 MG tablet TAKE 1 TABLET BY MOUTH ONCE DAILY    aspirin (ECOTRIN) 81 MG EC tablet Take 81 mg by mouth once daily.    metoprolol succinate (TOPROL XL) 50 MG 24 hr tablet 1 and 1/2 in the AM.  1 and 1/2 at night.    nitroGLYCERIN (NITROSTAT) 0.4 MG SL tablet USE AS DIRECTED    oxyCODONE-acetaminophen (PERCOCET)  mg per tablet Take 1 tablet by mouth every 4 (four) hours as needed.    pantoprazole (PROTONIX) 40 MG tablet Take 1 tablet (40 mg total) by mouth once daily. PROTONIX 40 MG TBEC    alirocumab (PRALUENT  PEN) 75 mg/mL PnIj Inject 1 mL (75 mg total) into the skin every 14 (fourteen) days.    chlorthalidone (HYGROTEN) 25 MG Tab Take 25 mg by mouth every morning.    cloNIDine (CATAPRES) 0.2 MG tablet Take 1 tablet (0.2 mg total) by mouth as needed.    clopidogrel (PLAVIX) 75 mg tablet Take 75 mg by mouth once daily.    cyproheptadine (PERIACTIN) 4 mg tablet Take 4 mg by mouth 3 (three) times daily as needed.    folic acid (FOLVITE) 1 MG tablet Take 1 tablet (1 mg total) by mouth once daily. FOLIC ACID 1 MG TABS    furosemide (LASIX) 20 MG tablet Take 20 mg by mouth.    gemfibroziL (LOPID) 600 MG tablet Take 1 tablet (600 mg total) by mouth in the morning and 1 tablet (600 mg total) in the evening. Take before meals.    hydrALAZINE (APRESOLINE) 50 MG tablet Take 50 mg by mouth 3 (three) times daily.    isosorbide mononitrate (IMDUR) 30 MG 24 hr tablet Take 30 mg by mouth every morning.    losartan (COZAAR) 50 MG tablet TAKE 1 TABLET BY MOUTH EVERY DAY    potassium chloride SA (K-DUR,KLOR-CON) 20 MEQ tablet Take 20 mEq by mouth once daily.    ranolazine (RANEXA) 500 MG Tb12 Take 500 mg by mouth 2 (two) times daily.    ranolazine (RANEXA) 500 MG Tb12 Take 500 mg by mouth 2 (two) times daily.    sacubitriL-valsartan (ENTRESTO) 24-26 mg per tablet Take 1 tablet by mouth 2 (two) times daily.    ticagrelor (BRILINTA) 90 mg tablet Take 90 mg by mouth 2 (two) times daily.     Family History       Problem Relation (Age of Onset)    Breast cancer Sister (53)    Heart disease Mother, Father    No Known Problems Maternal Grandmother, Maternal Grandfather, Paternal Grandmother, Paternal Grandfather          Tobacco Use    Smoking status: Never    Smokeless tobacco: Never   Substance and Sexual Activity    Alcohol use: No    Drug use: No    Sexual activity: Not Currently     Review of Systems   Constitutional: Positive for malaise/fatigue.   HENT: Negative.     Eyes: Negative.    Cardiovascular:  Positive for chest pain and dyspnea  on exertion.   Respiratory:  Positive for shortness of breath.    Endocrine: Negative.    Hematologic/Lymphatic: Negative.    Skin: Negative.    Musculoskeletal:  Positive for back pain and neck pain.   Gastrointestinal: Negative.    Genitourinary: Negative.    Neurological: Negative.    Psychiatric/Behavioral: Negative.     Allergic/Immunologic: Negative.      Objective:     Vital Signs (Most Recent):  Temp: 98.1 °F (36.7 °C) (05/09/24 1211)  Pulse: 62 (05/09/24 1211)  Resp: 19 (05/09/24 1211)  BP: 117/61 (05/09/24 1211)  SpO2: 99 % (05/09/24 1211) Vital Signs (24h Range):  Temp:  [97.8 °F (36.6 °C)-98.3 °F (36.8 °C)] 98.1 °F (36.7 °C)  Pulse:  [50-74] 62  Resp:  [13-30] 19  SpO2:  [95 %-100 %] 99 %  BP: ()/(45-94) 117/61     Weight: 68.9 kg (152 lb)  Body mass index is 26.93 kg/m².    SpO2: 99 %         Intake/Output Summary (Last 24 hours) at 5/9/2024 1417  Last data filed at 5/9/2024 1022  Gross per 24 hour   Intake 240 ml   Output --   Net 240 ml       Lines/Drains/Airways       Peripheral Intravenous Line  Duration                  Peripheral IV - Single Lumen 05/08/24 1822 20 G Anterior;Left;Proximal Forearm <1 day                     Physical Exam  Vitals and nursing note reviewed.   Constitutional:       General: She is not in acute distress.     Appearance: Normal appearance. She is well-developed. She is not diaphoretic.   HENT:      Head: Normocephalic and atraumatic.   Eyes:      General:         Right eye: No discharge.         Left eye: No discharge.      Pupils: Pupils are equal, round, and reactive to light.   Cardiovascular:      Rate and Rhythm: Normal rate and regular rhythm.      Heart sounds: Normal heart sounds, S1 normal and S2 normal. No murmur heard.  Pulmonary:      Effort: Pulmonary effort is normal. No respiratory distress.      Comments: Faint coarseness at bases  Abdominal:      General: There is no distension.   Musculoskeletal:      Right lower leg: No edema.      Left lower  leg: No edema.   Skin:     General: Skin is warm and dry.      Findings: No erythema.   Neurological:      General: No focal deficit present.      Mental Status: She is alert and oriented to person, place, and time.   Psychiatric:         Mood and Affect: Mood normal.         Behavior: Behavior normal.          Significant Labs: CMP   Recent Labs   Lab 05/08/24  1811 05/09/24  0540    139   K 4.9 4.3    105   CO2 21* 23   GLU 93 96   BUN 31* 34*   CREATININE 1.7* 1.7*   CALCIUM 9.9 9.2   PROT 8.2 7.4   ALBUMIN 3.5 3.2*   BILITOT 0.3 0.2   ALKPHOS 91 83   AST 16 13   ALT 13 11   ANIONGAP 11 11   , CBC   Recent Labs   Lab 05/08/24  1811 05/09/24  0539   WBC 9.58 9.13   HGB 9.6* 9.8*   HCT 29.9* 31.0*    338   , Troponin   Recent Labs   Lab 05/08/24  2246 05/09/24  0216 05/09/24  0540   TROPONINI 0.022 0.025 0.019   , and All pertinent lab results from the last 24 hours have been reviewed.    Significant Imaging: Echocardiogram: Transthoracic echo (TTE) complete ([facility] Only):   Results for orders placed or performed during the hospital encounter of 05/08/24   Echo   Result Value Ref Range    BSA 1.75 m2    LA WIDTH 3.0 cm    RA Width 2.6 cm    LVOT stroke volume 64.84 cm3    LVIDd 3.92 3.5 - 6.0 cm    LV Systolic Volume 22.35 mL    LV Systolic Volume Index 13.0 mL/m2    LVIDs 2.50 2.1 - 4.0 cm    LV Diastolic Volume 66.74 mL    LV Diastolic Volume Index 38.80 mL/m2    IVS 1.09 0.6 - 1.1 cm    LVOT diameter 1.96 cm    LVOT area 3.0 cm2    FS 36 28 - 44 %    Left Ventricle Relative Wall Thickness 0.54 cm    Posterior Wall 1.06 0.6 - 1.1 cm    LV mass 136.56 g    LV Mass Index 79 g/m2    MV Peak E Bj 0.87 m/s    TDI LATERAL 0.07 m/s    TDI SEPTAL 0.05 m/s    E/E' ratio 14.50 m/s    MV Peak A Bj 0.93 m/s    TR Max Bj 2.14 m/s    E/A ratio 0.94     IVRT 65.65 msec    E wave deceleration time 215.38 msec    LV SEPTAL E/E' RATIO 17.40 m/s    LA Volume Index 22.1 mL/m2    LV LATERAL E/E' RATIO 12.43 m/s     LA volume 37.95 cm3    LVOT peak consuelo 0.98 m/s    Left Ventricular Outflow Tract Mean Velocity 0.83 cm/s    Left Ventricular Outflow Tract Mean Gradient 2.77 mmHg    RVOT peak VTI 17.2 cm    TAPSE 1.75 cm    LA size 3.17 cm    Left Atrium Minor Axis 4.67 cm    Left Atrium Major Axis 4.72 cm    RA Major Axis 3.57 cm    AV mean gradient 5 mmHg    AV peak gradient 8 mmHg    Ao peak consuelo 1.43 m/s    Ao VTI 24.20 cm    LVOT peak VTI 21.50 cm    AV valve area 2.68 cm²    AV Velocity Ratio 0.69     AV index (prosthetic) 0.89     YANIRA by Velocity Ratio 2.07 cm²    Mr max consuelo 3.28 m/s    MV stenosis pressure 1/2 time 62.46 ms    MV valve area p 1/2 method 3.52 cm2    TV mean gradient 14 mmHg    Triscuspid Valve Regurgitation Peak Gradient 18 mmHg    PV mean gradient 1 mmHg    RVOT peak consuelo 0.66 m/s    Ao root annulus 2.64 cm    STJ 2.57 cm    Ascending aorta 2.47 cm    IVC diameter 1.41 cm    Mean e' 0.06 m/s    ZLVIDS -1.32     ZLVIDD -1.96     EF 60 %    TV resting pulmonary artery pressure 21 mmHg    RV TB RVSP 5 mmHg    Est. RA pres 3 mmHg    Narrative      Left Ventricle: The left ventricle is normal in size. Normal wall   thickness. There is concentric remodeling. Normal wall motion. Ejection   fraction by visual approximation is 60%. There is indeterminate diastolic   function.    Right Ventricle: Normal right ventricular cavity size. Wall thickness   is normal. Right ventricle wall motion  is normal. Systolic function is   normal.    Aortic Valve: The aortic valve is a trileaflet valve. There is mild   aortic valve sclerosis.    Pulmonary Artery: The estimated pulmonary artery systolic pressure is   21 mmHg.    IVC/SVC: Normal venous pressure at 3 mmHg.      and EKG: Reviewed  Assessment and Plan:   Patient with CAD s/p recent complex intervention/stenting who presents with CP/SOB. Serial troponin negative. BNP mildly elevated/CXR concerning for pulmonary edema. Assess response to IV diuresis. Will consider addition  of nitrates.     * Acute chest pain  -Presents with SOB/LINN and stabbing heavy CP  -Recent complex PCI of LM, LAD, and LCX at [facility] on 4/16/24  -Serial troponin negative  -TTE with normal EF  -Continue ASA, Brilinta, BB, Ranexa  -Will consider addition of nitrates    LINN (dyspnea on exertion)  -Assess response to IV diuresis  -? Related to Brilinta    Acute on chronic diastolic congestive heart failure  -BNP mildly elevated and CXR concerning for pulmonary edema  -Additional dose of IV Lasix today  -Continue BB as tolerated  -Hold Entresto given LINDSAY  -TTE with normal EF    Acute pulmonary edema  -Additional dose of IV Lasix today, reassess in AM  -TTE with normal EF    LINDSAY (acute kidney injury)  -Monitor with IV diuresis     Hypertension  -BP labile  -Continue BB as tolerated  -Hold Entresto given bumped creatinine    Hyperlipidemia  -Statin intolerant    Coronary artery disease of bypass graft of native heart with stable angina pectoris  -See plan under CP      Anemia in chronic illness  -Stable, monitor        VTE Risk Mitigation (From admission, onward)           Ordered     IP VTE HIGH RISK PATIENT  Once         05/08/24 1957     Place sequential compression device  Until discontinued         05/08/24 1957                    Thank you for your consult. I will follow-up with patient. Please contact us if you have any additional questions.    Roula Sofia PA-C  Cardiology   [facility] - Telemetry ([facility])

## 2024-05-10 LAB
ALBUMIN SERPL BCP-MCNC: 3.1 G/DL (ref 3.5–5.2)
ALBUMIN SERPL BCP-MCNC: 3.5 G/DL (ref 3.5–5.2)
ALP SERPL-CCNC: 85 U/L (ref 55–135)
ALP SERPL-CCNC: 96 U/L (ref 55–135)
ALT SERPL W/O P-5'-P-CCNC: 11 U/L (ref 10–44)
ALT SERPL W/O P-5'-P-CCNC: 9 U/L (ref 10–44)
ANION GAP SERPL CALC-SCNC: 10 MMOL/L (ref 8–16)
ANION GAP SERPL CALC-SCNC: 14 MMOL/L (ref 8–16)
AST SERPL-CCNC: 14 U/L (ref 10–40)
AST SERPL-CCNC: 22 U/L (ref 10–40)
BASOPHILS # BLD AUTO: 0.07 K/UL (ref 0–0.2)
BASOPHILS NFR BLD: 0.8 % (ref 0–1.9)
BILIRUB SERPL-MCNC: 0.3 MG/DL (ref 0.1–1)
BILIRUB SERPL-MCNC: 0.3 MG/DL (ref 0.1–1)
BUN SERPL-MCNC: 35 MG/DL (ref 8–23)
BUN SERPL-MCNC: 36 MG/DL (ref 8–23)
CALCIUM SERPL-MCNC: 8.9 MG/DL (ref 8.7–10.5)
CALCIUM SERPL-MCNC: 9.2 MG/DL (ref 8.7–10.5)
CHLORIDE SERPL-SCNC: 104 MMOL/L (ref 95–110)
CHLORIDE SERPL-SCNC: 107 MMOL/L (ref 95–110)
CO2 SERPL-SCNC: 16 MMOL/L (ref 23–29)
CO2 SERPL-SCNC: 22 MMOL/L (ref 23–29)
CREAT SERPL-MCNC: 1.7 MG/DL (ref 0.5–1.4)
CREAT SERPL-MCNC: 1.7 MG/DL (ref 0.5–1.4)
DIFFERENTIAL METHOD BLD: ABNORMAL
EOSINOPHIL # BLD AUTO: 0.3 K/UL (ref 0–0.5)
EOSINOPHIL NFR BLD: 3.1 % (ref 0–8)
ERYTHROCYTE [DISTWIDTH] IN BLOOD BY AUTOMATED COUNT: 14.6 % (ref 11.5–14.5)
EST. GFR  (NO RACE VARIABLE): 32 ML/MIN/1.73 M^2
EST. GFR  (NO RACE VARIABLE): 32 ML/MIN/1.73 M^2
GLUCOSE SERPL-MCNC: 100 MG/DL (ref 70–110)
GLUCOSE SERPL-MCNC: 110 MG/DL (ref 70–110)
HCT VFR BLD AUTO: 36.8 % (ref 37–48.5)
HGB BLD-MCNC: 11.3 G/DL (ref 12–16)
IMM GRANULOCYTES # BLD AUTO: 0.05 K/UL (ref 0–0.04)
IMM GRANULOCYTES NFR BLD AUTO: 0.6 % (ref 0–0.5)
LYMPHOCYTES # BLD AUTO: 1.4 K/UL (ref 1–4.8)
LYMPHOCYTES NFR BLD: 16.6 % (ref 18–48)
MAGNESIUM SERPL-MCNC: 2.7 MG/DL (ref 1.6–2.6)
MCH RBC QN AUTO: 28.7 PG (ref 27–31)
MCHC RBC AUTO-ENTMCNC: 30.7 G/DL (ref 32–36)
MCV RBC AUTO: 93 FL (ref 82–98)
MONOCYTES # BLD AUTO: 0.9 K/UL (ref 0.3–1)
MONOCYTES NFR BLD: 10.6 % (ref 4–15)
NEUTROPHILS # BLD AUTO: 5.7 K/UL (ref 1.8–7.7)
NEUTROPHILS NFR BLD: 68.3 % (ref 38–73)
NRBC BLD-RTO: 0 /100 WBC
OHS QRS DURATION: 86 MS
OHS QTC CALCULATION: 428 MS
PLATELET # BLD AUTO: 356 K/UL (ref 150–450)
PMV BLD AUTO: 9.3 FL (ref 9.2–12.9)
POTASSIUM SERPL-SCNC: 4.1 MMOL/L (ref 3.5–5.1)
POTASSIUM SERPL-SCNC: 5.2 MMOL/L (ref 3.5–5.1)
PROT SERPL-MCNC: 7.5 G/DL (ref 6–8.4)
PROT SERPL-MCNC: 8.1 G/DL (ref 6–8.4)
RBC # BLD AUTO: 3.94 M/UL (ref 4–5.4)
SODIUM SERPL-SCNC: 136 MMOL/L (ref 136–145)
SODIUM SERPL-SCNC: 137 MMOL/L (ref 136–145)
WBC # BLD AUTO: 8.39 K/UL (ref 3.9–12.7)

## 2024-05-10 PROCEDURE — 83735 ASSAY OF MAGNESIUM: CPT | Performed by: NURSE PRACTITIONER

## 2024-05-10 PROCEDURE — A4216 STERILE WATER/SALINE, 10 ML: HCPCS | Performed by: NURSE PRACTITIONER

## 2024-05-10 PROCEDURE — 99233 SBSQ HOSP IP/OBS HIGH 50: CPT | Mod: ,,, | Performed by: STUDENT IN AN ORGANIZED HEALTH CARE EDUCATION/TRAINING PROGRAM

## 2024-05-10 PROCEDURE — 36415 COLL VENOUS BLD VENIPUNCTURE: CPT | Performed by: NURSE PRACTITIONER

## 2024-05-10 PROCEDURE — 63600175 PHARM REV CODE 636 W HCPCS: Performed by: PHYSICIAN ASSISTANT

## 2024-05-10 PROCEDURE — 25000003 PHARM REV CODE 250: Performed by: PHYSICIAN ASSISTANT

## 2024-05-10 PROCEDURE — 85025 COMPLETE CBC W/AUTO DIFF WBC: CPT | Performed by: NURSE PRACTITIONER

## 2024-05-10 PROCEDURE — 25000003 PHARM REV CODE 250: Performed by: NURSE PRACTITIONER

## 2024-05-10 PROCEDURE — 36415 COLL VENOUS BLD VENIPUNCTURE: CPT

## 2024-05-10 PROCEDURE — 63600175 PHARM REV CODE 636 W HCPCS: Performed by: INTERNAL MEDICINE

## 2024-05-10 PROCEDURE — 21400001 HC TELEMETRY ROOM

## 2024-05-10 PROCEDURE — 80053 COMPREHEN METABOLIC PANEL: CPT | Performed by: NURSE PRACTITIONER

## 2024-05-10 PROCEDURE — 80053 COMPREHEN METABOLIC PANEL: CPT | Mod: 91

## 2024-05-10 RX ORDER — FUROSEMIDE 10 MG/ML
40 INJECTION INTRAMUSCULAR; INTRAVENOUS ONCE
Status: COMPLETED | OUTPATIENT
Start: 2024-05-10 | End: 2024-05-10

## 2024-05-10 RX ADMIN — FUROSEMIDE 40 MG: 10 INJECTION, SOLUTION INTRAMUSCULAR; INTRAVENOUS at 01:05

## 2024-05-10 RX ADMIN — AMLODIPINE BESYLATE 5 MG: 5 TABLET ORAL at 08:05

## 2024-05-10 RX ADMIN — GEMFIBROZIL 600 MG: 600 TABLET ORAL at 05:05

## 2024-05-10 RX ADMIN — AMITRIPTYLINE HYDROCHLORIDE 150 MG: 50 TABLET, FILM COATED ORAL at 09:05

## 2024-05-10 RX ADMIN — GEMFIBROZIL 600 MG: 600 TABLET ORAL at 04:05

## 2024-05-10 RX ADMIN — ASPIRIN 81 MG: 81 TABLET, COATED ORAL at 08:05

## 2024-05-10 RX ADMIN — POLYETHYLENE GLYCOL 3350 17 G: 17 POWDER, FOR SOLUTION ORAL at 09:05

## 2024-05-10 RX ADMIN — TICAGRELOR 90 MG: 90 TABLET ORAL at 09:05

## 2024-05-10 RX ADMIN — METOPROLOL SUCCINATE 25 MG: 25 TABLET, FILM COATED, EXTENDED RELEASE ORAL at 08:05

## 2024-05-10 RX ADMIN — RANOLAZINE 500 MG: 500 TABLET, EXTENDED RELEASE ORAL at 08:05

## 2024-05-10 RX ADMIN — METOPROLOL SUCCINATE 25 MG: 25 TABLET, FILM COATED, EXTENDED RELEASE ORAL at 09:05

## 2024-05-10 RX ADMIN — Medication 3 ML: at 01:05

## 2024-05-10 RX ADMIN — Medication 3 ML: at 05:05

## 2024-05-10 RX ADMIN — Medication 3 ML: at 10:05

## 2024-05-10 RX ADMIN — PANTOPRAZOLE SODIUM 40 MG: 40 TABLET, DELAYED RELEASE ORAL at 08:05

## 2024-05-10 RX ADMIN — TICAGRELOR 90 MG: 90 TABLET ORAL at 08:05

## 2024-05-10 RX ADMIN — RANOLAZINE 500 MG: 500 TABLET, EXTENDED RELEASE ORAL at 09:05

## 2024-05-10 RX ADMIN — MORPHINE SULFATE 2 MG: 4 INJECTION INTRAVENOUS at 01:05

## 2024-05-10 NOTE — ASSESSMENT & PLAN NOTE
Lasix 60 mg IV given per ED  Monitor respiratory status and CXR  Additional dose of Lasix 40mg IV ordered for 5/9  Additional dose of Lasix 40mg IV ordered for 5/10

## 2024-05-10 NOTE — ASSESSMENT & PLAN NOTE
Acute worsening of what sounds like chronic chest pain  She was recently started on Entresto, Ranexa, and Brilinta after stents placed to LAD, left circumflex and left main at [facility]  Morphine ordered PRN  Trending troponins, initial troponin normal  Cardiac monitoring ordered  Cardiology consult   Continue ASA, Brilinta, BB, Ranexa  Will consider addition of nitrates on 5/9   Addition of low dose nitropatch if BP tolerates it 5/10

## 2024-05-10 NOTE — PROGRESS NOTES
[facility] - Telemetry ([facility])  Cardiology  Progress Note    Patient Name: Paola Wood  MRN: [id number]  Admission Date: 5/8/2024  Hospital Length of Stay: 1 days  Code Status: Full Code   Attending Physician: Sapphire Stroud MD   Primary Care Physician: Grace Duron MD  Expected Discharge Date:   Principal Problem:Acute chest pain    Subjective:   HPI:  Ms. Wood is a 73 year old female patient whose current medical conditions include CAD s/p prior CABG with known occluded vein graft on the left and atretic LIMA, s/p recent PCI of LM, LCX, and LAD at [place] on 4/16/24 due to UA, diastolic CHF, HTN, asthma, TIA, hyperlipidemia, statin, intolerance, and chronic anemia who presented to [facility] ED yesterday evening due to worsening SOB over the past 2-3 days. Patient reported she became winded with minimal activity, even when making her bed. Associated symptoms included upper back pain that radiated forward toward her chest and up her neck. She denied any associated fever, chills, nausea, vomiting, or diaphoresis. Initial workup in ED revealed creatinine of 1.7 and BNP of 312. CXR concerning for pulmonary edema and patient was subsequently given a dose of IV Lsix and admitted for further evaluation and treatment. Cardiology consulted to assist with management. Patient seen and examined today, resting in bed. Feels about the same since admission. Endorses LINN/CP. States she initially felt well post recent PCI procedure and only began feeling this way 2-3 days ago. She reports compliance with her medications, on ASA and Brilinta as OP. Serial troponin negative. BP/HR variable. EKG reviewed, SR with non  T wave inversions lateral leads.     Hospital Course:   5/10/24-Patient seen and examined today, sitting up in bed. Feeling better. SOB greatly improved. Still with intermittent CP, but also notes improvement. Labs reviewed. Serial troponin negative. Creatinine stable at 1.7. BP soft, meds  adjusted. TTE with normal EF.      Review of Systems   Constitutional: Positive for malaise/fatigue.   HENT: Negative.     Eyes: Negative.    Cardiovascular:  Positive for chest pain (improved but still intermittent) and dyspnea on exertion (improved).   Respiratory:  Positive for shortness of breath (improved).    Endocrine: Negative.    Hematologic/Lymphatic: Negative.    Skin: Negative.    Musculoskeletal:  Positive for arthritis and joint pain.   Gastrointestinal: Negative.    Genitourinary: Negative.    Neurological: Negative.    Psychiatric/Behavioral: Negative.     Allergic/Immunologic: Negative.      Objective:     Vital Signs (Most Recent):  Temp: 97.5 °F (36.4 °C) (05/10/24 1145)  Pulse: 61 (05/10/24 1145)  Resp: 18 (05/10/24 1145)  BP: (!) 111/51 (05/10/24 1145)  SpO2: 97 % (05/10/24 1145) Vital Signs (24h Range):  Temp:  [97.5 °F (36.4 °C)-98.9 °F (37.2 °C)] 97.5 °F (36.4 °C)  Pulse:  [57-68] 61  Resp:  [15-20] 18  SpO2:  [97 %-99 %] 97 %  BP: (100-128)/(48-58) 111/51     Weight: 68.9 kg (152 lb)  Body mass index is 26.93 kg/m².     SpO2: 97 %         Intake/Output Summary (Last 24 hours) at 5/10/2024 1228  Last data filed at 5/10/2024 0823  Gross per 24 hour   Intake 240 ml   Output 11 ml   Net 229 ml       Lines/Drains/Airways       Peripheral Intravenous Line  Duration                  Peripheral IV - Single Lumen 05/10/24 1055 22 G Left;Posterior Forearm <1 day                       Physical Exam  Vitals and nursing note reviewed.   Constitutional:       General: She is not in acute distress.     Appearance: Normal appearance. She is well-developed. She is not diaphoretic.   HENT:      Head: Normocephalic and atraumatic.   Eyes:      General:         Right eye: No discharge.         Left eye: No discharge.      Pupils: Pupils are equal, round, and reactive to light.   Cardiovascular:      Rate and Rhythm: Normal rate and regular rhythm.      Heart sounds: Normal heart sounds, S1 normal and S2 normal.  No murmur heard.  Pulmonary:      Effort: Pulmonary effort is normal. No respiratory distress.      Comments: Slightly diminished at bases  Abdominal:      General: There is no distension.   Musculoskeletal:      Right lower leg: No edema.      Left lower leg: No edema.   Skin:     General: Skin is warm and dry.      Findings: No erythema.   Neurological:      General: No focal deficit present.      Mental Status: She is alert and oriented to person, place, and time.   Psychiatric:         Mood and Affect: Mood normal.         Behavior: Behavior normal.            Significant Labs: CMP   Recent Labs   Lab 05/08/24  1811 05/09/24  0540 05/10/24  0427    139 137   K 4.9 4.3 5.2*    105 107   CO2 21* 23 16*   GLU 93 96 100   BUN 31* 34* 36*   CREATININE 1.7* 1.7* 1.7*   CALCIUM 9.9 9.2 9.2   PROT 8.2 7.4 8.1   ALBUMIN 3.5 3.2* 3.5   BILITOT 0.3 0.2 0.3   ALKPHOS 91 83 96   AST 16 13 22   ALT 13 11 11   ANIONGAP 11 11 14   , CBC   Recent Labs   Lab 05/08/24  1811 05/09/24  0539 05/10/24  0427   WBC 9.58 9.13 8.39   HGB 9.6* 9.8* 11.3*   HCT 29.9* 31.0* 36.8*    338 356   , Troponin   Recent Labs   Lab 05/08/24  2246 05/09/24  0216 05/09/24  0540   TROPONINI 0.022 0.025 0.019   , and All pertinent lab results from the last 24 hours have been reviewed.    Significant Imaging: Echocardiogram: Transthoracic echo (TTE) complete ([facility] Only):   Results for orders placed or performed during the hospital encounter of 05/08/24   Echo   Result Value Ref Range    BSA 1.75 m2    LA WIDTH 3.0 cm    RA Width 2.6 cm    LVOT stroke volume 64.84 cm3    LVIDd 3.92 3.5 - 6.0 cm    LV Systolic Volume 22.35 mL    LV Systolic Volume Index 13.0 mL/m2    LVIDs 2.50 2.1 - 4.0 cm    LV Diastolic Volume 66.74 mL    LV Diastolic Volume Index 38.80 mL/m2    IVS 1.09 0.6 - 1.1 cm    LVOT diameter 1.96 cm    LVOT area 3.0 cm2    FS 36 28 - 44 %    Left Ventricle Relative Wall Thickness 0.54 cm    Posterior Wall 1.06 0.6 - 1.1 cm     LV mass 136.56 g    LV Mass Index 79 g/m2    MV Peak E Bj 0.87 m/s    TDI LATERAL 0.07 m/s    TDI SEPTAL 0.05 m/s    E/E' ratio 14.50 m/s    MV Peak A Bj 0.93 m/s    TR Max Bj 2.14 m/s    E/A ratio 0.94     IVRT 65.65 msec    E wave deceleration time 215.38 msec    LV SEPTAL E/E' RATIO 17.40 m/s    LA Volume Index 22.1 mL/m2    LV LATERAL E/E' RATIO 12.43 m/s    LA volume 37.95 cm3    LVOT peak bj 0.98 m/s    Left Ventricular Outflow Tract Mean Velocity 0.83 cm/s    Left Ventricular Outflow Tract Mean Gradient 2.77 mmHg    RVOT peak VTI 17.2 cm    TAPSE 1.75 cm    LA size 3.17 cm    Left Atrium Minor Axis 4.67 cm    Left Atrium Major Axis 4.72 cm    RA Major Axis 3.57 cm    AV mean gradient 5 mmHg    AV peak gradient 8 mmHg    Ao peak bj 1.43 m/s    Ao VTI 24.20 cm    LVOT peak VTI 21.50 cm    AV valve area 2.68 cm²    AV Velocity Ratio 0.69     AV index (prosthetic) 0.89     YANIRA by Velocity Ratio 2.07 cm²    Mr max bj 3.28 m/s    MV stenosis pressure 1/2 time 62.46 ms    MV valve area p 1/2 method 3.52 cm2    TV mean gradient 14 mmHg    Triscuspid Valve Regurgitation Peak Gradient 18 mmHg    PV mean gradient 1 mmHg    RVOT peak bj 0.66 m/s    Ao root annulus 2.64 cm    STJ 2.57 cm    Ascending aorta 2.47 cm    IVC diameter 1.41 cm    Mean e' 0.06 m/s    ZLVIDS -1.32     ZLVIDD -1.96     EF 60 %    TV resting pulmonary artery pressure 21 mmHg    RV TB RVSP 5 mmHg    Est. RA pres 3 mmHg    Narrative      Left Ventricle: The left ventricle is normal in size. Normal wall   thickness. There is concentric remodeling. Normal wall motion. Ejection   fraction by visual approximation is 60%. There is indeterminate diastolic   function.    Right Ventricle: Normal right ventricular cavity size. Wall thickness   is normal. Right ventricle wall motion  is normal. Systolic function is   normal.    Aortic Valve: The aortic valve is a trileaflet valve. There is mild   aortic valve sclerosis.    Pulmonary Artery: The  estimated pulmonary artery systolic pressure is   21 mmHg.    IVC/SVC: Normal venous pressure at 3 mmHg.      and EKG: Reviewed  Assessment and Plan:   Patient s/p recent multivessel intervention who presents with CP/SOB. Clinically improving with IV diuresis. Meds adjusted. Will add low dose nitropatch if BP permits.    * Acute chest pain  -Presents with SOB/LINN and stabbing heavy CP  -Recent complex PCI of LM, LAD, and LCX at [place] on 4/16/24  -Serial troponin negative  -TTE with normal EF  -Continue ASA, Brilinta, BB, Ranexa  -Will consider addition of nitrates    5/10/24  -SOB improved, will give additional dose of IV Lasix  -CP symptoms also improved  -Continue ASA, Brilinta, BB, Ranexa  -Will plan to add low dose nitropatch IF BP permits      LINN (dyspnea on exertion)  -Assess response to IV diuresis  -? Related to Brilinta    Acute on chronic diastolic congestive heart failure  -BNP mildly elevated and CXR concerning for pulmonary edema  -Additional dose of IV Lasix today  -Continue BB as tolerated  -Hold Entresto given LINDSAY  -TTE with normal EF    5/10/24  -Clinically improved  -Additional dose of IV Lasix today  -Continue BB    Acute pulmonary edema  -Additional dose of IV Lasix today, reassess in AM  -TTE with normal EF    5/10/24  -Additional Lasix today, clinically improved    LINDSAY (acute kidney injury)  -Monitor with IV diuresis     5/10/24  -Creatinine stable since admission    Hypertension  -BP labile  -Continue BB as tolerated  -Hold Entresto given bumped creatinine    Hyperlipidemia  -Statin intolerant    Coronary artery disease of bypass graft of native heart with stable angina pectoris  -See plan under CP      Anemia in chronic illness  -Stable, monitor        VTE Risk Mitigation (From admission, onward)           Ordered     IP VTE HIGH RISK PATIENT  Once         05/08/24 1957     Place sequential compression device  Until discontinued         05/08/24 1957                    Roula Sofia,  GENESIS  Cardiology  [facility] - Telemetry ([facility])

## 2024-05-10 NOTE — PLAN OF CARE
A205/A205 JOSEPH Wood is a 73 y.o.female admitted on 5/8/2024 for Acute chest pain   Code Status: Full Code MRN: [id number]   Review of patient's allergies indicates:   Allergen Reactions    Ace inhibitors Swelling     Other reaction(s): tongue swelling  Other reaction(s): Other (See Comments)  Other reaction(s): tongue swelling      Bacitracin     Ezetimibe     Lisinopril      Other reaction(s): Not available    Neomycin     Niacin     Polymyxin b     Atorvastatin Rash    Rosuvastatin Nausea And Vomiting    Statins-hmg-coa reductase inhibitors Nausea And Vomiting     Other reaction(s): Not available     Past Medical History:   Diagnosis Date    Allergy     AR (allergic rhinitis)     Asthma     CAD (coronary artery disease)     CHF (congestive heart failure)     Chronic pain     neck/back/arm/knee    GERD (gastroesophageal reflux disease)     Hypertension     Seizures     SOB (shortness of breath)     TIA (transient ischemic attack)       PRN meds    acetaminophen, 650 mg, Q4H PRN  aluminum-magnesium hydroxide-simethicone, 30 mL, QID PRN  dextrose 10%, 12.5 g, PRN  dextrose 10%, 25 g, PRN  glucagon (human recombinant), 1 mg, PRN  glucose, 16 g, PRN  glucose, 24 g, PRN  melatonin, 6 mg, Nightly PRN  morphine, 2 mg, Q4H PRN  naloxone, 0.02 mg, PRN  ondansetron, 4 mg, Q6H PRN  oxyCODONE-acetaminophen, 1 tablet, Q6H PRN  polyethylene glycol, 17 g, Daily PRN  promethazine, 25 mg, Q6H PRN      Chart check completed. Will continue plan of care.         Ronda Coma Scale Score: 15     Lead Monitored: Lead II Rhythm: normal sinus rhythm Frequency/Ectopy: PACs  Cardiac/Telemetry Box Number: [id number]  VTE Required Core Measure: (SCDs) Sequential compression device initiated/maintained Last Bowel Movement: 05/08/24  Diet Cardiac     Jassi Score: 20  Fall Risk Score: 10  Accucheck []   Freq?      Lines/Drains/Airways       Peripheral Intravenous Line  Duration                  Peripheral IV - Single Lumen 05/10/24 5564  22 G Left;Posterior Forearm <1 day                       Problem: Adult Inpatient Plan of Care  Goal: Plan of Care Review  Outcome: Progressing  Goal: Patient-Specific Goal (Individualized)  Outcome: Progressing  Goal: Absence of Hospital-Acquired Illness or Injury  Outcome: Progressing  Goal: Optimal Comfort and Wellbeing  Outcome: Progressing  Goal: Readiness for Transition of Care  Outcome: Progressing     Problem: Infection  Goal: Absence of Infection Signs and Symptoms  Outcome: Progressing     Problem: Acute Kidney Injury/Impairment  Goal: Fluid and Electrolyte Balance  Outcome: Progressing  Goal: Improved Oral Intake  Outcome: Progressing  Goal: Effective Renal Function  Outcome: Progressing     Problem: Chest Pain  Goal: Resolution of Chest Pain Symptoms  Outcome: Progressing     Problem: Fall Injury Risk  Goal: Absence of Fall and Fall-Related Injury  Outcome: Progressing

## 2024-05-10 NOTE — ASSESSMENT & PLAN NOTE
-Additional dose of IV Lasix today, reassess in AM  -TTE with normal EF    5/10/24  -Additional Lasix today, clinically improved

## 2024-05-10 NOTE — ASSESSMENT & PLAN NOTE
-BNP mildly elevated and CXR concerning for pulmonary edema  -Additional dose of IV Lasix today  -Continue BB as tolerated  -Hold Entresto given LINDSAY  -TTE with normal EF    5/10/24  -Clinically improved  -Additional dose of IV Lasix today  -Continue BB

## 2024-05-10 NOTE — SUBJECTIVE & OBJECTIVE
Interval History: f/u chest pain. Patient awake and alert, laying in bed. Patient reports improvement in CP 9/10 and states the pain is intermittent. Patient reports SOB worsens with exertion. Patient denies any nausea, vomiting, diarrhea, and abd pain. Cardiology recommends low dose nitropatch if patients BP tolerates it and additional dose of IV lasix today.     Review of Systems   Constitutional:  Positive for fatigue.   Respiratory:  Positive for shortness of breath.         Dyspnea on exertion    Cardiovascular:  Positive for chest pain (Reports improvement).   Musculoskeletal:  Positive for back pain and neck pain.     Objective:     Vital Signs (Most Recent):  Temp: 97.5 °F (36.4 °C) (05/10/24 1145)  Pulse: 61 (05/10/24 1145)  Resp: 18 (05/10/24 1145)  BP: (!) 111/51 (05/10/24 1145)  SpO2: 97 % (05/10/24 1145) Vital Signs (24h Range):  Temp:  [97.5 °F (36.4 °C)-98.9 °F (37.2 °C)] 97.5 °F (36.4 °C)  Pulse:  [57-68] 61  Resp:  [15-20] 18  SpO2:  [97 %-99 %] 97 %  BP: (100-128)/(48-58) 111/51     Weight: 68.9 kg (152 lb)  Body mass index is 26.93 kg/m².    Intake/Output Summary (Last 24 hours) at 5/10/2024 1252  Last data filed at 5/10/2024 0823  Gross per 24 hour   Intake 240 ml   Output 11 ml   Net 229 ml         Physical Exam  Vitals and nursing note reviewed.   Constitutional:       General: She is not in acute distress.     Appearance: She is not ill-appearing.   HENT:      Mouth/Throat:      Mouth: Mucous membranes are moist.      Pharynx: Oropharynx is clear.   Eyes:      Pupils: Pupils are equal, round, and reactive to light.   Cardiovascular:      Rate and Rhythm: Normal rate and regular rhythm.      Heart sounds: No murmur heard.  Pulmonary:      Effort: Pulmonary effort is normal.      Breath sounds: Normal breath sounds. No wheezing.   Abdominal:      General: Bowel sounds are normal. There is no distension.      Palpations: Abdomen is soft.      Tenderness: There is no abdominal tenderness.    Musculoskeletal:         General: Normal range of motion.   Skin:     General: Skin is warm and dry.   Neurological:      General: No focal deficit present.      Mental Status: She is alert and oriented to person, place, and time.             Significant Labs: All pertinent labs within the past 24 hours have been reviewed.  Recent Lab Results         05/10/24  0427   05/09/24  1345        Albumin 3.5         ALP 96         ALT 11         Anion Gap 14         AST 22         Baso # 0.07         Basophil % 0.8         BILIRUBIN TOTAL 0.3  Comment: For infants and newborns, interpretation of results should be based  on gestational age, weight and in agreement with clinical  observations.    Premature Infant recommended reference ranges:  Up to 24 hours.............<8.0 mg/dL  Up to 48 hours............<12.0 mg/dL  3-5 days..................<15.0 mg/dL  6-29 days.................<15.0 mg/dL           BUN 36         Calcium 9.2         Chloride 107         CO2 16         Creatinine 1.7         Differential Method Automated         eGFR 32         Eos # 0.3         Eos % 3.1         Glucose 100         Gran # (ANC) 5.7         Gran % 68.3         Hematocrit 36.8         Hemoglobin 11.3         Immature Grans (Abs) 0.05  Comment: Mild elevation in immature granulocytes is non specific and   can be seen in a variety of conditions including stress response,   acute inflammation, trauma and pregnancy. Correlation with other   laboratory and clinical findings is essential.           Immature Granulocytes 0.6         Lymph # 1.4         Lymph % 16.6         Magnesium  2.7         MCH 28.7         MCHC 30.7         MCV 93         Mono # 0.9         Mono % 10.6         MPV 9.3         nRBC 0         QRS Duration   86       OHS QTC Calculation   428       Platelet Count 356         Potassium 5.2         PROTEIN TOTAL 8.1         RBC 3.94         RDW 14.6         Sodium 137         WBC 8.39                 Significant Imaging: I  have reviewed all pertinent imaging results/findings within the past 24 hours.

## 2024-05-10 NOTE — HOSPITAL COURSE
5/10/24-Patient seen and examined today, sitting up in bed. Feeling better. SOB greatly improved. Still with intermittent CP, but also notes improvement. Labs reviewed. Serial troponin negative. Creatinine stable at 1.7. BP soft, meds adjusted. TTE with normal EF.    5/11/24- Patient seen and examined today lying flat in the bed. Pt reports SOB when ambulating. Patient with CP. But does note when sitting up Sob isn't as bad. Still IV diuresising. Labs reviewed. Labs stable. Creatinine has improved from 1.7 to 1.6. Kidney function has improved.      5/12/24- Patient seen and examined today lying in bed. Pt reports that she isn't feeling fine today and that she has a headache. Pt states that she is still SOB. Labs reviewed. Creatinine bumped from 1.6 to 1.9.     5/13/24-Patient seen and examined today, resting in bed. Feeling better. No CP or SOB during exam. Labs reviewed. Creatinine still 1.9. Brilinta switched to Plavix yesterday.     05/14/24 Cr 1.4 improved. SOB better and no chest pain. VSS

## 2024-05-10 NOTE — ASSESSMENT & PLAN NOTE
Patient with known CAD s/p stent placement and CABG, which is uncontrolled Will continue  Brilinta, Ranexa  and monitor for S/Sx of angina/ACS. Continue to monitor on telemetry.     Patient had recent heart catheterization and stents placed to LAD, left circumflex and left main on April 16, 2024 at [place]  At that time she was taken off of Plavix and started on Brilinta as well as Ranexa and Entresto  Consult cardiology  Continue home medications - Brilinta, Ranexa  Entresto held due to elevated Crit 1.7  Cardiac monitoring  PRN morphine ordered  She is also complaining of pain radiating into left neck - -will try muscle relaxant as well  Not on statin due to statin intolerance  Will add low dose nitropatch if BP tolerates it  Additional dose of IV lasix ordered for today 5/10

## 2024-05-10 NOTE — ASSESSMENT & PLAN NOTE
-Presents with SOB/LINN and stabbing heavy CP  -Recent complex PCI of LM, LAD, and LCX at [place] on 4/16/24  -Serial troponin negative  -TTE with normal EF  -Continue ASA, Brilinta, BB, Ranexa  -Will consider addition of nitrates    5/10/24  -SOB improved, will give additional dose of IV Lasix  -CP symptoms also improved  -Continue ASA, Brilinta, BB, Ranexa  -Will plan to add low dose nitropatch IF BP permits

## 2024-05-10 NOTE — ASSESSMENT & PLAN NOTE
Chronic, controlled. Latest blood pressure and vitals reviewed-     Temp:  [97.5 °F (36.4 °C)-98.9 °F (37.2 °C)]   Pulse:  [57-68]   Resp:  [15-20]   BP: (100-128)/(48-58)   SpO2:  [97 %-99 %] .   Home meds for hypertension were reviewed and noted below.   Hypertension Medications               amLODIPine (NORVASC) 5 MG tablet TAKE 1 TABLET BY MOUTH ONCE DAILY    metoprolol succinate (TOPROL XL) 50 MG 24 hr tablet 1 and 1/2 in the AM.  1 and 1/2 at night.    nitroGLYCERIN (NITROSTAT) 0.4 MG SL tablet USE AS DIRECTED    chlorthalidone (HYGROTEN) 25 MG Tab Take 25 mg by mouth every morning.    cloNIDine (CATAPRES) 0.2 MG tablet Take 1 tablet (0.2 mg total) by mouth as needed.    furosemide (LASIX) 20 MG tablet Take 20 mg by mouth.    hydrALAZINE (APRESOLINE) 50 MG tablet Take 50 mg by mouth 3 (three) times daily.    isosorbide mononitrate (IMDUR) 30 MG 24 hr tablet Take 30 mg by mouth every morning.    losartan (COZAAR) 50 MG tablet TAKE 1 TABLET BY MOUTH EVERY DAY    sacubitriL-valsartan (ENTRESTO) 24-26 mg per tablet Take 1 tablet by mouth 2 (two) times daily.         -- Home medication list needs to be updated as several of these medications were discontinued recently including chlorthalidone, hydralazine, and Imdur      While in the hospital, will manage blood pressure as follows; continue home medications which were recommended at discharge several weeks ago    Will utilize p.r.n. blood pressure medication only if patient's blood pressure greater than 180/110 and she develops symptoms such as worsening chest pain or shortness of breath.    -BP labile  -Continue BB as tolerated  -Hold Entresto given bumped creatinine

## 2024-05-10 NOTE — SUBJECTIVE & OBJECTIVE
Review of Systems   Constitutional: Positive for malaise/fatigue.   HENT: Negative.     Eyes: Negative.    Cardiovascular:  Positive for chest pain (improved but still intermittent) and dyspnea on exertion (improved).   Respiratory:  Positive for shortness of breath (improved).    Endocrine: Negative.    Hematologic/Lymphatic: Negative.    Skin: Negative.    Musculoskeletal:  Positive for arthritis and joint pain.   Gastrointestinal: Negative.    Genitourinary: Negative.    Neurological: Negative.    Psychiatric/Behavioral: Negative.     Allergic/Immunologic: Negative.      Objective:     Vital Signs (Most Recent):  Temp: 97.5 °F (36.4 °C) (05/10/24 1145)  Pulse: 61 (05/10/24 1145)  Resp: 18 (05/10/24 1145)  BP: (!) 111/51 (05/10/24 1145)  SpO2: 97 % (05/10/24 1145) Vital Signs (24h Range):  Temp:  [97.5 °F (36.4 °C)-98.9 °F (37.2 °C)] 97.5 °F (36.4 °C)  Pulse:  [57-68] 61  Resp:  [15-20] 18  SpO2:  [97 %-99 %] 97 %  BP: (100-128)/(48-58) 111/51     Weight: 68.9 kg (152 lb)  Body mass index is 26.93 kg/m².     SpO2: 97 %         Intake/Output Summary (Last 24 hours) at 5/10/2024 1228  Last data filed at 5/10/2024 0823  Gross per 24 hour   Intake 240 ml   Output 11 ml   Net 229 ml       Lines/Drains/Airways       Peripheral Intravenous Line  Duration                  Peripheral IV - Single Lumen 05/10/24 1055 22 G Left;Posterior Forearm <1 day                       Physical Exam  Vitals and nursing note reviewed.   Constitutional:       General: She is not in acute distress.     Appearance: Normal appearance. She is well-developed. She is not diaphoretic.   HENT:      Head: Normocephalic and atraumatic.   Eyes:      General:         Right eye: No discharge.         Left eye: No discharge.      Pupils: Pupils are equal, round, and reactive to light.   Cardiovascular:      Rate and Rhythm: Normal rate and regular rhythm.      Heart sounds: Normal heart sounds, S1 normal and S2 normal. No murmur heard.  Pulmonary:       Effort: Pulmonary effort is normal. No respiratory distress.      Comments: Slightly diminished at bases  Abdominal:      General: There is no distension.   Musculoskeletal:      Right lower leg: No edema.      Left lower leg: No edema.   Skin:     General: Skin is warm and dry.      Findings: No erythema.   Neurological:      General: No focal deficit present.      Mental Status: She is alert and oriented to person, place, and time.   Psychiatric:         Mood and Affect: Mood normal.         Behavior: Behavior normal.            Significant Labs: CMP   Recent Labs   Lab 05/08/24  1811 05/09/24  0540 05/10/24  0427    139 137   K 4.9 4.3 5.2*    105 107   CO2 21* 23 16*   GLU 93 96 100   BUN 31* 34* 36*   CREATININE 1.7* 1.7* 1.7*   CALCIUM 9.9 9.2 9.2   PROT 8.2 7.4 8.1   ALBUMIN 3.5 3.2* 3.5   BILITOT 0.3 0.2 0.3   ALKPHOS 91 83 96   AST 16 13 22   ALT 13 11 11   ANIONGAP 11 11 14   , CBC   Recent Labs   Lab 05/08/24  1811 05/09/24  0539 05/10/24  0427   WBC 9.58 9.13 8.39   HGB 9.6* 9.8* 11.3*   HCT 29.9* 31.0* 36.8*    338 356   , Troponin   Recent Labs   Lab 05/08/24  2246 05/09/24  0216 05/09/24  0540   TROPONINI 0.022 0.025 0.019   , and All pertinent lab results from the last 24 hours have been reviewed.    Significant Imaging: Echocardiogram: Transthoracic echo (TTE) complete ([facility] Only):   Results for orders placed or performed during the hospital encounter of 05/08/24   Echo   Result Value Ref Range    BSA 1.75 m2    LA WIDTH 3.0 cm    RA Width 2.6 cm    LVOT stroke volume 64.84 cm3    LVIDd 3.92 3.5 - 6.0 cm    LV Systolic Volume 22.35 mL    LV Systolic Volume Index 13.0 mL/m2    LVIDs 2.50 2.1 - 4.0 cm    LV Diastolic Volume 66.74 mL    LV Diastolic Volume Index 38.80 mL/m2    IVS 1.09 0.6 - 1.1 cm    LVOT diameter 1.96 cm    LVOT area 3.0 cm2    FS 36 28 - 44 %    Left Ventricle Relative Wall Thickness 0.54 cm    Posterior Wall 1.06 0.6 - 1.1 cm    LV mass 136.56 g    LV Mass  Index 79 g/m2    MV Peak E Bj 0.87 m/s    TDI LATERAL 0.07 m/s    TDI SEPTAL 0.05 m/s    E/E' ratio 14.50 m/s    MV Peak A Bj 0.93 m/s    TR Max Bj 2.14 m/s    E/A ratio 0.94     IVRT 65.65 msec    E wave deceleration time 215.38 msec    LV SEPTAL E/E' RATIO 17.40 m/s    LA Volume Index 22.1 mL/m2    LV LATERAL E/E' RATIO 12.43 m/s    LA volume 37.95 cm3    LVOT peak bj 0.98 m/s    Left Ventricular Outflow Tract Mean Velocity 0.83 cm/s    Left Ventricular Outflow Tract Mean Gradient 2.77 mmHg    RVOT peak VTI 17.2 cm    TAPSE 1.75 cm    LA size 3.17 cm    Left Atrium Minor Axis 4.67 cm    Left Atrium Major Axis 4.72 cm    RA Major Axis 3.57 cm    AV mean gradient 5 mmHg    AV peak gradient 8 mmHg    Ao peak bj 1.43 m/s    Ao VTI 24.20 cm    LVOT peak VTI 21.50 cm    AV valve area 2.68 cm²    AV Velocity Ratio 0.69     AV index (prosthetic) 0.89     YANIRA by Velocity Ratio 2.07 cm²    Mr max bj 3.28 m/s    MV stenosis pressure 1/2 time 62.46 ms    MV valve area p 1/2 method 3.52 cm2    TV mean gradient 14 mmHg    Triscuspid Valve Regurgitation Peak Gradient 18 mmHg    PV mean gradient 1 mmHg    RVOT peak bj 0.66 m/s    Ao root annulus 2.64 cm    STJ 2.57 cm    Ascending aorta 2.47 cm    IVC diameter 1.41 cm    Mean e' 0.06 m/s    ZLVIDS -1.32     ZLVIDD -1.96     EF 60 %    TV resting pulmonary artery pressure 21 mmHg    RV TB RVSP 5 mmHg    Est. RA pres 3 mmHg    Narrative      Left Ventricle: The left ventricle is normal in size. Normal wall   thickness. There is concentric remodeling. Normal wall motion. Ejection   fraction by visual approximation is 60%. There is indeterminate diastolic   function.    Right Ventricle: Normal right ventricular cavity size. Wall thickness   is normal. Right ventricle wall motion  is normal. Systolic function is   normal.    Aortic Valve: The aortic valve is a trileaflet valve. There is mild   aortic valve sclerosis.    Pulmonary Artery: The estimated pulmonary artery systolic  pressure is   21 mmHg.    IVC/SVC: Normal venous pressure at 3 mmHg.      and EKG: Reviewed

## 2024-05-10 NOTE — ASSESSMENT & PLAN NOTE
Baseline creatinine appears to be 0.81 to 1.25  Monitor renal function closely, she was given Lasix in ED due to pulmonary edema  Entresto held

## 2024-05-10 NOTE — PROGRESS NOTES
[facility] Medicine  Progress Note    Patient Name: Paola Wood  MRN: [id number]  Patient Class: IP- Inpatient   Admission Date: 5/8/2024  Length of Stay: 1 days  Attending Physician: Sapphire Stroud MD  Primary Care Provider: Grace Duron MD        Subjective:     Principal Problem:Acute chest pain        HPI:  Patient is a 73-year-old female with past medical history significant for CAD status post CABG about 2 years ago and multiple coronary stents in the past -- most recently at [facility] on 4/16/2024 with stents placed to LAD, left circumflex, and left main, diastolic congestive heart failure, hypertension, asthma, allergic rhinitis, chronic back pain, chronic dyspnea, TIA, GERD,  hyperlipidemia with statin intolerance, and chronic anemia who presented to ED with complaint of severe chest pain, rated 10/10.  She reports that she started feeling short of breath about 3 days ago and then started with upper back pain yesterday which radiate to through to her upper left chest and goes up into her left neck.  She states that the pain was intermittent, however now has become constant.   Initial vital signs in ED blood pressure 98/50, heart rate 69, afebrile, saturating 100% on room air.   Blood pressure did improve to the 130s systolic.   EKG showed normal sinus rhythm with sinus arrhythmia and nonspecific T-wave abnormality with T-wave inversion evident in lateral leads, no STEMI.  Chest x-ray showed mild perihilar interstitial hazy opacity suggestive of pulmonary edema.  Lab workup significant for hemoglobin 9.6, hematocrit 29.9, CO2 21, BUN 31, creatinine 1.7, , troponin 0.011.  ED nurses present in the room giving her morphine and Zofran at this time for her complaint of chest pain rated 9.5/10 currently. she was also given IV Lasix 60 mg while in ED. hospital medicine is consulted for admission due to chest pain.    Overview/Hospital Course:  74 y/o  female presents to ED with complaint of severe chest pain, rated 10/10.   EKG showed normal sinus rhythm with sinus arrhythmia and nonspecific T-wave abnormality with T-wave inversion evident in lateral leads, no STEMI.   Chest x-ray: showed mild perihilar interstitial hazy opacity suggestive of pulmonary edema.   Serial troponin negative   Cardiology consulted  ECHO: EF 60%, Indeterminate diastolic function and normal systolic function. PA pressure 21mmHg. Mild aortic valve sclerosis.   BP labile will continue BB as tolerated but hold Entresto given bumped creatinine. Addition dose of IV lasix 40mg given today and evaluate response in a.m. as patient continues to report severe CP and SOB.   Additional dose of IV lasix 40mg dose ordered today 5/10, Cardiology recommends addition of  low dose nitropatch IF BP permits. Will continue to assess response to medication adjustments per cardiology recommendations. Patient reports improvement in Chest pain states it is 9/10 now, will continue PRN pain medications as BP allows.     Interval History: f/u chest pain. Patient awake and alert, laying in bed. Patient reports improvement in CP 9/10 and states the pain is intermittent. Patient reports SOB worsens with exertion. Patient denies any nausea, vomiting, diarrhea, and abd pain. Cardiology recommends low dose nitropatch if patients BP tolerates it and additional dose of IV lasix today.     Review of Systems   Constitutional:  Positive for fatigue.   Respiratory:  Positive for shortness of breath.         Dyspnea on exertion    Cardiovascular:  Positive for chest pain (Reports improvement).   Musculoskeletal:  Positive for back pain and neck pain.     Objective:     Vital Signs (Most Recent):  Temp: 97.5 °F (36.4 °C) (05/10/24 1145)  Pulse: 61 (05/10/24 1145)  Resp: 18 (05/10/24 1145)  BP: (!) 111/51 (05/10/24 1145)  SpO2: 97 % (05/10/24 1145) Vital Signs (24h Range):  Temp:  [97.5 °F (36.4 °C)-98.9 °F (37.2 °C)] 97.5 °F  (36.4 °C)  Pulse:  [57-68] 61  Resp:  [15-20] 18  SpO2:  [97 %-99 %] 97 %  BP: (100-128)/(48-58) 111/51     Weight: 68.9 kg (152 lb)  Body mass index is 26.93 kg/m².    Intake/Output Summary (Last 24 hours) at 5/10/2024 1252  Last data filed at 5/10/2024 0823  Gross per 24 hour   Intake 240 ml   Output 11 ml   Net 229 ml         Physical Exam  Vitals and nursing note reviewed.   Constitutional:       General: She is not in acute distress.     Appearance: She is not ill-appearing.   HENT:      Mouth/Throat:      Mouth: Mucous membranes are moist.      Pharynx: Oropharynx is clear.   Eyes:      Pupils: Pupils are equal, round, and reactive to light.   Cardiovascular:      Rate and Rhythm: Normal rate and regular rhythm.      Heart sounds: No murmur heard.  Pulmonary:      Effort: Pulmonary effort is normal.      Breath sounds: Normal breath sounds. No wheezing.   Abdominal:      General: Bowel sounds are normal. There is no distension.      Palpations: Abdomen is soft.      Tenderness: There is no abdominal tenderness.   Musculoskeletal:         General: Normal range of motion.   Skin:     General: Skin is warm and dry.   Neurological:      General: No focal deficit present.      Mental Status: She is alert and oriented to person, place, and time.             Significant Labs: All pertinent labs within the past 24 hours have been reviewed.  Recent Lab Results         05/10/24  0427   05/09/24  1345        Albumin 3.5         ALP 96         ALT 11         Anion Gap 14         AST 22         Baso # 0.07         Basophil % 0.8         BILIRUBIN TOTAL 0.3  Comment: For infants and newborns, interpretation of results should be based  on gestational age, weight and in agreement with clinical  observations.    Premature Infant recommended reference ranges:  Up to 24 hours.............<8.0 mg/dL  Up to 48 hours............<12.0 mg/dL  3-5 days..................<15.0 mg/dL  6-29 days.................<15.0 mg/dL           BUN  36         Calcium 9.2         Chloride 107         CO2 16         Creatinine 1.7         Differential Method Automated         eGFR 32         Eos # 0.3         Eos % 3.1         Glucose 100         Gran # (ANC) 5.7         Gran % 68.3         Hematocrit 36.8         Hemoglobin 11.3         Immature Grans (Abs) 0.05  Comment: Mild elevation in immature granulocytes is non specific and   can be seen in a variety of conditions including stress response,   acute inflammation, trauma and pregnancy. Correlation with other   laboratory and clinical findings is essential.           Immature Granulocytes 0.6         Lymph # 1.4         Lymph % 16.6         Magnesium  2.7         MCH 28.7         MCHC 30.7         MCV 93         Mono # 0.9         Mono % 10.6         MPV 9.3         nRBC 0         QRS Duration   86       OHS QTC Calculation   428       Platelet Count 356         Potassium 5.2         PROTEIN TOTAL 8.1         RBC 3.94         RDW 14.6         Sodium 137         WBC 8.39                 Significant Imaging: I have reviewed all pertinent imaging results/findings within the past 24 hours.    Assessment/Plan:      * Acute chest pain  Acute worsening of what sounds like chronic chest pain  She was recently started on Entresto, Ranexa, and Brilinta after stents placed to LAD, left circumflex and left main at [place]  Morphine ordered PRN  Trending troponins, initial troponin normal  Cardiac monitoring ordered  Cardiology consult   Continue ASA, Brilinta, BB, Ranexa  Will consider addition of nitrates on 5/9   Addition of low dose nitropatch if BP tolerates it 5/10    LINN (dyspnea on exertion)  Assess response to IV diuresis     Acute on chronic diastolic congestive heart failure  Patient is identified as having Diastolic (HFpEF) heart failure that is Acute on chronic. CHF is currently controlled. Latest ECHO performed and demonstrates- No results found for this or any previous visit.  . Continue Beta Blocker  and Furosemide and monitor clinical status closely. Monitor on telemetry. Patient is off CHF pathway.  Monitor strict Is&Os and daily weights.  Place on fluid restriction of 1.5 L. Cardiology has been consulted. Continue to stress to patient importance of self efficacy and  on diet for CHF. Last BNP reviewed- and noted below   Recent Labs   Lab 05/08/24  1811   *       Monitor fluid/volume status  IV Lasix given per ED due to pulmonary edema noted on CXR  Recent heart catheterization -- LV EF 60%  BNP mildly elevated and CXR concerning for pulmonary edema  Additional dose of IV Lasix today  Continue BB as tolerated  Hold Entresto given LINDSAY  TTE with normal EF    Acute pulmonary edema  Lasix 60 mg IV given per ED  Monitor respiratory status and CXR  Additional dose of Lasix 40mg IV ordered for 5/9  Additional dose of Lasix 40mg IV ordered for 5/10    LINDSAY (acute kidney injury)  Baseline creatinine appears to be 0.81 to 1.25  Monitor renal function closely, she was given Lasix in ED due to pulmonary edema  Entresto held     Chronic pain  Continue PRN Percocet -- she sees pain management      Generalized anxiety disorder  Not currently on medication, she does not appear particularly anxious at this time, however it could be contributing to her symptoms      Hypertension  Chronic, controlled. Latest blood pressure and vitals reviewed-     Temp:  [97.5 °F (36.4 °C)-98.9 °F (37.2 °C)]   Pulse:  [57-68]   Resp:  [15-20]   BP: (100-128)/(48-58)   SpO2:  [97 %-99 %] .   Home meds for hypertension were reviewed and noted below.   Hypertension Medications               amLODIPine (NORVASC) 5 MG tablet TAKE 1 TABLET BY MOUTH ONCE DAILY    metoprolol succinate (TOPROL XL) 50 MG 24 hr tablet 1 and 1/2 in the AM.  1 and 1/2 at night.    nitroGLYCERIN (NITROSTAT) 0.4 MG SL tablet USE AS DIRECTED    chlorthalidone (HYGROTEN) 25 MG Tab Take 25 mg by mouth every morning.    cloNIDine (CATAPRES) 0.2 MG tablet Take 1 tablet  (0.2 mg total) by mouth as needed.    furosemide (LASIX) 20 MG tablet Take 20 mg by mouth.    hydrALAZINE (APRESOLINE) 50 MG tablet Take 50 mg by mouth 3 (three) times daily.    isosorbide mononitrate (IMDUR) 30 MG 24 hr tablet Take 30 mg by mouth every morning.    losartan (COZAAR) 50 MG tablet TAKE 1 TABLET BY MOUTH EVERY DAY    sacubitriL-valsartan (ENTRESTO) 24-26 mg per tablet Take 1 tablet by mouth 2 (two) times daily.         -- Home medication list needs to be updated as several of these medications were discontinued recently including chlorthalidone, hydralazine, and Imdur      While in the hospital, will manage blood pressure as follows; continue home medications which were recommended at discharge several weeks ago    Will utilize p.r.n. blood pressure medication only if patient's blood pressure greater than 180/110 and she develops symptoms such as worsening chest pain or shortness of breath.    -BP labile  -Continue BB as tolerated  -Hold Entresto given bumped creatinine    Generalized convulsive epilepsy  No recent seizures, has not been on antiepileptics      Hyperlipidemia  Intolerant of statins        Acid reflux  Continue PPI      Coronary artery disease of bypass graft of native heart with stable angina pectoris  Patient with known CAD s/p stent placement and CABG, which is uncontrolled Will continue  Brilinta, Ranexa  and monitor for S/Sx of angina/ACS. Continue to monitor on telemetry.     Patient had recent heart catheterization and stents placed to LAD, left circumflex and left main on April 16, 2024 at [place]  At that time she was taken off of Plavix and started on Brilinta as well as Ranexa and Entresto  Consult cardiology  Continue home medications - Brilinta, Ranexa  Entresto held due to elevated Crit 1.7  Cardiac monitoring  PRN morphine ordered  She is also complaining of pain radiating into left neck - -will try muscle relaxant as well  Not on statin due to statin intolerance  Will  add low dose nitropatch if BP tolerates it  Additional dose of IV lasix ordered for today 5/10    Anemia in chronic illness  Patient's anemia is currently controlled. Has not received any PRBCs to date.   Current CBC reviewed-   Lab Results   Component Value Date    HGB 11.3 (L) 05/10/2024    HCT 36.8 (L) 05/10/2024     Monitor serial CBC and transfuse if patient becomes hemodynamically unstable, symptomatic or H/H drops below 7/21.      VTE Risk Mitigation (From admission, onward)           Ordered     IP VTE HIGH RISK PATIENT  Once         05/08/24 1957     Place sequential compression device  Until discontinued         05/08/24 1957                    Discharge Planning   SHAD:      Code Status: Full Code   Is the patient medically ready for discharge?:     Reason for patient still in hospital (select all that apply): Treatment and Consult recommendations  Discharge Plan A: Home with family        Patient seen and care plan discussed with attending physician          Bebe Martinez NP  Department of Hospital Medicine   [facility] - Telemetry ([facility])

## 2024-05-10 NOTE — ASSESSMENT & PLAN NOTE
Patient's anemia is currently controlled. Has not received any PRBCs to date.   Current CBC reviewed-   Lab Results   Component Value Date    HGB 11.3 (L) 05/10/2024    HCT 36.8 (L) 05/10/2024     Monitor serial CBC and transfuse if patient becomes hemodynamically unstable, symptomatic or H/H drops below 7/21.

## 2024-05-11 LAB
ALBUMIN SERPL BCP-MCNC: 3.2 G/DL (ref 3.5–5.2)
ALP SERPL-CCNC: 92 U/L (ref 55–135)
ALT SERPL W/O P-5'-P-CCNC: 8 U/L (ref 10–44)
ANION GAP SERPL CALC-SCNC: 14 MMOL/L (ref 8–16)
AST SERPL-CCNC: 13 U/L (ref 10–40)
BASOPHILS # BLD AUTO: 0.03 K/UL (ref 0–0.2)
BASOPHILS NFR BLD: 0.3 % (ref 0–1.9)
BILIRUB SERPL-MCNC: 0.3 MG/DL (ref 0.1–1)
BUN SERPL-MCNC: 31 MG/DL (ref 8–23)
CALCIUM SERPL-MCNC: 9.3 MG/DL (ref 8.7–10.5)
CHLORIDE SERPL-SCNC: 105 MMOL/L (ref 95–110)
CO2 SERPL-SCNC: 21 MMOL/L (ref 23–29)
CREAT SERPL-MCNC: 1.6 MG/DL (ref 0.5–1.4)
DIFFERENTIAL METHOD BLD: ABNORMAL
EOSINOPHIL # BLD AUTO: 0.2 K/UL (ref 0–0.5)
EOSINOPHIL NFR BLD: 1.4 % (ref 0–8)
ERYTHROCYTE [DISTWIDTH] IN BLOOD BY AUTOMATED COUNT: 14.5 % (ref 11.5–14.5)
EST. GFR  (NO RACE VARIABLE): 34 ML/MIN/1.73 M^2
GLUCOSE SERPL-MCNC: 107 MG/DL (ref 70–110)
HCT VFR BLD AUTO: 31.4 % (ref 37–48.5)
HGB BLD-MCNC: 10.5 G/DL (ref 12–16)
IMM GRANULOCYTES # BLD AUTO: 0.05 K/UL (ref 0–0.04)
IMM GRANULOCYTES NFR BLD AUTO: 0.5 % (ref 0–0.5)
LYMPHOCYTES # BLD AUTO: 1.1 K/UL (ref 1–4.8)
LYMPHOCYTES NFR BLD: 10.2 % (ref 18–48)
MAGNESIUM SERPL-MCNC: 2.5 MG/DL (ref 1.6–2.6)
MCH RBC QN AUTO: 28.8 PG (ref 27–31)
MCHC RBC AUTO-ENTMCNC: 33.4 G/DL (ref 32–36)
MCV RBC AUTO: 86 FL (ref 82–98)
MONOCYTES # BLD AUTO: 0.9 K/UL (ref 0.3–1)
MONOCYTES NFR BLD: 8.7 % (ref 4–15)
NEUTROPHILS # BLD AUTO: 8.2 K/UL (ref 1.8–7.7)
NEUTROPHILS NFR BLD: 78.9 % (ref 38–73)
NRBC BLD-RTO: 0 /100 WBC
PLATELET # BLD AUTO: 322 K/UL (ref 150–450)
PMV BLD AUTO: 9.2 FL (ref 9.2–12.9)
POTASSIUM SERPL-SCNC: 3.9 MMOL/L (ref 3.5–5.1)
PROT SERPL-MCNC: 7.8 G/DL (ref 6–8.4)
RBC # BLD AUTO: 3.64 M/UL (ref 4–5.4)
SODIUM SERPL-SCNC: 140 MMOL/L (ref 136–145)
TROPONIN I SERPL DL<=0.01 NG/ML-MCNC: 0.02 NG/ML (ref 0–0.03)
WBC # BLD AUTO: 10.44 K/UL (ref 3.9–12.7)

## 2024-05-11 PROCEDURE — 63600175 PHARM REV CODE 636 W HCPCS: Performed by: INTERNAL MEDICINE

## 2024-05-11 PROCEDURE — 83735 ASSAY OF MAGNESIUM: CPT | Performed by: NURSE PRACTITIONER

## 2024-05-11 PROCEDURE — 25000003 PHARM REV CODE 250: Performed by: INTERNAL MEDICINE

## 2024-05-11 PROCEDURE — 93010 ELECTROCARDIOGRAM REPORT: CPT | Mod: ,,, | Performed by: STUDENT IN AN ORGANIZED HEALTH CARE EDUCATION/TRAINING PROGRAM

## 2024-05-11 PROCEDURE — 21400001 HC TELEMETRY ROOM

## 2024-05-11 PROCEDURE — 99233 SBSQ HOSP IP/OBS HIGH 50: CPT | Mod: ,,, | Performed by: STUDENT IN AN ORGANIZED HEALTH CARE EDUCATION/TRAINING PROGRAM

## 2024-05-11 PROCEDURE — A4216 STERILE WATER/SALINE, 10 ML: HCPCS | Performed by: NURSE PRACTITIONER

## 2024-05-11 PROCEDURE — 93005 ELECTROCARDIOGRAM TRACING: CPT

## 2024-05-11 PROCEDURE — 85025 COMPLETE CBC W/AUTO DIFF WBC: CPT | Performed by: NURSE PRACTITIONER

## 2024-05-11 PROCEDURE — 63600175 PHARM REV CODE 636 W HCPCS: Performed by: STUDENT IN AN ORGANIZED HEALTH CARE EDUCATION/TRAINING PROGRAM

## 2024-05-11 PROCEDURE — 25000003 PHARM REV CODE 250: Performed by: NURSE PRACTITIONER

## 2024-05-11 PROCEDURE — 25000003 PHARM REV CODE 250: Performed by: PHYSICIAN ASSISTANT

## 2024-05-11 PROCEDURE — 80053 COMPREHEN METABOLIC PANEL: CPT | Performed by: NURSE PRACTITIONER

## 2024-05-11 PROCEDURE — 84484 ASSAY OF TROPONIN QUANT: CPT

## 2024-05-11 PROCEDURE — 25000003 PHARM REV CODE 250: Performed by: STUDENT IN AN ORGANIZED HEALTH CARE EDUCATION/TRAINING PROGRAM

## 2024-05-11 PROCEDURE — 36415 COLL VENOUS BLD VENIPUNCTURE: CPT | Performed by: NURSE PRACTITIONER

## 2024-05-11 RX ORDER — FUROSEMIDE 10 MG/ML
40 INJECTION INTRAMUSCULAR; INTRAVENOUS 2 TIMES DAILY
Status: DISCONTINUED | OUTPATIENT
Start: 2024-05-11 | End: 2024-05-12

## 2024-05-11 RX ADMIN — METOPROLOL SUCCINATE 25 MG: 25 TABLET, FILM COATED, EXTENDED RELEASE ORAL at 08:05

## 2024-05-11 RX ADMIN — RANOLAZINE 500 MG: 500 TABLET, EXTENDED RELEASE ORAL at 08:05

## 2024-05-11 RX ADMIN — OXYCODONE AND ACETAMINOPHEN 1 TABLET: 10; 325 TABLET ORAL at 11:05

## 2024-05-11 RX ADMIN — Medication 3 ML: at 10:05

## 2024-05-11 RX ADMIN — TICAGRELOR 90 MG: 90 TABLET ORAL at 08:05

## 2024-05-11 RX ADMIN — POLYETHYLENE GLYCOL 3350 17 G: 17 POWDER, FOR SOLUTION ORAL at 04:05

## 2024-05-11 RX ADMIN — MORPHINE SULFATE 2 MG: 4 INJECTION INTRAVENOUS at 04:05

## 2024-05-11 RX ADMIN — FUROSEMIDE 40 MG: 10 INJECTION, SOLUTION INTRAMUSCULAR; INTRAVENOUS at 01:05

## 2024-05-11 RX ADMIN — ASPIRIN 81 MG: 81 TABLET, COATED ORAL at 08:05

## 2024-05-11 RX ADMIN — AMITRIPTYLINE HYDROCHLORIDE 150 MG: 50 TABLET, FILM COATED ORAL at 08:05

## 2024-05-11 RX ADMIN — GEMFIBROZIL 600 MG: 600 TABLET ORAL at 03:05

## 2024-05-11 RX ADMIN — NITROGLYCERIN 0.5 INCH: 20 OINTMENT TOPICAL at 08:05

## 2024-05-11 RX ADMIN — PANTOPRAZOLE SODIUM 40 MG: 40 TABLET, DELAYED RELEASE ORAL at 08:05

## 2024-05-11 RX ADMIN — GEMFIBROZIL 600 MG: 600 TABLET ORAL at 05:05

## 2024-05-11 RX ADMIN — Medication 3 ML: at 05:05

## 2024-05-11 RX ADMIN — FUROSEMIDE 40 MG: 10 INJECTION, SOLUTION INTRAMUSCULAR; INTRAVENOUS at 08:05

## 2024-05-11 RX ADMIN — Medication 3 ML: at 01:05

## 2024-05-11 NOTE — ASSESSMENT & PLAN NOTE
Patient's anemia is currently controlled. Has not received any PRBCs to date.   Current CBC reviewed-   Lab Results   Component Value Date    HGB 10.5 (L) 05/11/2024    HCT 31.4 (L) 05/11/2024     Monitor serial CBC and transfuse if patient becomes hemodynamically unstable, symptomatic or H/H drops below 7/21.

## 2024-05-11 NOTE — ASSESSMENT & PLAN NOTE
Lasix 60 mg IV given per ED  Monitor respiratory status and CXR  Additional dose of Lasix 40mg IV ordered for 5/9  Additional dose of Lasix 40mg IV ordered for 5/10  BID IV Lasix 40mg IV on 5/11 per cardiology recommendations will evaluate response and adjust as needed

## 2024-05-11 NOTE — ASSESSMENT & PLAN NOTE
Patient with known CAD s/p stent placement and CABG, which is uncontrolled Will continue  Brilinta, Ranexa  and monitor for S/Sx of angina/ACS. Continue to monitor on telemetry.     Patient had recent heart catheterization and stents placed to LAD, left circumflex and left main on April 16, 2024 at [place]  At that time she was taken off of Plavix and started on Brilinta as well as Ranexa and Entresto  Consult cardiology  Continue home medications - Brilinta, Ranexa  Entresto held due to elevated Crit 1.7  Cardiac monitoring  PRN morphine ordered  She is also complaining of pain radiating into left neck - -will try muscle relaxant as well  Not on statin due to statin intolerance  Will add low dose nitropatch if BP tolerates it  Additional dose of IV lasix ordered for today 5/10  IV lasix 40mg BID per cardiology, will evaluate response

## 2024-05-11 NOTE — PLAN OF CARE
A205/A205 PAULeonor Wood is a 73 y.o.female admitted on 5/8/2024 for Acute chest pain   Code Status: Full Code MRN: [id number]   Review of patient's allergies indicates:   Allergen Reactions    Ace inhibitors Swelling     Other reaction(s): tongue swelling  Other reaction(s): Other (See Comments)  Other reaction(s): tongue swelling      Bacitracin     Ezetimibe     Lisinopril      Other reaction(s): Not available    Neomycin     Niacin     Polymyxin b     Atorvastatin Rash    Rosuvastatin Nausea And Vomiting    Statins-hmg-coa reductase inhibitors Nausea And Vomiting     Other reaction(s): Not available     Past Medical History:   Diagnosis Date    Allergy     AR (allergic rhinitis)     Asthma     CAD (coronary artery disease)     CHF (congestive heart failure)     Chronic pain     neck/back/arm/knee    GERD (gastroesophageal reflux disease)     Hypertension     Seizures     SOB (shortness of breath)     TIA (transient ischemic attack)       PRN meds    acetaminophen, 650 mg, Q4H PRN  aluminum-magnesium hydroxide-simethicone, 30 mL, QID PRN  dextrose 10%, 12.5 g, PRN  dextrose 10%, 25 g, PRN  glucagon (human recombinant), 1 mg, PRN  glucose, 16 g, PRN  glucose, 24 g, PRN  melatonin, 6 mg, Nightly PRN  morphine, 2 mg, Q4H PRN  naloxone, 0.02 mg, PRN  ondansetron, 4 mg, Q6H PRN  oxyCODONE-acetaminophen, 1 tablet, Q6H PRN  polyethylene glycol, 17 g, Daily PRN  promethazine, 25 mg, Q6H PRN      Chart check completed. Will continue plan of care.         Ronda Coma Scale Score: 15     Lead Monitored: Lead II Rhythm: normal sinus rhythm Frequency/Ectopy: PACs  Cardiac/Telemetry Box Number: [id number]  VTE Required Core Measure: Patient refused interventions (education provided) Last Bowel Movement: 05/08/24  Diet Cardiac     Jassi Score: 20  Fall Risk Score: 10  Accucheck []   Freq?      Lines/Drains/Airways       Peripheral Intravenous Line  Duration                  Peripheral IV - Single Lumen 05/10/24 1055 22 G  Left;Posterior Forearm 1 day                       Problem: Adult Inpatient Plan of Care  Goal: Plan of Care Review  5/11/2024 1706 by Prasanna Fernandez RN  Outcome: Progressing  5/11/2024 1705 by Prasanna Fernandez RN  Outcome: Progressing  Goal: Patient-Specific Goal (Individualized)  5/11/2024 1706 by Prasanna Fernandez RN  Outcome: Progressing  5/11/2024 1705 by Prasanna Fernandez RN  Outcome: Progressing  Goal: Absence of Hospital-Acquired Illness or Injury  5/11/2024 1706 by Prasanna Fernandez RN  Outcome: Progressing  5/11/2024 1705 by Prasanna Fernandez RN  Outcome: Progressing  Goal: Optimal Comfort and Wellbeing  5/11/2024 1706 by Prasanna Fernandez RN  Outcome: Progressing  5/11/2024 1705 by Prasanna Fernandez RN  Outcome: Progressing  Goal: Readiness for Transition of Care  5/11/2024 1706 by Prasanna Fernandez RN  Outcome: Progressing  5/11/2024 1705 by Prasanna Fernandez RN  Outcome: Progressing     Problem: Infection  Goal: Absence of Infection Signs and Symptoms  5/11/2024 1706 by Prasanna Fernandez RN  Outcome: Progressing  5/11/2024 1705 by Prasanna Fernandez RN  Outcome: Progressing     Problem: Acute Kidney Injury/Impairment  Goal: Fluid and Electrolyte Balance  5/11/2024 1706 by Prasanna Fernandez RN  Outcome: Progressing  5/11/2024 1705 by Prasanna Fernandez RN  Outcome: Progressing  Goal: Improved Oral Intake  5/11/2024 1706 by Parsanna Fernandez RN  Outcome: Progressing  5/11/2024 1705 by Prasanna Fernandez RN  Outcome: Progressing  Goal: Effective Renal Function  5/11/2024 1706 by Prasanna Fernandez RN  Outcome: Progressing  5/11/2024 1705 by Prasanna Fernandez RN  Outcome: Progressing     Problem: Fall Injury Risk  Goal: Absence of Fall and Fall-Related Injury  5/11/2024 1706 by Prasanna Fernandez RN  Outcome: Progressing  5/11/2024 1705 by Prasanna Fernandez RN  Outcome: Progressing     Problem: Chest Pain  Goal: Resolution of Chest Pain Symptoms  5/11/2024 1706 by Prasanna Fernandez, RN  Outcome: Not Progressing  5/11/2024 1705 by  Prasanna Fernandez, RN  Outcome: Progressing

## 2024-05-11 NOTE — ASSESSMENT & PLAN NOTE
Chronic, controlled. Latest blood pressure and vitals reviewed-     Temp:  [98 °F (36.7 °C)-99 °F (37.2 °C)]   Pulse:  [60-77]   Resp:  [18-20]   BP: (111-132)/(53-63)   SpO2:  [97 %-100 %] .   Home meds for hypertension were reviewed and noted below.   Hypertension Medications               amLODIPine (NORVASC) 5 MG tablet TAKE 1 TABLET BY MOUTH ONCE DAILY    metoprolol succinate (TOPROL XL) 50 MG 24 hr tablet 1 and 1/2 in the AM.  1 and 1/2 at night.    nitroGLYCERIN (NITROSTAT) 0.4 MG SL tablet USE AS DIRECTED    chlorthalidone (HYGROTEN) 25 MG Tab Take 25 mg by mouth every morning.    cloNIDine (CATAPRES) 0.2 MG tablet Take 1 tablet (0.2 mg total) by mouth as needed.    furosemide (LASIX) 20 MG tablet Take 20 mg by mouth.    hydrALAZINE (APRESOLINE) 50 MG tablet Take 50 mg by mouth 3 (three) times daily.    isosorbide mononitrate (IMDUR) 30 MG 24 hr tablet Take 30 mg by mouth every morning.    losartan (COZAAR) 50 MG tablet TAKE 1 TABLET BY MOUTH EVERY DAY    sacubitriL-valsartan (ENTRESTO) 24-26 mg per tablet Take 1 tablet by mouth 2 (two) times daily.         -- Home medication list needs to be updated as several of these medications were discontinued recently including chlorthalidone, hydralazine, and Imdur      While in the hospital, will manage blood pressure as follows; continue home medications which were recommended at discharge several weeks ago    Will utilize p.r.n. blood pressure medication only if patient's blood pressure greater than 180/110 and she develops symptoms such as worsening chest pain or shortness of breath.    -BP labile  -Continue BB as tolerated  -Hold Entresto given bumped creatinine

## 2024-05-11 NOTE — ASSESSMENT & PLAN NOTE
Acute worsening of what sounds like chronic chest pain  She was recently started on Entresto, Ranexa, and Brilinta after stents placed to LAD, left circumflex and left main at [place]  Morphine ordered PRN  Trending troponins, initial troponin normal  Cardiac monitoring ordered  Cardiology consult   Continue ASA, Brilinta, BB, Ranexa  Will consider addition of nitrates on 5/9   Addition of low dose nitropatch if BP tolerates it 5/10

## 2024-05-11 NOTE — PLAN OF CARE
Problem: Infection  Goal: Absence of Infection Signs and Symptoms  Outcome: Progressing     Problem: Adult Inpatient Plan of Care  Goal: Absence of Hospital-Acquired Illness or Injury  Outcome: Progressing     Problem: Adult Inpatient Plan of Care  Goal: Plan of Care Review  Outcome: Progressing     Problem: Acute Kidney Injury/Impairment  Goal: Improved Oral Intake  Outcome: Progressing

## 2024-05-11 NOTE — PROGRESS NOTES
[facility] Medicine  Progress Note    Patient Name: Paola Wood  MRN: [id number]  Patient Class: IP- Inpatient   Admission Date: 5/8/2024  Length of Stay: 2 days  Attending Physician: Sapphire Stroud MD  Primary Care Provider: Grace Duron MD        Subjective:     Principal Problem:Acute chest pain        HPI:  Patient is a 73-year-old female with past medical history significant for CAD status post CABG about 2 years ago and multiple coronary stents in the past -- most recently at [place] on 4/16/2024 with stents placed to LAD, left circumflex, and left main, diastolic congestive heart failure, hypertension, asthma, allergic rhinitis, chronic back pain, chronic dyspnea, TIA, GERD,  hyperlipidemia with statin intolerance, and chronic anemia who presented to ED with complaint of severe chest pain, rated 10/10.  She reports that she started feeling short of breath about 3 days ago and then started with upper back pain yesterday which radiate to through to her upper left chest and goes up into her left neck.  She states that the pain was intermittent, however now has become constant.   Initial vital signs in ED blood pressure 98/50, heart rate 69, afebrile, saturating 100% on room air.   Blood pressure did improve to the 130s systolic.   EKG showed normal sinus rhythm with sinus arrhythmia and nonspecific T-wave abnormality with T-wave inversion evident in lateral leads, no STEMI.  Chest x-ray showed mild perihilar interstitial hazy opacity suggestive of pulmonary edema.  Lab workup significant for hemoglobin 9.6, hematocrit 29.9, CO2 21, BUN 31, creatinine 1.7, , troponin 0.011.  ED nurses present in the room giving her morphine and Zofran at this time for her complaint of chest pain rated 9.5/10 currently. she was also given IV Lasix 60 mg while in ED. hospital medicine is consulted for admission due to chest pain.    Overview/Hospital Course:  74 y/o  female presents to ED with complaint of severe chest pain, rated 10/10.   EKG showed normal sinus rhythm with sinus arrhythmia and nonspecific T-wave abnormality with T-wave inversion evident in lateral leads, no STEMI.   Chest x-ray: showed mild perihilar interstitial hazy opacity suggestive of pulmonary edema.   Serial troponin negative   Cardiology consulted  ECHO: EF 60%, Indeterminate diastolic function and normal systolic function. PA pressure 21mmHg. Mild aortic valve sclerosis.   BP labile will continue BB as tolerated but hold Entresto given bumped creatinine. Addition dose of IV lasix 40mg given today and evaluate response in a.m. as patient continues to report severe CP and SOB.   Additional dose of IV lasix 40mg dose ordered today 5/10, Cardiology recommends addition of  low dose nitropatch IF BP permits. Will continue to assess response to medication adjustments per cardiology recommendations. Patient reports improvement in Chest pain states it is 9/10 now, will continue PRN pain medications as BP allows.   Cardiology ordered BID Lasix 40mg IV BID as the patient is continuing to report SOB.         Interval History: f/u chest pain. Patient is awake and alert, laying in bed. Patient reports mild improvement in CP, rates 8/10. States the pain comes and goes. Denies any nausea, vomiting, diarrhea and abd pain. Patient continues to report significant shortness of breath. Cardiology ordered 40mg Lasix IV BID.     Review of Systems   Constitutional:  Positive for fatigue.   HENT: Negative.     Respiratory:  Positive for shortness of breath.    Cardiovascular:  Positive for chest pain (Mild improvement).   Gastrointestinal: Negative.    Genitourinary: Negative.    Musculoskeletal: Negative.    Skin: Negative.      Objective:     Vital Signs (Most Recent):  Temp: 98.8 °F (37.1 °C) (05/11/24 1140)  Pulse: 65 (05/11/24 1330)  Resp: 18 (05/11/24 1140)  BP: (!) 117/54 (05/11/24 1140)  SpO2: 98 % (05/11/24 1140)  Vital Signs (24h Range):  Temp:  [98 °F (36.7 °C)-99 °F (37.2 °C)] 98.8 °F (37.1 °C)  Pulse:  [60-77] 65  Resp:  [18-20] 18  SpO2:  [97 %-100 %] 98 %  BP: (111-132)/(53-63) 117/54     Weight: 69.8 kg (153 lb 14.1 oz)  Body mass index is 27.26 kg/m².    Intake/Output Summary (Last 24 hours) at 5/11/2024 1437  Last data filed at 5/10/2024 1808  Gross per 24 hour   Intake 240 ml   Output 2 ml   Net 238 ml         Physical Exam  Vitals and nursing note reviewed.   Constitutional:       General: She is not in acute distress.     Appearance: She is not ill-appearing.   HENT:      Mouth/Throat:      Mouth: Mucous membranes are moist.      Pharynx: Oropharynx is clear.   Eyes:      Pupils: Pupils are equal, round, and reactive to light.   Cardiovascular:      Rate and Rhythm: Normal rate and regular rhythm.      Heart sounds: No murmur heard.  Pulmonary:      Effort: Pulmonary effort is normal.      Breath sounds: Normal breath sounds. No wheezing.   Abdominal:      General: Bowel sounds are normal. There is no distension.      Palpations: Abdomen is soft.      Tenderness: There is no abdominal tenderness.   Musculoskeletal:         General: Normal range of motion.   Skin:     General: Skin is warm and dry.   Neurological:      General: No focal deficit present.      Mental Status: She is alert and oriented to person, place, and time.             Significant Labs: All pertinent labs within the past 24 hours have been reviewed.  Recent Lab Results         05/11/24  0647        Albumin 3.2       ALP 92       ALT 8       Anion Gap 14       AST 13       Baso # 0.03       Basophil % 0.3       BILIRUBIN TOTAL 0.3  Comment: For infants and newborns, interpretation of results should be based  on gestational age, weight and in agreement with clinical  observations.    Premature Infant recommended reference ranges:  Up to 24 hours.............<8.0 mg/dL  Up to 48 hours............<12.0 mg/dL  3-5 days..................<15.0  mg/dL  6-29 days.................<15.0 mg/dL         BUN 31       Calcium 9.3       Chloride 105       CO2 21       Creatinine 1.6       Differential Method Automated       eGFR 34       Eos # 0.2       Eos % 1.4       Glucose 107       Gran # (ANC) 8.2       Gran % 78.9       Hematocrit 31.4       Hemoglobin 10.5       Immature Grans (Abs) 0.05  Comment: Mild elevation in immature granulocytes is non specific and   can be seen in a variety of conditions including stress response,   acute inflammation, trauma and pregnancy. Correlation with other   laboratory and clinical findings is essential.         Immature Granulocytes 0.5       Lymph # 1.1       Lymph % 10.2       Magnesium  2.5       MCH 28.8       MCHC 33.4       MCV 86  Comment: Reviewed by Technologist.       Mono # 0.9       Mono % 8.7       MPV 9.2       nRBC 0       Platelet Count 322       Potassium 3.9       PROTEIN TOTAL 7.8       RBC 3.64       RDW 14.5       Sodium 140       WBC 10.44               Significant Imaging: I have reviewed all pertinent imaging results/findings within the past 24 hours.    Assessment/Plan:      * Acute chest pain  Acute worsening of what sounds like chronic chest pain  She was recently started on Entresto, Ranexa, and Brilinta after stents placed to LAD, left circumflex and left main at [place]  Morphine ordered PRN  Trending troponins, initial troponin normal  Cardiac monitoring ordered  Cardiology consult   Continue ASA, Brilinta, BB, Ranexa  Will consider addition of nitrates on 5/9   Addition of low dose nitropatch if BP tolerates it 5/10    LINN (dyspnea on exertion)  Assess response to IV diuresis     Acute on chronic diastolic congestive heart failure  Patient is identified as having Diastolic (HFpEF) heart failure that is Acute on chronic. CHF is currently controlled. Latest ECHO performed and demonstrates- No results found for this or any previous visit.  . Continue Beta Blocker and Furosemide and monitor  clinical status closely. Monitor on telemetry. Patient is off CHF pathway.  Monitor strict Is&Os and daily weights.  Place on fluid restriction of 1.5 L. Cardiology has been consulted. Continue to stress to patient importance of self efficacy and  on diet for CHF. Last BNP reviewed- and noted below   Recent Labs   Lab 05/08/24  1811   *       Monitor fluid/volume status  IV Lasix given per ED due to pulmonary edema noted on CXR  Recent heart catheterization -- LV EF 60%  BNP mildly elevated and CXR concerning for pulmonary edema  Additional dose of IV Lasix today  Continue BB as tolerated  Hold Entresto given LINDSAY  TTE with normal EF    Acute pulmonary edema  Lasix 60 mg IV given per ED  Monitor respiratory status and CXR  Additional dose of Lasix 40mg IV ordered for 5/9  Additional dose of Lasix 40mg IV ordered for 5/10  BID IV Lasix 40mg IV on 5/11 per cardiology recommendations will evaluate response and adjust as needed     LINDSAY (acute kidney injury)  Baseline creatinine appears to be 0.81 to 1.25  Monitor renal function closely, she was given Lasix in ED due to pulmonary edema  Entresto held     Chronic pain  Continue PRN Percocet -- she sees pain management      Generalized anxiety disorder  Not currently on medication, she does not appear particularly anxious at this time, however it could be contributing to her symptoms      Hypertension  Chronic, controlled. Latest blood pressure and vitals reviewed-     Temp:  [98 °F (36.7 °C)-99 °F (37.2 °C)]   Pulse:  [60-77]   Resp:  [18-20]   BP: (111-132)/(53-63)   SpO2:  [97 %-100 %] .   Home meds for hypertension were reviewed and noted below.   Hypertension Medications               amLODIPine (NORVASC) 5 MG tablet TAKE 1 TABLET BY MOUTH ONCE DAILY    metoprolol succinate (TOPROL XL) 50 MG 24 hr tablet 1 and 1/2 in the AM.  1 and 1/2 at night.    nitroGLYCERIN (NITROSTAT) 0.4 MG SL tablet USE AS DIRECTED    chlorthalidone (HYGROTEN) 25 MG Tab Take 25 mg  by mouth every morning.    cloNIDine (CATAPRES) 0.2 MG tablet Take 1 tablet (0.2 mg total) by mouth as needed.    furosemide (LASIX) 20 MG tablet Take 20 mg by mouth.    hydrALAZINE (APRESOLINE) 50 MG tablet Take 50 mg by mouth 3 (three) times daily.    isosorbide mononitrate (IMDUR) 30 MG 24 hr tablet Take 30 mg by mouth every morning.    losartan (COZAAR) 50 MG tablet TAKE 1 TABLET BY MOUTH EVERY DAY    sacubitriL-valsartan (ENTRESTO) 24-26 mg per tablet Take 1 tablet by mouth 2 (two) times daily.         -- Home medication list needs to be updated as several of these medications were discontinued recently including chlorthalidone, hydralazine, and Imdur      While in the hospital, will manage blood pressure as follows; continue home medications which were recommended at discharge several weeks ago    Will utilize p.r.n. blood pressure medication only if patient's blood pressure greater than 180/110 and she develops symptoms such as worsening chest pain or shortness of breath.    -BP labile  -Continue BB as tolerated  -Hold Entresto given bumped creatinine    Generalized convulsive epilepsy  No recent seizures, has not been on antiepileptics      Hyperlipidemia  Intolerant of statins        Acid reflux  Continue PPI      Coronary artery disease of bypass graft of native heart with stable angina pectoris  Patient with known CAD s/p stent placement and CABG, which is uncontrolled Will continue  Brilinta, Ranexa  and monitor for S/Sx of angina/ACS. Continue to monitor on telemetry.     Patient had recent heart catheterization and stents placed to LAD, left circumflex and left main on April 16, 2024 at [place]  At that time she was taken off of Plavix and started on Brilinta as well as Ranexa and Entresto  Consult cardiology  Continue home medications - Brilinta, Ranexa  Entresto held due to elevated Crit 1.7  Cardiac monitoring  PRN morphine ordered  She is also complaining of pain radiating into left neck - -will  try muscle relaxant as well  Not on statin due to statin intolerance  Will add low dose nitropatch if BP tolerates it  Additional dose of IV lasix ordered for today 5/10  IV lasix 40mg BID per cardiology, will evaluate response      Anemia in chronic illness  Patient's anemia is currently controlled. Has not received any PRBCs to date.   Current CBC reviewed-   Lab Results   Component Value Date    HGB 10.5 (L) 05/11/2024    HCT 31.4 (L) 05/11/2024     Monitor serial CBC and transfuse if patient becomes hemodynamically unstable, symptomatic or H/H drops below 7/21.      VTE Risk Mitigation (From admission, onward)           Ordered     IP VTE HIGH RISK PATIENT  Once         05/08/24 1957     Place sequential compression device  Until discontinued         05/08/24 1957                    Discharge Planning   SHAD:      Code Status: Full Code   Is the patient medically ready for discharge?:     Reason for patient still in hospital (select all that apply): Treatment and Consult recommendations  Discharge Plan A: Home with family        Patient seen and plan of care discussed with attending physician            Bebe Martinez NP  Department of Hospital Medicine   [facility] - Telemetry ([facility])

## 2024-05-11 NOTE — PROGRESS NOTES
[facility] Tele[facility] ([facility])  Cardiology  Progress Note     Patient Name: Paola Wood  MRN: [id number]  Admission Date: 5/8/2024  Hospital Length of Stay: 1 days  Code Status: Full Code   Attending Physician: Sapphire Stroud MD   Primary Care Physician: Grace Duron MD  Expected Discharge Date:   Principal Problem:Acute chest pain     Subjective:   HPI:  Ms. Wood is a 73 year old female patient whose current medical conditions include CAD s/p prior CABG with known occluded vein graft on the left and atretic LIMA, s/p recent PCI of LM, LCX, and LAD at [facility] on 4/16/24 due to UA, diastolic CHF, HTN, asthma, TIA, hyperlipidemia, statin, intolerance, and chronic anemia who presented to [facility] ED yesterday evening due to worsening SOB over the past 2-3 days. Patient reported she became winded with minimal activity, even when making her bed. Associated symptoms included upper back pain that radiated forward toward her chest and up her neck. She denied any associated fever, chills, nausea, vomiting, or diaphoresis. Initial workup in ED revealed creatinine of 1.7 and BNP of 312. CXR concerning for pulmonary edema and patient was subsequently given a dose of IV Lsix and admitted for further evaluation and treatment. Cardiology consulted to assist with management. Patient seen and examined today, resting in bed. Feels about the same since admission. Endorses LINN/CP. States she initially felt well post recent PCI procedure and only began feeling this way 2-3 days ago. She reports compliance with her medications, on ASA and Brilinta as OP. Serial troponin negative. BP/HR variable. EKG reviewed, SR with non  T wave inversions lateral leads.      Hospital Course:   5/10/24-Patient seen and examined today, sitting up in bed. Feeling better. SOB greatly improved. Still with intermittent CP, but also notes improvement. Labs reviewed. Serial troponin negative. Creatinine stable at 1.7. BP soft, meds  adjusted. TTE with normal EF.     5/11/24- Patient seen and examined today lying flat in the bed. Pt reports SOB when ambulating. Patient with CP. But does note when sitting up Sob isn't as bad. Still IV diuresising. Labs reviewed. Labs stable. Creatinine has improved from 1.7 to 1.6. Kidney function has improved.     Review of Systems   Constitutional: Positive for malaise/fatigue.   HENT: Negative.     Eyes: Negative.    Cardiovascular:  Positive for chest pain (improved but still intermittent) and dyspnea on exertion (improved).   Respiratory:  Positive for shortness of breath (improved).    Endocrine: Negative.    Hematologic/Lymphatic: Negative.    Skin: Negative.    Musculoskeletal:  Positive for arthritis and joint pain.   Gastrointestinal: Negative.    Genitourinary: Negative.    Neurological: Negative.    Psychiatric/Behavioral: Negative.     Allergic/Immunologic: Negative.       Objective:      Vital Signs (Most Recent):  Temp: 97.5 °F (36.4 °C) (05/10/24 1145)  Pulse: 61 (05/10/24 1145)  Resp: 18 (05/10/24 1145)  BP: (!) 111/51 (05/10/24 1145)  SpO2: 97 % (05/10/24 1145) Vital Signs (24h Range):  Temp:  [97.5 °F (36.4 °C)-98.9 °F (37.2 °C)] 97.5 °F (36.4 °C)  Pulse:  [57-68] 61  Resp:  [15-20] 18  SpO2:  [97 %-99 %] 97 %  BP: (100-128)/(48-58) 111/51      Weight: 68.9 kg (152 lb)  Body mass index is 26.93 kg/m².     SpO2: 97 %           Intake/Output Summary (Last 24 hours) at 5/10/2024 1228  Last data filed at 5/10/2024 0823      Gross per 24 hour   Intake 240 ml   Output 11 ml   Net 229 ml         Lines/Drains/Airways         Peripheral Intravenous Line  Duration                     Peripheral IV - Single Lumen 05/10/24 1055 22 G Left;Posterior Forearm <1 day                             Physical Exam  Vitals and nursing note reviewed.   Constitutional:       General: She is not in acute distress.     Appearance: Normal appearance. She is well-developed. She is not diaphoretic.   HENT:      Head:  Normocephalic and atraumatic.   Eyes:      General:         Right eye: No discharge.         Left eye: No discharge.      Pupils: Pupils are equal, round, and reactive to light.   Cardiovascular:      Rate and Rhythm: Normal rate and regular rhythm.      Heart sounds: Normal heart sounds, S1 normal and S2 normal. No murmur heard.  Pulmonary:      Effort: Pulmonary effort is normal. No respiratory distress.      Comments: Slightly diminished at bases  Abdominal:      General: There is no distension.   Musculoskeletal:      Right lower leg: No edema.      Left lower leg: No edema.   Skin:     General: Skin is warm and dry.      Findings: No erythema.   Neurological:      General: No focal deficit present.      Mental Status: She is alert and oriented to person, place, and time.   Psychiatric:         Mood and Affect: Mood normal.         Behavior: Behavior normal.               Significant Labs: CMP         Recent Labs   Lab 05/08/24  1811 05/09/24  0540 05/10/24  0427    139 137   K 4.9 4.3 5.2*    105 107   CO2 21* 23 16*   GLU 93 96 100   BUN 31* 34* 36*   CREATININE 1.7* 1.7* 1.7*   CALCIUM 9.9 9.2 9.2   PROT 8.2 7.4 8.1   ALBUMIN 3.5 3.2* 3.5   BILITOT 0.3 0.2 0.3   ALKPHOS 91 83 96   AST 16 13 22   ALT 13 11 11   ANIONGAP 11 11 14   , CBC         Recent Labs   Lab 05/08/24  1811 05/09/24  0539 05/10/24  0427   WBC 9.58 9.13 8.39   HGB 9.6* 9.8* 11.3*   HCT 29.9* 31.0* 36.8*    338 356   , Troponin         Recent Labs   Lab 05/08/24  2246 05/09/24  0216 05/09/24  0540   TROPONINI 0.022 0.025 0.019   , and All pertinent lab results from the last 24 hours have been reviewed.   BMP  Lab Results   Component Value Date     05/11/2024    K 3.9 05/11/2024     05/11/2024    CO2 21 (L) 05/11/2024    BUN 31 (H) 05/11/2024    CREATININE 1.6 (H) 05/11/2024    CALCIUM 9.3 05/11/2024    ANIONGAP 14 05/11/2024    EGFRNORACEVR 34 (A) 05/11/2024      Significant Imaging: Echocardiogram:  Transthoracic echo (TTE) complete ([facility] Only):         Results for orders placed or performed during the hospital encounter of 05/08/24   Echo   Result Value Ref Range     BSA 1.75 m2     LA WIDTH 3.0 cm     RA Width 2.6 cm     LVOT stroke volume 64.84 cm3     LVIDd 3.92 3.5 - 6.0 cm     LV Systolic Volume 22.35 mL     LV Systolic Volume Index 13.0 mL/m2     LVIDs 2.50 2.1 - 4.0 cm     LV Diastolic Volume 66.74 mL     LV Diastolic Volume Index 38.80 mL/m2     IVS 1.09 0.6 - 1.1 cm     LVOT diameter 1.96 cm     LVOT area 3.0 cm2     FS 36 28 - 44 %     Left Ventricle Relative Wall Thickness 0.54 cm     Posterior Wall 1.06 0.6 - 1.1 cm     LV mass 136.56 g     LV Mass Index 79 g/m2     MV Peak E Bj 0.87 m/s     TDI LATERAL 0.07 m/s     TDI SEPTAL 0.05 m/s     E/E' ratio 14.50 m/s     MV Peak A Bj 0.93 m/s     TR Max Bj 2.14 m/s     E/A ratio 0.94       IVRT 65.65 msec     E wave deceleration time 215.38 msec     LV SEPTAL E/E' RATIO 17.40 m/s     LA Volume Index 22.1 mL/m2     LV LATERAL E/E' RATIO 12.43 m/s     LA volume 37.95 cm3     LVOT peak bj 0.98 m/s     Left Ventricular Outflow Tract Mean Velocity 0.83 cm/s     Left Ventricular Outflow Tract Mean Gradient 2.77 mmHg     RVOT peak VTI 17.2 cm     TAPSE 1.75 cm     LA size 3.17 cm     Left Atrium Minor Axis 4.67 cm     Left Atrium Major Axis 4.72 cm     RA Major Axis 3.57 cm     AV mean gradient 5 mmHg     AV peak gradient 8 mmHg     Ao peak bj 1.43 m/s     Ao VTI 24.20 cm     LVOT peak VTI 21.50 cm     AV valve area 2.68 cm²     AV Velocity Ratio 0.69       AV index (prosthetic) 0.89       YANIRA by Velocity Ratio 2.07 cm²     Mr max bj 3.28 m/s     MV stenosis pressure 1/2 time 62.46 ms     MV valve area p 1/2 method 3.52 cm2     TV mean gradient 14 mmHg     Triscuspid Valve Regurgitation Peak Gradient 18 mmHg     PV mean gradient 1 mmHg     RVOT peak bj 0.66 m/s     Ao root annulus 2.64 cm     STJ 2.57 cm     Ascending aorta 2.47 cm     IVC diameter 1.41  cm     Mean e' 0.06 m/s     ZLVIDS -1.32       ZLVIDD -1.96       EF 60 %     TV resting pulmonary artery pressure 21 mmHg     RV TB RVSP 5 mmHg     Est. RA pres 3 mmHg     Narrative       Left Ventricle: The left ventricle is normal in size. Normal wall   thickness. There is concentric remodeling. Normal wall motion. Ejection   fraction by visual approximation is 60%. There is indeterminate diastolic   function.    Right Ventricle: Normal right ventricular cavity size. Wall thickness   is normal. Right ventricle wall motion  is normal. Systolic function is   normal.    Aortic Valve: The aortic valve is a trileaflet valve. There is mild   aortic valve sclerosis.    Pulmonary Artery: The estimated pulmonary artery systolic pressure is   21 mmHg.    IVC/SVC: Normal venous pressure at 3 mmHg.       and EKG:Normal sinus rhythm   Normal ECG   When compared with ECG of 08-MAY-2024 17:16,   No significant change was found   Assessment and Plan:     * Acute chest pain  -Presents with SOB/LINN and stabbing heavy CP  -Recent complex PCI of LM, LAD, and LCX at [facility] on 4/16/24  -Serial troponin negative  -TTE with normal EF  -Continue ASA, Brilinta, BB, Ranexa  -Will consider addition of nitrates     5/10/24  -SOB improved, will give additional dose of IV Lasix  -CP symptoms also improved  -Continue ASA, Brilinta, BB, Ranexa  -Will plan to add low dose nitropatch IF BP permits    5/11/24   - Creatinine has improved from 1.7 to 1.6   -Still feeling SOB today reports urinating a lot yesterday   -Will give lasix IV dose BID today        - Did not get nitro patch due to low BP      LINN (dyspnea on exertion)  -Assess response to IV diuresis  -? Related to Brilinta     Acute on chronic diastolic congestive heart failure  -BNP mildly elevated and CXR concerning for pulmonary edema  -Additional dose of IV Lasix today  -Continue BB as tolerated  -Hold Entresto given LINDSAY  -TTE with normal EF     5/10/24  -Clinically  improved  -Additional dose of IV Lasix today  -Continue BB    5/11/24  -Feeling SOB on exertion will give lasix IV dose x2 today      Acute pulmonary edema  -Additional dose of IV Lasix today, reassess in AM  -TTE with normal EF     5/10/24  -Additional Lasix today, clinically improved     LINDSAY (acute kidney injury)  -Monitor with IV diuresis      5/10/24  -Creatinine stable since admission     Hypertension  -BP labile  -Continue BB as tolerated  -Hold Entresto given bumped creatinine     Hyperlipidemia  -Statin intolerant     Coronary artery disease of bypass graft of native heart with stable angina pectoris  -See plan under CP        Anemia in chronic illness  -Stable, monitor           VTE Risk Mitigation (From admission, onward)              Ordered       IP VTE HIGH RISK PATIENT  Once         05/08/24 1957       Place sequential compression device  Until discontinued         05/08/24 1957                         Cardiology  [facility] - Tele[facility] ([facility])

## 2024-05-11 NOTE — SUBJECTIVE & OBJECTIVE
Interval History: f/u chest pain. Patient is awake and alert, laying in bed. Patient reports mild improvement in CP, rates 8/10. States the pain comes and goes. Denies any nausea, vomiting, diarrhea and abd pain. Patient continues to report significant shortness of breath. Cardiology ordered 40mg Lasix IV BID.     Review of Systems   Constitutional:  Positive for fatigue.   HENT: Negative.     Respiratory:  Positive for shortness of breath.    Cardiovascular:  Positive for chest pain (Mild improvement).   Gastrointestinal: Negative.    Genitourinary: Negative.    Musculoskeletal: Negative.    Skin: Negative.      Objective:     Vital Signs (Most Recent):  Temp: 98.8 °F (37.1 °C) (05/11/24 1140)  Pulse: 65 (05/11/24 1330)  Resp: 18 (05/11/24 1140)  BP: (!) 117/54 (05/11/24 1140)  SpO2: 98 % (05/11/24 1140) Vital Signs (24h Range):  Temp:  [98 °F (36.7 °C)-99 °F (37.2 °C)] 98.8 °F (37.1 °C)  Pulse:  [60-77] 65  Resp:  [18-20] 18  SpO2:  [97 %-100 %] 98 %  BP: (111-132)/(53-63) 117/54     Weight: 69.8 kg (153 lb 14.1 oz)  Body mass index is 27.26 kg/m².    Intake/Output Summary (Last 24 hours) at 5/11/2024 1437  Last data filed at 5/10/2024 1808  Gross per 24 hour   Intake 240 ml   Output 2 ml   Net 238 ml         Physical Exam  Vitals and nursing note reviewed.   Constitutional:       General: She is not in acute distress.     Appearance: She is not ill-appearing.   HENT:      Mouth/Throat:      Mouth: Mucous membranes are moist.      Pharynx: Oropharynx is clear.   Eyes:      Pupils: Pupils are equal, round, and reactive to light.   Cardiovascular:      Rate and Rhythm: Normal rate and regular rhythm.      Heart sounds: No murmur heard.  Pulmonary:      Effort: Pulmonary effort is normal.      Breath sounds: Normal breath sounds. No wheezing.   Abdominal:      General: Bowel sounds are normal. There is no distension.      Palpations: Abdomen is soft.      Tenderness: There is no abdominal tenderness.    Musculoskeletal:         General: Normal range of motion.   Skin:     General: Skin is warm and dry.   Neurological:      General: No focal deficit present.      Mental Status: She is alert and oriented to person, place, and time.             Significant Labs: All pertinent labs within the past 24 hours have been reviewed.  Recent Lab Results         05/11/24  0647        Albumin 3.2       ALP 92       ALT 8       Anion Gap 14       AST 13       Baso # 0.03       Basophil % 0.3       BILIRUBIN TOTAL 0.3  Comment: For infants and newborns, interpretation of results should be based  on gestational age, weight and in agreement with clinical  observations.    Premature Infant recommended reference ranges:  Up to 24 hours.............<8.0 mg/dL  Up to 48 hours............<12.0 mg/dL  3-5 days..................<15.0 mg/dL  6-29 days.................<15.0 mg/dL         BUN 31       Calcium 9.3       Chloride 105       CO2 21       Creatinine 1.6       Differential Method Automated       eGFR 34       Eos # 0.2       Eos % 1.4       Glucose 107       Gran # (ANC) 8.2       Gran % 78.9       Hematocrit 31.4       Hemoglobin 10.5       Immature Grans (Abs) 0.05  Comment: Mild elevation in immature granulocytes is non specific and   can be seen in a variety of conditions including stress response,   acute inflammation, trauma and pregnancy. Correlation with other   laboratory and clinical findings is essential.         Immature Granulocytes 0.5       Lymph # 1.1       Lymph % 10.2       Magnesium  2.5       MCH 28.8       MCHC 33.4       MCV 86  Comment: Reviewed by Technologist.       Mono # 0.9       Mono % 8.7       MPV 9.2       nRBC 0       Platelet Count 322       Potassium 3.9       PROTEIN TOTAL 7.8       RBC 3.64       RDW 14.5       Sodium 140       WBC 10.44               Significant Imaging: I have reviewed all pertinent imaging results/findings within the past 24 hours.

## 2024-05-12 LAB
ALBUMIN SERPL BCP-MCNC: 3.2 G/DL (ref 3.5–5.2)
ALP SERPL-CCNC: 91 U/L (ref 55–135)
ALT SERPL W/O P-5'-P-CCNC: 8 U/L (ref 10–44)
ANION GAP SERPL CALC-SCNC: 13 MMOL/L (ref 8–16)
AST SERPL-CCNC: 13 U/L (ref 10–40)
BASOPHILS # BLD AUTO: 0.04 K/UL (ref 0–0.2)
BASOPHILS NFR BLD: 0.4 % (ref 0–1.9)
BILIRUB SERPL-MCNC: 0.4 MG/DL (ref 0.1–1)
BUN SERPL-MCNC: 31 MG/DL (ref 8–23)
CALCIUM SERPL-MCNC: 8.7 MG/DL (ref 8.7–10.5)
CHLORIDE SERPL-SCNC: 102 MMOL/L (ref 95–110)
CO2 SERPL-SCNC: 23 MMOL/L (ref 23–29)
CREAT SERPL-MCNC: 1.9 MG/DL (ref 0.5–1.4)
DIFFERENTIAL METHOD BLD: ABNORMAL
EOSINOPHIL # BLD AUTO: 0.2 K/UL (ref 0–0.5)
EOSINOPHIL NFR BLD: 1.5 % (ref 0–8)
ERYTHROCYTE [DISTWIDTH] IN BLOOD BY AUTOMATED COUNT: 14.6 % (ref 11.5–14.5)
EST. GFR  (NO RACE VARIABLE): 28 ML/MIN/1.73 M^2
GLUCOSE SERPL-MCNC: 101 MG/DL (ref 70–110)
HCT VFR BLD AUTO: 30.5 % (ref 37–48.5)
HGB BLD-MCNC: 10.2 G/DL (ref 12–16)
IMM GRANULOCYTES # BLD AUTO: 0.05 K/UL (ref 0–0.04)
IMM GRANULOCYTES NFR BLD AUTO: 0.5 % (ref 0–0.5)
LYMPHOCYTES # BLD AUTO: 1.2 K/UL (ref 1–4.8)
LYMPHOCYTES NFR BLD: 12 % (ref 18–48)
MCH RBC QN AUTO: 28.9 PG (ref 27–31)
MCHC RBC AUTO-ENTMCNC: 33.4 G/DL (ref 32–36)
MCV RBC AUTO: 86 FL (ref 82–98)
MONOCYTES # BLD AUTO: 1 K/UL (ref 0.3–1)
MONOCYTES NFR BLD: 9.6 % (ref 4–15)
NEUTROPHILS # BLD AUTO: 7.9 K/UL (ref 1.8–7.7)
NEUTROPHILS NFR BLD: 76 % (ref 38–73)
NRBC BLD-RTO: 0 /100 WBC
OHS QRS DURATION: 84 MS
OHS QTC CALCULATION: 431 MS
PLATELET # BLD AUTO: 304 K/UL (ref 150–450)
PMV BLD AUTO: 9.2 FL (ref 9.2–12.9)
POTASSIUM SERPL-SCNC: 3.7 MMOL/L (ref 3.5–5.1)
PROT SERPL-MCNC: 7.2 G/DL (ref 6–8.4)
RBC # BLD AUTO: 3.53 M/UL (ref 4–5.4)
SODIUM SERPL-SCNC: 138 MMOL/L (ref 136–145)
WBC # BLD AUTO: 10.33 K/UL (ref 3.9–12.7)

## 2024-05-12 PROCEDURE — 25000003 PHARM REV CODE 250: Performed by: PHYSICIAN ASSISTANT

## 2024-05-12 PROCEDURE — 94761 N-INVAS EAR/PLS OXIMETRY MLT: CPT

## 2024-05-12 PROCEDURE — 25000003 PHARM REV CODE 250: Performed by: INTERNAL MEDICINE

## 2024-05-12 PROCEDURE — A4216 STERILE WATER/SALINE, 10 ML: HCPCS | Performed by: NURSE PRACTITIONER

## 2024-05-12 PROCEDURE — 21400001 HC TELEMETRY ROOM

## 2024-05-12 PROCEDURE — 85025 COMPLETE CBC W/AUTO DIFF WBC: CPT

## 2024-05-12 PROCEDURE — 25000003 PHARM REV CODE 250: Performed by: NURSE PRACTITIONER

## 2024-05-12 PROCEDURE — 25000003 PHARM REV CODE 250

## 2024-05-12 PROCEDURE — 36415 COLL VENOUS BLD VENIPUNCTURE: CPT

## 2024-05-12 PROCEDURE — 80053 COMPREHEN METABOLIC PANEL: CPT

## 2024-05-12 PROCEDURE — 25000003 PHARM REV CODE 250: Performed by: STUDENT IN AN ORGANIZED HEALTH CARE EDUCATION/TRAINING PROGRAM

## 2024-05-12 PROCEDURE — 27000221 HC OXYGEN, UP TO 24 HOURS

## 2024-05-12 PROCEDURE — 99233 SBSQ HOSP IP/OBS HIGH 50: CPT | Mod: ,,, | Performed by: STUDENT IN AN ORGANIZED HEALTH CARE EDUCATION/TRAINING PROGRAM

## 2024-05-12 RX ORDER — CLOPIDOGREL BISULFATE 75 MG/1
75 TABLET ORAL DAILY
Status: DISCONTINUED | OUTPATIENT
Start: 2024-05-12 | End: 2024-05-14 | Stop reason: HOSPADM

## 2024-05-12 RX ADMIN — OXYCODONE AND ACETAMINOPHEN 1 TABLET: 10; 325 TABLET ORAL at 12:05

## 2024-05-12 RX ADMIN — GEMFIBROZIL 600 MG: 600 TABLET ORAL at 04:05

## 2024-05-12 RX ADMIN — RANOLAZINE 500 MG: 500 TABLET, EXTENDED RELEASE ORAL at 09:05

## 2024-05-12 RX ADMIN — RANOLAZINE 500 MG: 500 TABLET, EXTENDED RELEASE ORAL at 08:05

## 2024-05-12 RX ADMIN — GEMFIBROZIL 600 MG: 600 TABLET ORAL at 06:05

## 2024-05-12 RX ADMIN — PANTOPRAZOLE SODIUM 40 MG: 40 TABLET, DELAYED RELEASE ORAL at 09:05

## 2024-05-12 RX ADMIN — SODIUM CHLORIDE 250 ML: 9 INJECTION, SOLUTION INTRAVENOUS at 08:05

## 2024-05-12 RX ADMIN — ASPIRIN 81 MG: 81 TABLET, COATED ORAL at 10:05

## 2024-05-12 RX ADMIN — Medication 3 ML: at 02:05

## 2024-05-12 RX ADMIN — FOLIC ACID 1 MG: 1 TABLET ORAL at 09:05

## 2024-05-12 RX ADMIN — METOPROLOL SUCCINATE 25 MG: 25 TABLET, FILM COATED, EXTENDED RELEASE ORAL at 09:05

## 2024-05-12 RX ADMIN — Medication 3 ML: at 08:05

## 2024-05-12 RX ADMIN — TICAGRELOR 90 MG: 90 TABLET ORAL at 09:05

## 2024-05-12 RX ADMIN — Medication 3 ML: at 06:05

## 2024-05-12 RX ADMIN — SODIUM CHLORIDE 250 ML: 9 INJECTION, SOLUTION INTRAVENOUS at 02:05

## 2024-05-12 RX ADMIN — CLOPIDOGREL BISULFATE 75 MG: 75 TABLET ORAL at 12:05

## 2024-05-12 NOTE — ASSESSMENT & PLAN NOTE
Patient's anemia is currently controlled. Has not received any PRBCs to date.   Current CBC reviewed-   Lab Results   Component Value Date    HGB 10.2 (L) 05/12/2024    HCT 30.5 (L) 05/12/2024     Monitor serial CBC and transfuse if patient becomes hemodynamically unstable, symptomatic or H/H drops below 7/21.

## 2024-05-12 NOTE — ASSESSMENT & PLAN NOTE
Acute worsening of what sounds like chronic chest pain  She was recently started on Entresto, Ranexa, and Brilinta after stents placed to LAD, left circumflex and left main at [facility]  Morphine ordered PRN  Trending troponins, initial troponin normal  Cardiac monitoring ordered  Cardiology consult   Continue ASA, Brilinta, BB, Ranexa  Will consider addition of nitrates on 5/9   Addition of low dose nitropatch if BP tolerates it 5/10

## 2024-05-12 NOTE — PROGRESS NOTES
[facility] Medicine  Progress Note    Patient Name: Paola Wood  MRN: [id number]  Patient Class: IP- Inpatient   Admission Date: 5/8/2024  Length of Stay: 3 days  Attending Physician: Sapphire Stroud MD  Primary Care Provider: Grace Duron MD        Subjective:     Principal Problem:Acute chest pain        HPI:  Patient is a 73-year-old female with past medical history significant for CAD status post CABG about 2 years ago and multiple coronary stents in the past -- most recently at [place] on 4/16/2024 with stents placed to LAD, left circumflex, and left main, diastolic congestive heart failure, hypertension, asthma, allergic rhinitis, chronic back pain, chronic dyspnea, TIA, GERD,  hyperlipidemia with statin intolerance, and chronic anemia who presented to ED with complaint of severe chest pain, rated 10/10.  She reports that she started feeling short of breath about 3 days ago and then started with upper back pain yesterday which radiate to through to her upper left chest and goes up into her left neck.  She states that the pain was intermittent, however now has become constant.   Initial vital signs in ED blood pressure 98/50, heart rate 69, afebrile, saturating 100% on room air.   Blood pressure did improve to the 130s systolic.   EKG showed normal sinus rhythm with sinus arrhythmia and nonspecific T-wave abnormality with T-wave inversion evident in lateral leads, no STEMI.  Chest x-ray showed mild perihilar interstitial hazy opacity suggestive of pulmonary edema.  Lab workup significant for hemoglobin 9.6, hematocrit 29.9, CO2 21, BUN 31, creatinine 1.7, , troponin 0.011.  ED nurses present in the room giving her morphine and Zofran at this time for her complaint of chest pain rated 9.5/10 currently. she was also given IV Lasix 60 mg while in ED. hospital medicine is consulted for admission due to chest pain.    Overview/Hospital Course:  72 y/o  female presents to ED with complaint of severe chest pain, rated 10/10.   EKG showed normal sinus rhythm with sinus arrhythmia and nonspecific T-wave abnormality with T-wave inversion evident in lateral leads, no STEMI.   Chest x-ray: showed mild perihilar interstitial hazy opacity suggestive of pulmonary edema.   Serial troponin negative   Cardiology consulted  ECHO: EF 60%, Indeterminate diastolic function and normal systolic function. PA pressure 21mmHg. Mild aortic valve sclerosis.   BP labile will continue BB as tolerated but hold Entresto given bumped creatinine. Addition dose of IV lasix 40mg given today and evaluate response in a.m. as patient continues to report severe CP and SOB.   Additional dose of IV lasix 40mg dose ordered today 5/10, Cardiology recommends addition of  low dose nitropatch IF BP permits. Will continue to assess response to medication adjustments per cardiology recommendations. Patient reports improvement in Chest pain states it is 9/10 now, will continue PRN pain medications as BP allows.   Cardiology ordered BID Lasix 40mg IV BID as the patient is continuing to report SOB.   Patient reported one episode of severe chest pain 5/11, PRN morphine given, STAT EKG and troponin ordered. Cardiology Dr. Carr aware and reviewed troponin and EKG. Patient reported improvement with morphine. EKG and troponin stable.     Patient BUN 31 & Crit 1.9 elevated from previous, held IV lasix. Consulted cardiology, Dr. Carr recommends 250ml NS bolus and will switch the brilinta to plavix as patient continues to report SOB. Will continue to trend BUN & Crit with morning labs.     Interval History: f/u chest pain. Patient is awake and alert, laying in bed. Patient reports mild improvement in CP from yesterday. States the pain comes and goes but continues to rate it 9/10. Patient continues to report SOB that is worse with exertion. Lasix held due to elevation in Bun & crit, NS 250ml bolus given per  cardiology recommendations. Cardiology to switch Brilinta to Plavix to see if there is any improvement in SOB. Will trend Bun/Crit with am labs.     Review of Systems  Objective:     Vital Signs (Most Recent):  Temp: 98.6 °F (37 °C) (05/12/24 1135)  Pulse: 68 (05/12/24 1135)  Resp: 18 (05/12/24 1232)  BP: (!) 113/57 (05/12/24 1135)  SpO2: 97 % (05/12/24 1135) Vital Signs (24h Range):  Temp:  [98.2 °F (36.8 °C)-98.9 °F (37.2 °C)] 98.6 °F (37 °C)  Pulse:  [60-74] 68  Resp:  [16-20] 18  SpO2:  [94 %-99 %] 97 %  BP: ()/(50-71) 113/57     Weight: 69.8 kg (153 lb 14.1 oz)  Body mass index is 27.26 kg/m².  No intake or output data in the 24 hours ending 05/12/24 1349      Physical Exam  Vitals and nursing note reviewed.   Constitutional:       Appearance: She is ill-appearing (chronically)  HENT:      Mouth/Throat:      Mouth: Mucous membranes are moist.      Pharynx: Oropharynx is clear.   Eyes:      Pupils: Pupils are equal, round, and reactive to light.   Cardiovascular:      Rate and Rhythm: Normal rate and regular rhythm.      Heart sounds: No murmur heard.  Pulmonary:      Effort: Pulmonary effort is normal.      Breath sounds: Normal breath sounds. No wheezing.   Abdominal:      General: Bowel sounds are normal. There is no distension.      Palpations: Abdomen is soft.      Tenderness: There is no abdominal tenderness.   Musculoskeletal:         General: Normal range of motion.   Skin:     General: Skin is warm and dry.      Coloration: Skin is pale.   Neurological:      General: No focal deficit present.      Mental Status: She is alert and oriented to person, place, and time.             Significant Labs: All pertinent labs within the past 24 hours have been reviewed.  Recent Lab Results         05/12/24  0550   05/11/24  1931        Albumin 3.2         ALP 91         ALT 8         Anion Gap 13         AST 13         Baso # 0.04         Basophil % 0.4         BILIRUBIN TOTAL 0.4  Comment: For infants and  newborns, interpretation of results should be based  on gestational age, weight and in agreement with clinical  observations.    Premature Infant recommended reference ranges:  Up to 24 hours.............<8.0 mg/dL  Up to 48 hours............<12.0 mg/dL  3-5 days..................<15.0 mg/dL  6-29 days.................<15.0 mg/dL           BUN 31         Calcium 8.7         Chloride 102         CO2 23         Creatinine 1.9         Differential Method Automated         eGFR 28         Eos # 0.2         Eos % 1.5         Glucose 101         Gran # (ANC) 7.9         Gran % 76.0         Hematocrit 30.5         Hemoglobin 10.2         Immature Grans (Abs) 0.05  Comment: Mild elevation in immature granulocytes is non specific and   can be seen in a variety of conditions including stress response,   acute inflammation, trauma and pregnancy. Correlation with other   laboratory and clinical findings is essential.           Immature Granulocytes 0.5         Lymph # 1.2         Lymph % 12.0         MCH 28.9         MCHC 33.4         MCV 86         Mono # 1.0         Mono % 9.6         MPV 9.2         nRBC 0         Platelet Count 304         Potassium 3.7         PROTEIN TOTAL 7.2         RBC 3.53         RDW 14.6         Sodium 138         Troponin I   0.018  Comment: The reference interval for Troponin I represents the 99th percentile   cutoff   for our facility and is consistent with 3rd generation assay   performance.         WBC 10.33                 Significant Imaging: I have reviewed all pertinent imaging results/findings within the past 24 hours.    Assessment/Plan:      * Acute chest pain  Acute worsening of what sounds like chronic chest pain  She was recently started on Entresto, Ranexa, and Brilinta after stents placed to LAD, left circumflex and left main at [place]  Morphine ordered PRN  Trending troponins, initial troponin normal  Cardiac monitoring ordered  Cardiology consult   Continue ASA, Brilinta, BB,  Ranexa  Will consider addition of nitrates on 5/9   Addition of low dose nitropatch if BP tolerates it 5/10    LINN (dyspnea on exertion)  Assess response to IV diuresis   -IV Lasix held due to elevation in BUN & Crit, will reassess in the a.m.   -NS 250ml bolus given per Dr. Carr with cardiology recommendation   -Cardiology switching the brilinita to plavix, as that may be cause of SOB     Acute on chronic diastolic congestive heart failure  Patient is identified as having Diastolic (HFpEF) heart failure that is Acute on chronic. CHF is currently controlled. Latest ECHO performed and demonstrates- No results found for this or any previous visit.  . Continue Beta Blocker and Furosemide and monitor clinical status closely. Monitor on telemetry. Patient is off CHF pathway.  Monitor strict Is&Os and daily weights.  Place on fluid restriction of 1.5 L. Cardiology has been consulted. Continue to stress to patient importance of self efficacy and  on diet for CHF. Last BNP reviewed- and noted below   Recent Labs   Lab 05/08/24  1811   *       Monitor fluid/volume status  IV Lasix given per ED due to pulmonary edema noted on CXR  Recent heart catheterization -- LV EF 60%  BNP mildly elevated and CXR concerning for pulmonary edema  Additional dose of IV Lasix today  Continue BB as tolerated  Hold Entresto given LINDSAY  TTE with normal EF    Acute pulmonary edema  Lasix 60 mg IV given per ED  Monitor respiratory status and CXR  Additional dose of Lasix 40mg IV ordered for 5/9  Additional dose of Lasix 40mg IV ordered for 5/10  BID IV Lasix 40mg IV on 5/11 per cardiology recommendations will evaluate response and adjust as needed   Lasix held due to elevation in bun/crit on 5/12    LINDSAY (acute kidney injury)  Baseline creatinine appears to be 0.81 to 1.25  Monitor renal function closely, she was given Lasix in ED due to pulmonary edema  Entresto held   Lasix held 5/12     Chronic pain  Continue PRN Percocet -- she  sees pain management      Generalized anxiety disorder  Not currently on medication, she does not appear particularly anxious at this time, however it could be contributing to her symptoms      Hypertension  Chronic, controlled. Latest blood pressure and vitals reviewed-     Temp:  [98.2 °F (36.8 °C)-98.9 °F (37.2 °C)]   Pulse:  [60-74]   Resp:  [16-20]   BP: ()/(50-71)   SpO2:  [94 %-99 %] .   Home meds for hypertension were reviewed and noted below.   Hypertension Medications               amLODIPine (NORVASC) 5 MG tablet TAKE 1 TABLET BY MOUTH ONCE DAILY    metoprolol succinate (TOPROL XL) 50 MG 24 hr tablet 1 and 1/2 in the AM.  1 and 1/2 at night.    nitroGLYCERIN (NITROSTAT) 0.4 MG SL tablet USE AS DIRECTED    chlorthalidone (HYGROTEN) 25 MG Tab Take 25 mg by mouth every morning.    cloNIDine (CATAPRES) 0.2 MG tablet Take 1 tablet (0.2 mg total) by mouth as needed.    furosemide (LASIX) 20 MG tablet Take 20 mg by mouth.    hydrALAZINE (APRESOLINE) 50 MG tablet Take 50 mg by mouth 3 (three) times daily.    isosorbide mononitrate (IMDUR) 30 MG 24 hr tablet Take 30 mg by mouth every morning.    losartan (COZAAR) 50 MG tablet TAKE 1 TABLET BY MOUTH EVERY DAY    sacubitriL-valsartan (ENTRESTO) 24-26 mg per tablet Take 1 tablet by mouth 2 (two) times daily.         -- Home medication list needs to be updated as several of these medications were discontinued recently including chlorthalidone, hydralazine, and Imdur      While in the hospital, will manage blood pressure as follows; continue home medications which were recommended at discharge several weeks ago    Will utilize p.r.n. blood pressure medication only if patient's blood pressure greater than 180/110 and she develops symptoms such as worsening chest pain or shortness of breath.    -BP labile  -Continue BB as tolerated  -Hold Entresto given bumped creatinine  -holding lasix due to increasing bun & Crit     Generalized convulsive epilepsy  No recent  seizures, has not been on antiepileptics      Hyperlipidemia  Intolerant of statins        Acid reflux  Continue PPI      Coronary artery disease of bypass graft of native heart with stable angina pectoris  Patient with known CAD s/p stent placement and CABG, which is uncontrolled Will continue  Brilinta, Ranexa  and monitor for S/Sx of angina/ACS. Continue to monitor on telemetry.     Patient had recent heart catheterization and stents placed to LAD, left circumflex and left main on April 16, 2024 at [place]  At that time she was taken off of Plavix and started on Brilinta as well as Ranexa and Entresto  Consult cardiology  Continue home medications - Brilinta, Ranexa  Entresto held due to elevated Crit 1.7  Cardiac monitoring  PRN morphine ordered  She is also complaining of pain radiating into left neck - -will try muscle relaxant as well  Not on statin due to statin intolerance  Will add low dose nitropatch if BP tolerates it  Additional dose of IV lasix ordered for today 5/10  IV lasix 40mg BID per cardiology, will evaluate response    Lasix held due to elevation in Bun and Crit- NS 250ml bolus given per cardiology Dr. Carr recommendations     Anemia in chronic illness  Patient's anemia is currently controlled. Has not received any PRBCs to date.   Current CBC reviewed-   Lab Results   Component Value Date    HGB 10.2 (L) 05/12/2024    HCT 30.5 (L) 05/12/2024     Monitor serial CBC and transfuse if patient becomes hemodynamically unstable, symptomatic or H/H drops below 7/21.      VTE Risk Mitigation (From admission, onward)           Ordered     IP VTE HIGH RISK PATIENT  Once         05/08/24 1957     Place sequential compression device  Until discontinued         05/08/24 1957                    Discharge Planning   SHAD:      Code Status: Full Code   Is the patient medically ready for discharge?:     Reason for patient still in hospital (select all that apply): Treatment and Consult  recommendations  Discharge Plan A: Home with family            Patient seen and plan of care discussed with the attending physician       Bebe Martinez NP  Department of Hospital Medicine   '[facility] - Telemetry ([facility])

## 2024-05-12 NOTE — ASSESSMENT & PLAN NOTE
Baseline creatinine appears to be 0.81 to 1.25  Monitor renal function closely, she was given Lasix in ED due to pulmonary edema  Entresto held   Lasix held 5/12

## 2024-05-12 NOTE — PROGRESS NOTES
[facility] - Telemetry ([facility])  Cardiology  Progress Note     Patient Name: Paola Wood  MRN: [id number]  Admission Date: 5/8/2024  Hospital Length of Stay: 1 days  Code Status: Full Code   Attending Physician: Sapphire Stroud MD   Primary Care Physician: Grace Duron MD  Expected Discharge Date:   Principal Problem:Acute chest pain     Subjective:   HPI:  Ms. Wood is a 73 year old female patient whose current medical conditions include CAD s/p prior CABG with known occluded vein graft on the left and atretic LIMA, s/p recent PCI of LM, LCX, and LAD at [place] on 4/16/24 due to UA, diastolic CHF, HTN, asthma, TIA, hyperlipidemia, statin, intolerance, and chronic anemia who presented to [facility] ED yesterday evening due to worsening SOB over the past 2-3 days. Patient reported she became winded with minimal activity, even when making her bed. Associated symptoms included upper back pain that radiated forward toward her chest and up her neck. She denied any associated fever, chills, nausea, vomiting, or diaphoresis. Initial workup in ED revealed creatinine of 1.7 and BNP of 312. CXR concerning for pulmonary edema and patient was subsequently given a dose of IV Lsix and admitted for further evaluation and treatment. Cardiology consulted to assist with management. Patient seen and examined today, resting in bed. Feels about the same since admission. Endorses LINN/CP. States she initially felt well post recent PCI procedure and only began feeling this way 2-3 days ago. She reports compliance with her medications, on ASA and Brilinta as OP. Serial troponin negative. BP/HR variable. EKG reviewed, SR with non  T wave inversions lateral leads.      Hospital Course:   5/10/24-Patient seen and examined today, sitting up in bed. Feeling better. SOB greatly improved. Still with intermittent CP, but also notes improvement. Labs reviewed. Serial troponin negative. Creatinine stable at 1.7. BP soft, meds  adjusted. TTE with normal EF.     5/11/24- Patient seen and examined today lying flat in the bed. Pt reports SOB when ambulating. Patient with CP. But does note when sitting up Sob isn't as bad. Still IV diuresising. Labs reviewed. Labs stable. Creatinine has improved from 1.7 to 1.6. Kidney function has improved.     5/12/24- Patient seen and examined today lying in bed. Pt reports that she isn't feeling fine today and that she has a headache. Pt states that she is still SOB. Labs reviewed. Creatinine bumped from 1.6 to 1.9.      Review of Systems   Constitutional: Positive for malaise/fatigue.   HENT: Negative.     Eyes: Negative.    Cardiovascular:  Positive for chest pain (improved but still intermittent) and dyspnea on exertion (improved).   Respiratory:  Positive for shortness of breath (improved).    Endocrine: Negative.    Hematologic/Lymphatic: Negative.    Skin: Negative.    Musculoskeletal:  Positive for arthritis and joint pain.   Gastrointestinal: Negative.    Genitourinary: Negative.    Neurological: Negative.    Psychiatric/Behavioral: Negative.     Allergic/Immunologic: Negative.       Objective:      Vital Signs (Most Recent):  Temp: 97.5 °F (36.4 °C) (05/10/24 1145)  Pulse: 61 (05/10/24 1145)  Resp: 18 (05/10/24 1145)  BP: (!) 111/51 (05/10/24 1145)  SpO2: 97 % (05/10/24 1145) Vital Signs (24h Range):  Temp:  [97.5 °F (36.4 °C)-98.9 °F (37.2 °C)] 97.5 °F (36.4 °C)  Pulse:  [57-68] 61  Resp:  [15-20] 18  SpO2:  [97 %-99 %] 97 %  BP: (100-128)/(48-58) 111/51      Weight: 68.9 kg (152 lb)  Body mass index is 26.93 kg/m².     SpO2: 97 %           Intake/Output Summary (Last 24 hours) at 5/10/2024 1228  Last data filed at 5/10/2024 0823        Gross per 24 hour   Intake 240 ml   Output 11 ml   Net 229 ml         Lines/Drains/Airways         Peripheral Intravenous Line  Duration                     Peripheral IV - Single Lumen 05/10/24 1055 22 G Left;Posterior Forearm <1 day                              Physical Exam  Vitals and nursing note reviewed.   Constitutional:       General: She is not in acute distress.     Appearance: Normal appearance. She is well-developed. She is not diaphoretic.   HENT:      Head: Normocephalic and atraumatic.   Eyes:      General:         Right eye: No discharge.         Left eye: No discharge.      Pupils: Pupils are equal, round, and reactive to light.   Cardiovascular:      Rate and Rhythm: Normal rate and regular rhythm.      Heart sounds: Normal heart sounds, S1 normal and S2 normal. No murmur heard.  Pulmonary:      Effort: Pulmonary effort is normal. No respiratory distress.      Comments: Slightly diminished at bases  Abdominal:      General: There is no distension.   Musculoskeletal:      Right lower leg: No edema.      Left lower leg: No edema.   Skin:     General: Skin is warm and dry.      Findings: No erythema.   Neurological:      General: No focal deficit present.      Mental Status: She is alert and oriented to person, place, and time.   Psychiatric:         Mood and Affect: Mood normal.         Behavior: Behavior normal.               Significant Labs: CMP                           Recent Labs    Lab 05/08/24  1811 05/09/24  0540 05/10/24  0427        139 137       K 4.9 4.3 5.2*        105 107       CO2 21* 23 16*       GLU 93 96 100       BUN 31* 34* 36*       CREATININE 1.7* 1.7* 1.7*       CALCIUM 9.9 9.2 9.2       PROT 8.2 7.4 8.1       ALBUMIN 3.5 3.2* 3.5       BILITOT 0.3 0.2 0.3       ALKPHOS 91 83 96       AST 16 13 22       ALT 13 11 11       ANIONGAP 11 11 14       , CBC                           Recent Labs    Lab 05/08/24  1811 05/09/24  0539 05/10/24  0427       WBC 9.58 9.13 8.39       HGB 9.6* 9.8* 11.3*       HCT 29.9* 31.0* 36.8*        338 356       , Troponin                           Recent Labs    Lab 05/08/24  2246 05/09/24  0216 05/09/24  0540       TROPONINI 0.022 0.025 0.019       , and All pertinent lab results from  the last 24 hours have been reviewed.   BMP        Lab Results   Component Value Date      05/11/2024     K 3.9 05/11/2024      05/11/2024     CO2 21 (L) 05/11/2024     BUN 31 (H) 05/11/2024     CREATININE 1.6 (H) 05/11/2024     CALCIUM 9.3 05/11/2024     ANIONGAP 14 05/11/2024     EGFRNORACEVR 34 (A) 05/11/2024      Significant Imaging: Echocardiogram: Transthoracic echo (TTE) complete ([facility] Only):             Results for orders placed or performed during the hospital encounter of 05/08/24   Echo   Result Value Ref Range     BSA 1.75 m2     LA WIDTH 3.0 cm     RA Width 2.6 cm     LVOT stroke volume 64.84 cm3     LVIDd 3.92 3.5 - 6.0 cm     LV Systolic Volume 22.35 mL     LV Systolic Volume Index 13.0 mL/m2     LVIDs 2.50 2.1 - 4.0 cm     LV Diastolic Volume 66.74 mL     LV Diastolic Volume Index 38.80 mL/m2     IVS 1.09 0.6 - 1.1 cm     LVOT diameter 1.96 cm     LVOT area 3.0 cm2     FS 36 28 - 44 %     Left Ventricle Relative Wall Thickness 0.54 cm     Posterior Wall 1.06 0.6 - 1.1 cm     LV mass 136.56 g     LV Mass Index 79 g/m2     MV Peak E Bj 0.87 m/s     TDI LATERAL 0.07 m/s     TDI SEPTAL 0.05 m/s     E/E' ratio 14.50 m/s     MV Peak A Bj 0.93 m/s     TR Max Bj 2.14 m/s     E/A ratio 0.94       IVRT 65.65 msec     E wave deceleration time 215.38 msec     LV SEPTAL E/E' RATIO 17.40 m/s     LA Volume Index 22.1 mL/m2     LV LATERAL E/E' RATIO 12.43 m/s     LA volume 37.95 cm3     LVOT peak bj 0.98 m/s     Left Ventricular Outflow Tract Mean Velocity 0.83 cm/s     Left Ventricular Outflow Tract Mean Gradient 2.77 mmHg     RVOT peak VTI 17.2 cm     TAPSE 1.75 cm     LA size 3.17 cm     Left Atrium Minor Axis 4.67 cm     Left Atrium Major Axis 4.72 cm     RA Major Axis 3.57 cm     AV mean gradient 5 mmHg     AV peak gradient 8 mmHg     Ao peak bj 1.43 m/s     Ao VTI 24.20 cm     LVOT peak VTI 21.50 cm     AV valve area 2.68 cm²     AV Velocity Ratio 0.69       AV index (prosthetic) 0.89        YANIRA by Velocity Ratio 2.07 cm²     Mr max consuelo 3.28 m/s     MV stenosis pressure 1/2 time 62.46 ms     MV valve area p 1/2 method 3.52 cm2     TV mean gradient 14 mmHg     Triscuspid Valve Regurgitation Peak Gradient 18 mmHg     PV mean gradient 1 mmHg     RVOT peak consuelo 0.66 m/s     Ao root annulus 2.64 cm     STJ 2.57 cm     Ascending aorta 2.47 cm     IVC diameter 1.41 cm     Mean e' 0.06 m/s     ZLVIDS -1.32       ZLVIDD -1.96       EF 60 %     TV resting pulmonary artery pressure 21 mmHg     RV TB RVSP 5 mmHg     Est. RA pres 3 mmHg     Narrative       Left Ventricle: The left ventricle is normal in size. Normal wall   thickness. There is concentric remodeling. Normal wall motion. Ejection   fraction by visual approximation is 60%. There is indeterminate diastolic   function.    Right Ventricle: Normal right ventricular cavity size. Wall thickness   is normal. Right ventricle wall motion  is normal. Systolic function is   normal.    Aortic Valve: The aortic valve is a trileaflet valve. There is mild   aortic valve sclerosis.    Pulmonary Artery: The estimated pulmonary artery systolic pressure is   21 mmHg.    IVC/SVC: Normal venous pressure at 3 mmHg.       and EKG:Normal sinus rhythm   Normal ECG   When compared with ECG of 08-MAY-2024 17:16,   No significant change was found   Assessment and Plan:      * Acute chest pain  -Presents with SOB/LINN and stabbing heavy CP  -Recent complex PCI of LM, LAD, and LCX at [place] on 4/16/24  -Serial troponin negative  -TTE with normal EF  -Continue ASA, Brilinta, BB, Ranexa  -Will consider addition of nitrates     5/10/24  -SOB improved, will give additional dose of IV Lasix  -CP symptoms also improved  -Continue ASA, Brilinta, BB, Ranexa  -Will plan to add low dose nitropatch IF BP permits     5/11/24   - Creatinine has improved from 1.7 to 1.6   -Still feeling SOB today reports urinating a lot yesterday   -Will give lasix IV dose BID today        - Did not get nitro  patch due to low BP          5/12/24      - Kidney function bumped Will give gentle fluids      -Still having SOB will switch brilinta to plavix       -Holding all diuretics       -Had chest pain, troponin negative        -Will try nitropaste again today      LINN (dyspnea on exertion)  -Assess response to IV diuresis  -? Related to Brilinta     Acute on chronic diastolic congestive heart failure  -BNP mildly elevated and CXR concerning for pulmonary edema  -Additional dose of IV Lasix today  -Continue BB as tolerated  -Hold Entresto given LINDSAY  -TTE with normal EF     5/10/24  -Clinically improved  -Additional dose of IV Lasix today  -Continue BB     5/11/24  -Feeling SOB on exertion will give lasix IV dose x2 today       5/12/24   -Still having SOB not helped by lasix,  - Will hold lasix today   Acute pulmonary edema  -Additional dose of IV Lasix today, reassess in AM  -TTE with normal EF     5/10/24  -Additional Lasix today, clinically improved     LINDSAY (acute kidney injury)  -Monitor with IV diuresis      5/10/24  -Creatinine stable since admission     Hypertension  -BP labile  -Continue BB as tolerated  -Hold Entresto given bumped creatinine     Hyperlipidemia  -Statin intolerant     Coronary artery disease of bypass graft of native heart with stable angina pectoris  -See plan under CP        Anemia in chronic illness  -Stable, monitor           VTE Risk Mitigation (From admission, onward)              Ordered       IP VTE HIGH RISK PATIENT  Once         05/08/24 1957       Place sequential compression device  Until discontinued         05/08/24 1957                        Satno Carr MD.  Cardiology  [facility] - Telemetry ([facility])

## 2024-05-12 NOTE — ASSESSMENT & PLAN NOTE
Patient with known CAD s/p stent placement and CABG, which is uncontrolled Will continue  Brilinta, Ranexa  and monitor for S/Sx of angina/ACS. Continue to monitor on telemetry.     Patient had recent heart catheterization and stents placed to LAD, left circumflex and left main on April 16, 2024 at [place]  At that time she was taken off of Plavix and started on Brilinta as well as Ranexa and Entresto  Consult cardiology  Continue home medications - Brilinta, Ranexa  Entresto held due to elevated Crit 1.7  Cardiac monitoring  PRN morphine ordered  She is also complaining of pain radiating into left neck - -will try muscle relaxant as well  Not on statin due to statin intolerance  Will add low dose nitropatch if BP tolerates it  Additional dose of IV lasix ordered for today 5/10  IV lasix 40mg BID per cardiology, will evaluate response    Lasix held due to elevation in Bun and Crit- NS 250ml bolus given per cardiology Dr. Carr recommendations

## 2024-05-12 NOTE — ASSESSMENT & PLAN NOTE
Chronic, controlled. Latest blood pressure and vitals reviewed-     Temp:  [98.2 °F (36.8 °C)-98.9 °F (37.2 °C)]   Pulse:  [60-74]   Resp:  [16-20]   BP: ()/(50-71)   SpO2:  [94 %-99 %] .   Home meds for hypertension were reviewed and noted below.   Hypertension Medications               amLODIPine (NORVASC) 5 MG tablet TAKE 1 TABLET BY MOUTH ONCE DAILY    metoprolol succinate (TOPROL XL) 50 MG 24 hr tablet 1 and 1/2 in the AM.  1 and 1/2 at night.    nitroGLYCERIN (NITROSTAT) 0.4 MG SL tablet USE AS DIRECTED    chlorthalidone (HYGROTEN) 25 MG Tab Take 25 mg by mouth every morning.    cloNIDine (CATAPRES) 0.2 MG tablet Take 1 tablet (0.2 mg total) by mouth as needed.    furosemide (LASIX) 20 MG tablet Take 20 mg by mouth.    hydrALAZINE (APRESOLINE) 50 MG tablet Take 50 mg by mouth 3 (three) times daily.    isosorbide mononitrate (IMDUR) 30 MG 24 hr tablet Take 30 mg by mouth every morning.    losartan (COZAAR) 50 MG tablet TAKE 1 TABLET BY MOUTH EVERY DAY    sacubitriL-valsartan (ENTRESTO) 24-26 mg per tablet Take 1 tablet by mouth 2 (two) times daily.         -- Home medication list needs to be updated as several of these medications were discontinued recently including chlorthalidone, hydralazine, and Imdur      While in the hospital, will manage blood pressure as follows; continue home medications which were recommended at discharge several weeks ago    Will utilize p.r.n. blood pressure medication only if patient's blood pressure greater than 180/110 and she develops symptoms such as worsening chest pain or shortness of breath.    -BP labile  -Continue BB as tolerated  -Hold Entresto given bumped creatinine  -holding lasix due to increasing bun & Crit

## 2024-05-12 NOTE — ASSESSMENT & PLAN NOTE
Lasix 60 mg IV given per ED  Monitor respiratory status and CXR  Additional dose of Lasix 40mg IV ordered for 5/9  Additional dose of Lasix 40mg IV ordered for 5/10  BID IV Lasix 40mg IV on 5/11 per cardiology recommendations will evaluate response and adjust as needed   Lasix held due to elevation in bun/crit on 5/12

## 2024-05-12 NOTE — SUBJECTIVE & OBJECTIVE
Interval History: f/u chest pain. Patient is awake and alert, laying in bed. Patient reports mild improvement in CP from yesterday. States the pain comes and goes but continues to rate it 9/10. Patient continues to report SOB that is worse with exertion. Lasix held due to elevation in Bun & crit, NS 250ml bolus given per cardiology recommendations. Cardiology to switch Brilinta to Plavix to see if there is any improvement in SOB. Will trend Bun/Crit with am labs.     Review of Systems  Objective:     Vital Signs (Most Recent):  Temp: 98.6 °F (37 °C) (05/12/24 1135)  Pulse: 68 (05/12/24 1135)  Resp: 18 (05/12/24 1232)  BP: (!) 113/57 (05/12/24 1135)  SpO2: 97 % (05/12/24 1135) Vital Signs (24h Range):  Temp:  [98.2 °F (36.8 °C)-98.9 °F (37.2 °C)] 98.6 °F (37 °C)  Pulse:  [60-74] 68  Resp:  [16-20] 18  SpO2:  [94 %-99 %] 97 %  BP: ()/(50-71) 113/57     Weight: 69.8 kg (153 lb 14.1 oz)  Body mass index is 27.26 kg/m².  No intake or output data in the 24 hours ending 05/12/24 1349      Physical Exam  Vitals and nursing note reviewed.   Constitutional:       Appearance: She is ill-appearing.   HENT:      Mouth/Throat:      Mouth: Mucous membranes are moist.      Pharynx: Oropharynx is clear.   Eyes:      Pupils: Pupils are equal, round, and reactive to light.   Cardiovascular:      Rate and Rhythm: Normal rate and regular rhythm.      Heart sounds: No murmur heard.  Pulmonary:      Effort: Pulmonary effort is normal.      Breath sounds: Normal breath sounds. No wheezing.   Abdominal:      General: Bowel sounds are normal. There is no distension.      Palpations: Abdomen is soft.      Tenderness: There is no abdominal tenderness.   Musculoskeletal:         General: Normal range of motion.   Skin:     General: Skin is warm and dry.      Coloration: Skin is pale.   Neurological:      General: No focal deficit present.      Mental Status: She is alert and oriented to person, place, and time.             Significant  Labs: All pertinent labs within the past 24 hours have been reviewed.  Recent Lab Results         05/12/24  0550   05/11/24  1931        Albumin 3.2         ALP 91         ALT 8         Anion Gap 13         AST 13         Baso # 0.04         Basophil % 0.4         BILIRUBIN TOTAL 0.4  Comment: For infants and newborns, interpretation of results should be based  on gestational age, weight and in agreement with clinical  observations.    Premature Infant recommended reference ranges:  Up to 24 hours.............<8.0 mg/dL  Up to 48 hours............<12.0 mg/dL  3-5 days..................<15.0 mg/dL  6-29 days.................<15.0 mg/dL           BUN 31         Calcium 8.7         Chloride 102         CO2 23         Creatinine 1.9         Differential Method Automated         eGFR 28         Eos # 0.2         Eos % 1.5         Glucose 101         Gran # (ANC) 7.9         Gran % 76.0         Hematocrit 30.5         Hemoglobin 10.2         Immature Grans (Abs) 0.05  Comment: Mild elevation in immature granulocytes is non specific and   can be seen in a variety of conditions including stress response,   acute inflammation, trauma and pregnancy. Correlation with other   laboratory and clinical findings is essential.           Immature Granulocytes 0.5         Lymph # 1.2         Lymph % 12.0         MCH 28.9         MCHC 33.4         MCV 86         Mono # 1.0         Mono % 9.6         MPV 9.2         nRBC 0         Platelet Count 304         Potassium 3.7         PROTEIN TOTAL 7.2         RBC 3.53         RDW 14.6         Sodium 138         Troponin I   0.018  Comment: The reference interval for Troponin I represents the 99th percentile   cutoff   for our facility and is consistent with 3rd generation assay   performance.         WBC 10.33                 Significant Imaging: I have reviewed all pertinent imaging results/findings within the past 24 hours.

## 2024-05-12 NOTE — ASSESSMENT & PLAN NOTE
Assess response to IV diuresis   -IV Lasix held due to elevation in BUN & Crit, will reassess in the a.m.   -NS 250ml bolus given per Dr. Carr with cardiology recommendation   -Cardiology switching the brilinita to plavix, as that may be cause of SOB

## 2024-05-13 LAB
ANION GAP SERPL CALC-SCNC: 12 MMOL/L (ref 8–16)
BASOPHILS # BLD AUTO: 0.05 K/UL (ref 0–0.2)
BASOPHILS NFR BLD: 0.6 % (ref 0–1.9)
BUN SERPL-MCNC: 32 MG/DL (ref 8–23)
CALCIUM SERPL-MCNC: 9.3 MG/DL (ref 8.7–10.5)
CHLORIDE SERPL-SCNC: 104 MMOL/L (ref 95–110)
CO2 SERPL-SCNC: 19 MMOL/L (ref 23–29)
CREAT SERPL-MCNC: 1.9 MG/DL (ref 0.5–1.4)
DIFFERENTIAL METHOD BLD: ABNORMAL
EOSINOPHIL # BLD AUTO: 0.2 K/UL (ref 0–0.5)
EOSINOPHIL NFR BLD: 3.1 % (ref 0–8)
ERYTHROCYTE [DISTWIDTH] IN BLOOD BY AUTOMATED COUNT: 14.6 % (ref 11.5–14.5)
EST. GFR  (NO RACE VARIABLE): 28 ML/MIN/1.73 M^2
GLUCOSE SERPL-MCNC: 117 MG/DL (ref 70–110)
HCT VFR BLD AUTO: 33.2 % (ref 37–48.5)
HGB BLD-MCNC: 10.1 G/DL (ref 12–16)
IMM GRANULOCYTES # BLD AUTO: 0.03 K/UL (ref 0–0.04)
IMM GRANULOCYTES NFR BLD AUTO: 0.4 % (ref 0–0.5)
LYMPHOCYTES # BLD AUTO: 1.1 K/UL (ref 1–4.8)
LYMPHOCYTES NFR BLD: 13.5 % (ref 18–48)
MCH RBC QN AUTO: 28.3 PG (ref 27–31)
MCHC RBC AUTO-ENTMCNC: 30.4 G/DL (ref 32–36)
MCV RBC AUTO: 93 FL (ref 82–98)
MONOCYTES # BLD AUTO: 0.7 K/UL (ref 0.3–1)
MONOCYTES NFR BLD: 9.1 % (ref 4–15)
NEUTROPHILS # BLD AUTO: 5.7 K/UL (ref 1.8–7.7)
NEUTROPHILS NFR BLD: 73.3 % (ref 38–73)
NRBC BLD-RTO: 0 /100 WBC
PLATELET # BLD AUTO: 332 K/UL (ref 150–450)
PMV BLD AUTO: 9.5 FL (ref 9.2–12.9)
POTASSIUM SERPL-SCNC: 4 MMOL/L (ref 3.5–5.1)
RBC # BLD AUTO: 3.57 M/UL (ref 4–5.4)
SODIUM SERPL-SCNC: 135 MMOL/L (ref 136–145)
WBC # BLD AUTO: 7.76 K/UL (ref 3.9–12.7)

## 2024-05-13 PROCEDURE — 80048 BASIC METABOLIC PNL TOTAL CA: CPT | Performed by: PHYSICIAN ASSISTANT

## 2024-05-13 PROCEDURE — 63600175 PHARM REV CODE 636 W HCPCS: Performed by: INTERNAL MEDICINE

## 2024-05-13 PROCEDURE — 25000003 PHARM REV CODE 250: Performed by: STUDENT IN AN ORGANIZED HEALTH CARE EDUCATION/TRAINING PROGRAM

## 2024-05-13 PROCEDURE — 25000003 PHARM REV CODE 250: Performed by: NURSE PRACTITIONER

## 2024-05-13 PROCEDURE — 25000003 PHARM REV CODE 250: Performed by: INTERNAL MEDICINE

## 2024-05-13 PROCEDURE — A4216 STERILE WATER/SALINE, 10 ML: HCPCS | Performed by: NURSE PRACTITIONER

## 2024-05-13 PROCEDURE — 63600175 PHARM REV CODE 636 W HCPCS: Performed by: NURSE PRACTITIONER

## 2024-05-13 PROCEDURE — 25000003 PHARM REV CODE 250: Performed by: PHYSICIAN ASSISTANT

## 2024-05-13 PROCEDURE — 21400001 HC TELEMETRY ROOM

## 2024-05-13 PROCEDURE — 36415 COLL VENOUS BLD VENIPUNCTURE: CPT | Performed by: PHYSICIAN ASSISTANT

## 2024-05-13 PROCEDURE — 99232 SBSQ HOSP IP/OBS MODERATE 35: CPT | Mod: ,,, | Performed by: PHYSICIAN ASSISTANT

## 2024-05-13 PROCEDURE — 85025 COMPLETE CBC W/AUTO DIFF WBC: CPT | Performed by: PHYSICIAN ASSISTANT

## 2024-05-13 RX ADMIN — RANOLAZINE 500 MG: 500 TABLET, EXTENDED RELEASE ORAL at 08:05

## 2024-05-13 RX ADMIN — NITROGLYCERIN 0.5 INCH: 20 OINTMENT TOPICAL at 05:05

## 2024-05-13 RX ADMIN — Medication 3 ML: at 05:05

## 2024-05-13 RX ADMIN — CLOPIDOGREL BISULFATE 75 MG: 75 TABLET ORAL at 08:05

## 2024-05-13 RX ADMIN — METOPROLOL SUCCINATE 25 MG: 25 TABLET, FILM COATED, EXTENDED RELEASE ORAL at 08:05

## 2024-05-13 RX ADMIN — OXYCODONE AND ACETAMINOPHEN 1 TABLET: 10; 325 TABLET ORAL at 05:05

## 2024-05-13 RX ADMIN — PANTOPRAZOLE SODIUM 40 MG: 40 TABLET, DELAYED RELEASE ORAL at 08:05

## 2024-05-13 RX ADMIN — NITROGLYCERIN 0.5 INCH: 20 OINTMENT TOPICAL at 12:05

## 2024-05-13 RX ADMIN — MORPHINE SULFATE 2 MG: 4 INJECTION INTRAVENOUS at 08:05

## 2024-05-13 RX ADMIN — GEMFIBROZIL 600 MG: 600 TABLET ORAL at 05:05

## 2024-05-13 RX ADMIN — Medication 3 ML: at 10:05

## 2024-05-13 RX ADMIN — ASPIRIN 81 MG: 81 TABLET, COATED ORAL at 08:05

## 2024-05-13 RX ADMIN — ONDANSETRON 4 MG: 2 INJECTION INTRAMUSCULAR; INTRAVENOUS at 08:05

## 2024-05-13 RX ADMIN — FOLIC ACID 1 MG: 1 TABLET ORAL at 08:05

## 2024-05-13 RX ADMIN — AMITRIPTYLINE HYDROCHLORIDE 150 MG: 50 TABLET, FILM COATED ORAL at 08:05

## 2024-05-13 NOTE — ASSESSMENT & PLAN NOTE
-Assess response to IV diuresis  -? Related to Brilinta    5/13/24  -Stable/improved today  -Brilinta switched to Plavix

## 2024-05-13 NOTE — ASSESSMENT & PLAN NOTE
Assess response to IV diuresis   -IV Lasix held due to elevation in BUN & Crit, will reassess in the a.m.   -NS 250ml bolus given per Dr. Carr with cardiology recommendation   -Cardiology switching the brilinita to plavix, as that may be cause of SOB     On room air     Retention Suture Bite Size: 3 mm

## 2024-05-13 NOTE — ASSESSMENT & PLAN NOTE
-Monitor with IV diuresis     5/10/24  -Creatinine stable since admission    5/13/24  -Creatinine 1.9

## 2024-05-13 NOTE — SUBJECTIVE & OBJECTIVE
Interval History: See hospital course for today      Review of Systems   Constitutional:  Positive for activity change and appetite change (d/t complaints of hospital food). Negative for fever.   HENT:  Positive for voice change.    Respiratory:  Negative for shortness of breath.    Cardiovascular:  Negative for chest pain and leg swelling.   Gastrointestinal:  Negative for nausea and vomiting.   Genitourinary:  Positive for frequency.   Psychiatric/Behavioral:  Negative for agitation, behavioral problems, confusion, decreased concentration and dysphoric mood. The patient is not nervous/anxious.      Objective:     Vital Signs (Most Recent):  Temp: 98.2 °F (36.8 °C) (05/13/24 1612)  Pulse: 65 (05/13/24 1612)  Resp: 18 (05/13/24 1612)  BP: 139/63 (05/13/24 1612)  SpO2: 99 % (05/13/24 1612) Vital Signs (24h Range):  Temp:  [98.1 °F (36.7 °C)-98.7 °F (37.1 °C)] 98.2 °F (36.8 °C)  Pulse:  [55-77] 65  Resp:  [16-18] 18  SpO2:  [92 %-99 %] 99 %  BP: ()/(51-63) 139/63     Weight: 69.8 kg (153 lb 14.1 oz)  Body mass index is 27.26 kg/m².    Intake/Output Summary (Last 24 hours) at 5/13/2024 1641  Last data filed at 5/12/2024 1834  Gross per 24 hour   Intake 249.55 ml   Output --   Net 249.55 ml         Physical Exam  Vitals and nursing note reviewed.   Constitutional:       General: She is not in acute distress.     Appearance: She is ill-appearing. She is not toxic-appearing.   HENT:      Head: Normocephalic and atraumatic.   Cardiovascular:      Rate and Rhythm: Normal rate.      Heart sounds: Murmur heard.   Pulmonary:      Effort: Pulmonary effort is normal. No respiratory distress.   Chest:       Abdominal:      Palpations: Abdomen is soft.      Tenderness: There is no abdominal tenderness.   Musculoskeletal:      Right lower leg: No edema.      Left lower leg: No edema.   Skin:     General: Skin is warm.   Neurological:      Motor: Weakness present.   Psychiatric:         Mood and Affect: Mood normal.          Behavior: Behavior normal.             Significant Labs: All pertinent labs within the past 24 hours have been reviewed.  CBC:   Recent Labs   Lab 05/12/24  0550 05/13/24  0824   WBC 10.33 7.76   HGB 10.2* 10.1*   HCT 30.5* 33.2*    332     CMP:   Recent Labs   Lab 05/12/24  0550 05/13/24  0824    135*   K 3.7 4.0    104   CO2 23 19*    117*   BUN 31* 32*   CREATININE 1.9* 1.9*   CALCIUM 8.7 9.3   PROT 7.2  --    ALBUMIN 3.2*  --    BILITOT 0.4  --    ALKPHOS 91  --    AST 13  --    ALT 8*  --    ANIONGAP 13 12     Troponin:   Recent Labs   Lab 05/11/24  1931   TROPONINI 0.018       Significant Imaging: I have reviewed all pertinent imaging results/findings within the past 24 hours.

## 2024-05-13 NOTE — ASSESSMENT & PLAN NOTE
Chronic, controlled. Latest blood pressure and vitals reviewed-     Temp:  [98.1 °F (36.7 °C)-98.7 °F (37.1 °C)]   Pulse:  [55-77]   Resp:  [16-18]   BP: ()/(51-63)   SpO2:  [92 %-99 %] .   Home meds for hypertension were reviewed and noted below.   Hypertension Medications               amLODIPine (NORVASC) 5 MG tablet TAKE 1 TABLET BY MOUTH ONCE DAILY    metoprolol succinate (TOPROL XL) 50 MG 24 hr tablet 1 and 1/2 in the AM.  1 and 1/2 at night.    nitroGLYCERIN (NITROSTAT) 0.4 MG SL tablet USE AS DIRECTED    chlorthalidone (HYGROTEN) 25 MG Tab Take 25 mg by mouth every morning.    cloNIDine (CATAPRES) 0.2 MG tablet Take 1 tablet (0.2 mg total) by mouth as needed.    furosemide (LASIX) 20 MG tablet Take 20 mg by mouth.    hydrALAZINE (APRESOLINE) 50 MG tablet Take 50 mg by mouth 3 (three) times daily.    isosorbide mononitrate (IMDUR) 30 MG 24 hr tablet Take 30 mg by mouth every morning.    losartan (COZAAR) 50 MG tablet TAKE 1 TABLET BY MOUTH EVERY DAY    sacubitriL-valsartan (ENTRESTO) 24-26 mg per tablet Take 1 tablet by mouth 2 (two) times daily.         -- Home medication list needs to be updated as several of these medications were discontinued recently including chlorthalidone, hydralazine, and Imdur      While in the hospital, will manage blood pressure as follows; continue home medications which were recommended at discharge several weeks ago    Will utilize p.r.n. blood pressure medication only if patient's blood pressure greater than 180/110 and she develops symptoms such as worsening chest pain or shortness of breath.    -BP labile  -Continue BB as tolerated  -Hold Entresto given bumped creatinine  -holding lasix due to increasing bun & Crit

## 2024-05-13 NOTE — CHAPLAIN
Initial visit with patient.  Visited with patient to assess for spiritual and emotional needs.  Patient was doing good at the time of my visit and took time to share some of her story from her life.  She also talked a lot about her alberto and how it is important to her, and how it is helping her get through each day.  Patient wanted me to pray for her and I took time to do this before leaving.  Spiritual care remains available as needed.    Chaplain Alonzo Garza M.Div., [facility]

## 2024-05-13 NOTE — ASSESSMENT & PLAN NOTE
-BNP mildly elevated and CXR concerning for pulmonary edema  -Additional dose of IV Lasix today  -Continue BB as tolerated  -Hold Entresto given LINDSAY  -TTE with normal EF    5/10/24  -Clinically improved  -Additional dose of IV Lasix today  -Continue BB    5/13/24  -Stable/improved, diuretics held given bumped creatinine

## 2024-05-13 NOTE — ASSESSMENT & PLAN NOTE
Patient with known CAD s/p stent placement and CABG, which is uncontrolled Will continue  Brilinta, Ranexa  and monitor for S/Sx of angina/ACS. Continue to monitor on telemetry.     Patient had recent heart catheterization and stents placed to LAD, left circumflex and left main on April 16, 2024 at [place]  At that time she was taken off of Plavix and started on Brilinta as well as Ranexa and Entresto  Consult cardiology  Continue home medications - Brilinta, Ranexa  Entresto held due to elevated Crit 1.7  Cardiac monitoring  PRN morphine ordered  She is also complaining of pain radiating into left neck - -will try muscle relaxant as well  Not on statin due to statin intolerance  Will add low dose nitropatch if BP tolerates it  Additional dose of IV lasix ordered for today 5/10  IV lasix 40mg BID per cardiology, will evaluate response    Lasix held due to elevation in Bun and Crit- NS 250ml bolus given per cardiology Dr. Carr recommendations     Further cardiology recommendations pending

## 2024-05-13 NOTE — ASSESSMENT & PLAN NOTE
-Additional dose of IV Lasix today, reassess in AM  -TTE with normal EF    5/10/24  -Additional Lasix today, clinically improved    5/13/24  -Stable/improved

## 2024-05-13 NOTE — PLAN OF CARE
ongoing (interventions implemented as appropriate)  pt axo4 VSS  pt able to make needs known  pt remained afebrile throughout this shift  pt remained free of falls this shift  pt denies pain this shift  plan of care reviewed pt verbalizes understanding  Pt moving/turning independent. Frequent weight shifting encouraged  Pt sinus rico rhythm of monitor  Bed low, side rails up x 2, wheels locked,call light in reach  Hourly rounding completed  Will continue to observe

## 2024-05-13 NOTE — ASSESSMENT & PLAN NOTE
Baseline creatinine appears to be 0.81 to 1.25  Monitor renal function closely, she was given Lasix in ED due to pulmonary edema  Entresto held   Lasix held 5/12   Creatinine 1.9

## 2024-05-13 NOTE — ASSESSMENT & PLAN NOTE
Acute worsening of what sounds like chronic chest pain  She was recently started on Entresto, Ranexa, and Brilinta after stents placed to LAD, left circumflex and left main at [place]  Morphine ordered PRN  Trending troponins, initial troponin normal  Cardiac monitoring ordered  Cardiology consult   Continue ASA, Brilinta, BB, Ranexa  Will consider addition of nitrates on 5/9   Addition of low dose nitropatch if BP tolerates it 5/10    Resolved

## 2024-05-13 NOTE — ASSESSMENT & PLAN NOTE
Lasix 60 mg IV given per ED  Monitor respiratory status and CXR  Additional dose of Lasix 40mg IV ordered for 5/9  Additional dose of Lasix 40mg IV ordered for 5/10  BID IV Lasix 40mg IV on 5/11 per cardiology recommendations will evaluate response and adjust as needed   Lasix held due to elevation in bun/crit on 5/12    On room air

## 2024-05-13 NOTE — ASSESSMENT & PLAN NOTE
-Presents with SOB/LINN and stabbing heavy CP  -Recent complex PCI of LM, LAD, and LCX at [place] on 4/16/24  -Serial troponin negative  -TTE with normal EF  -Continue ASA, Brilinta, BB, Ranexa  -Will consider addition of nitrates    5/10/24  -SOB improved, will give additional dose of IV Lasix  -CP symptoms also improved  -Continue ASA, Brilinta, BB, Ranexa  -Will plan to add low dose nitropatch IF BP permits    5/11/24   - Creatinine has improved from 1.7 to 1.6   -Still feeling SOB today reports urinating a lot yesterday   -Will give lasix IV dose BID today   - Did not get nitro patch due to low BP      5/12/24  - Kidney function bumped Will give gentle fluids  -Still having SOB will switch brilinta to plavix    -Holding all diuretics    -Had chest pain, troponin negative    -Will try nitropaste again today     5/13/24  -Stable, no CP or SOB this AM  -Brilinta switched to Plavix yesterday  -Continue ASA, Plavix, BB-  -Recommend low dose nitropatch if BP tolerates

## 2024-05-13 NOTE — PLAN OF CARE
A205/A205 PAULeonor Wood is a 73 y.o.female admitted on 5/8/2024 for Acute chest pain   Code Status: Full Code MRN: [id number]   Review of patient's allergies indicates:   Allergen Reactions    Ace inhibitors Swelling     Other reaction(s): tongue swelling  Other reaction(s): Other (See Comments)  Other reaction(s): tongue swelling      Bacitracin     Ezetimibe     Lisinopril      Other reaction(s): Not available    Neomycin     Niacin     Polymyxin b     Atorvastatin Rash    Rosuvastatin Nausea And Vomiting    Statins-hmg-coa reductase inhibitors Nausea And Vomiting     Other reaction(s): Not available     Past Medical History:   Diagnosis Date    Allergy     AR (allergic rhinitis)     Asthma     CAD (coronary artery disease)     CHF (congestive heart failure)     Chronic pain     neck/back/arm/knee    GERD (gastroesophageal reflux disease)     Hypertension     Seizures     SOB (shortness of breath)     TIA (transient ischemic attack)       PRN meds    acetaminophen, 650 mg, Q4H PRN  aluminum-magnesium hydroxide-simethicone, 30 mL, QID PRN  dextrose 10%, 12.5 g, PRN  dextrose 10%, 25 g, PRN  glucagon (human recombinant), 1 mg, PRN  glucose, 16 g, PRN  glucose, 24 g, PRN  melatonin, 6 mg, Nightly PRN  morphine, 2 mg, Q4H PRN  naloxone, 0.02 mg, PRN  ondansetron, 4 mg, Q6H PRN  oxyCODONE-acetaminophen, 1 tablet, Q6H PRN  polyethylene glycol, 17 g, Daily PRN  promethazine, 25 mg, Q6H PRN      Chart check completed. Will continue plan of care.         Ronda Coma Scale Score: 15     Lead Monitored: Lead II Rhythm: normal sinus rhythm Frequency/Ectopy: PACs  Cardiac/Telemetry Box Number: [id number]  VTE Required Core Measure: Pharmacological prophylaxis initiated/maintained Last Bowel Movement: 05/12/24  Diet Cardiac     Jassi Score: 20  Fall Risk Score: 17  Accucheck []   Freq?      Lines/Drains/Airways       Peripheral Intravenous Line  Duration                  Peripheral IV - Single Lumen 05/10/24 1055 22 G  Left;Posterior Forearm 3 days                       Problem: Adult Inpatient Plan of Care  Goal: Plan of Care Review  Outcome: Progressing  Goal: Patient-Specific Goal (Individualized)  Outcome: Progressing  Goal: Absence of Hospital-Acquired Illness or Injury  Outcome: Progressing  Goal: Optimal Comfort and Wellbeing  Outcome: Progressing  Goal: Readiness for Transition of Care  Outcome: Progressing     Problem: Infection  Goal: Absence of Infection Signs and Symptoms  Outcome: Progressing     Problem: Acute Kidney Injury/Impairment  Goal: Fluid and Electrolyte Balance  Outcome: Progressing  Goal: Improved Oral Intake  Outcome: Progressing  Goal: Effective Renal Function  Outcome: Progressing     Problem: Chest Pain  Goal: Resolution of Chest Pain Symptoms  Outcome: Progressing     Problem: Fall Injury Risk  Goal: Absence of Fall and Fall-Related Injury  Outcome: Progressing     Problem: Skin Injury Risk Increased  Goal: Skin Health and Integrity  Outcome: Progressing

## 2024-05-13 NOTE — PROGRESS NOTES
[facility] Medicine  Progress Note    Patient Name: Paola Wood  MRN: [id number]  Patient Class: IP- Inpatient   Admission Date: 5/8/2024  Length of Stay: 4 days  Attending Physician: Eli Dee MD  Primary Care Provider: Grace Duron MD        Subjective:     Principal Problem:Acute chest pain        HPI:  Patient is a 73-year-old female with past medical history significant for CAD status post CABG about 2 years ago and multiple coronary stents in the past -- most recently at [facility] on 4/16/2024 with stents placed to LAD, left circumflex, and left main, diastolic congestive heart failure, hypertension, asthma, allergic rhinitis, chronic back pain, chronic dyspnea, TIA, GERD,  hyperlipidemia with statin intolerance, and chronic anemia who presented to ED with complaint of severe chest pain, rated 10/10.  She reports that she started feeling short of breath about 3 days ago and then started with upper back pain yesterday which radiate to through to her upper left chest and goes up into her left neck.  She states that the pain was intermittent, however now has become constant.   Initial vital signs in ED blood pressure 98/50, heart rate 69, afebrile, saturating 100% on room air.   Blood pressure did improve to the 130s systolic.   EKG showed normal sinus rhythm with sinus arrhythmia and nonspecific T-wave abnormality with T-wave inversion evident in lateral leads, no STEMI.  Chest x-ray showed mild perihilar interstitial hazy opacity suggestive of pulmonary edema.  Lab workup significant for hemoglobin 9.6, hematocrit 29.9, CO2 21, BUN 31, creatinine 1.7, , troponin 0.011.  ED nurses present in the room giving her morphine and Zofran at this time for her complaint of chest pain rated 9.5/10 currently. she was also given IV Lasix 60 mg while in ED. hospital medicine is consulted for admission due to chest pain.    Overview/Hospital Course:  72 y/o female  presents to ED with complaint of severe chest pain, rated 10/10.   EKG showed normal sinus rhythm with sinus arrhythmia and nonspecific T-wave abnormality with T-wave inversion evident in lateral leads, no STEMI.   Chest x-ray: showed mild perihilar interstitial hazy opacity suggestive of pulmonary edema.   Serial troponin negative   Cardiology consulted  ECHO: EF 60%, Indeterminate diastolic function and normal systolic function. PA pressure 21mmHg. Mild aortic valve sclerosis.   BP labile will continue BB as tolerated but hold Entresto given bumped creatinine. Addition dose of IV lasix 40mg given today and evaluate response in a.m. as patient continues to report severe CP and SOB.   Additional dose of IV lasix 40mg dose ordered today 5/10, Cardiology recommends addition of  low dose nitropatch IF BP permits. Will continue to assess response to medication adjustments per cardiology recommendations. Patient reports improvement in Chest pain states it is 9/10 now, will continue PRN pain medications as BP allows.   Cardiology ordered BID Lasix 40mg IV BID as the patient is continuing to report SOB.   Patient reported one episode of severe chest pain 5/11, PRN morphine given, STAT EKG and troponin ordered. Cardiology Dr. Carr aware and reviewed troponin and EKG. Patient reported improvement with morphine.     Patient BUN 31 & Crit 1.9 elevated from previous, held IV lasix. Consulted cardiology, Dr. Carr recommends 250ml NS bolus and will switch the brilinta to plavix as patient continues to report SOB. Will continue to trend BUN & Crit with morning labs.   5/13 creatinine remains 1.9. physical/occupational therapy consulted. Anticipate discharge tomorrow.     Interval History: See hospital course for today      Review of Systems   Constitutional:  Positive for activity change and appetite change (d/t complaints of hospital food). Negative for fever.   HENT:  Positive for voice change.    Respiratory:  Negative for  shortness of breath.    Cardiovascular:  Negative for chest pain and leg swelling.   Gastrointestinal:  Negative for nausea and vomiting.   Genitourinary:  Positive for frequency.   Psychiatric/Behavioral:  Negative for agitation, behavioral problems, confusion, decreased concentration and dysphoric mood. The patient is not nervous/anxious.      Objective:     Vital Signs (Most Recent):  Temp: 98.2 °F (36.8 °C) (05/13/24 1612)  Pulse: 65 (05/13/24 1612)  Resp: 18 (05/13/24 1612)  BP: 139/63 (05/13/24 1612)  SpO2: 99 % (05/13/24 1612) Vital Signs (24h Range):  Temp:  [98.1 °F (36.7 °C)-98.7 °F (37.1 °C)] 98.2 °F (36.8 °C)  Pulse:  [55-77] 65  Resp:  [16-18] 18  SpO2:  [92 %-99 %] 99 %  BP: ()/(51-63) 139/63     Weight: 69.8 kg (153 lb 14.1 oz)  Body mass index is 27.26 kg/m².    Intake/Output Summary (Last 24 hours) at 5/13/2024 1641  Last data filed at 5/12/2024 1834  Gross per 24 hour   Intake 249.55 ml   Output --   Net 249.55 ml         Physical Exam  Vitals and nursing note reviewed.   Constitutional:       General: She is not in acute distress.     Appearance: She is ill-appearing. She is not toxic-appearing.   HENT:      Head: Normocephalic and atraumatic.   Cardiovascular:      Rate and Rhythm: Normal rate.      Heart sounds: Murmur heard.   Pulmonary:      Effort: Pulmonary effort is normal. No respiratory distress.   Chest:       Abdominal:      Palpations: Abdomen is soft.      Tenderness: There is no abdominal tenderness.   Musculoskeletal:      Right lower leg: No edema.      Left lower leg: No edema.   Skin:     General: Skin is warm.   Neurological:      Motor: Weakness present.   Psychiatric:         Mood and Affect: Mood normal.         Behavior: Behavior normal.             Significant Labs: All pertinent labs within the past 24 hours have been reviewed.  CBC:   Recent Labs   Lab 05/12/24  0550 05/13/24  0824   WBC 10.33 7.76   HGB 10.2* 10.1*   HCT 30.5* 33.2*    332     CMP:   Recent  Labs   Lab 05/12/24  0550 05/13/24  0824    135*   K 3.7 4.0    104   CO2 23 19*    117*   BUN 31* 32*   CREATININE 1.9* 1.9*   CALCIUM 8.7 9.3   PROT 7.2  --    ALBUMIN 3.2*  --    BILITOT 0.4  --    ALKPHOS 91  --    AST 13  --    ALT 8*  --    ANIONGAP 13 12     Troponin:   Recent Labs   Lab 05/11/24  1931   TROPONINI 0.018       Significant Imaging: I have reviewed all pertinent imaging results/findings within the past 24 hours.    Assessment/Plan:      * Acute chest pain  Acute worsening of what sounds like chronic chest pain  She was recently started on Entresto, Ranexa, and Brilinta after stents placed to LAD, left circumflex and left main at [place]  Morphine ordered PRN  Trending troponins, initial troponin normal  Cardiac monitoring ordered  Cardiology consult   Continue ASA, Brilinta, BB, Ranexa  Will consider addition of nitrates on 5/9   Addition of low dose nitropatch if BP tolerates it 5/10    Resolved      Dyspnea on exertion  Assess response to IV diuresis   -IV Lasix held due to elevation in BUN & Crit, will reassess in the a.m.   -NS 250ml bolus given per Dr. Carr with cardiology recommendation   -Cardiology switching the brilinita to plavix, as that may be cause of SOB     On room air      Acute on chronic diastolic congestive heart failure  Patient is identified as having Diastolic (HFpEF) heart failure that is Acute on chronic. CHF is currently controlled. Latest ECHO performed and demonstrates- No results found for this or any previous visit.  . Continue Beta Blocker and Furosemide and monitor clinical status closely. Monitor on telemetry. Patient is off CHF pathway.  Monitor strict Is&Os and daily weights.  Place on fluid restriction of 1.5 L. Cardiology has been consulted. Continue to stress to patient importance of self efficacy and  on diet for CHF. Last BNP reviewed- and noted below   Recent Labs   Lab 05/08/24  1811   *       Monitor fluid/volume  status  IV Lasix given per ED due to pulmonary edema noted on CXR  Recent heart catheterization -- LV EF 60%  BNP mildly elevated and CXR concerning for pulmonary edema  Additional dose of IV Lasix today  Continue BB as tolerated  Hold Entresto given LINDSAY  TTE with normal EF    Acute pulmonary edema  Lasix 60 mg IV given per ED  Monitor respiratory status and CXR  Additional dose of Lasix 40mg IV ordered for 5/9  Additional dose of Lasix 40mg IV ordered for 5/10  BID IV Lasix 40mg IV on 5/11 per cardiology recommendations will evaluate response and adjust as needed   Lasix held due to elevation in bun/crit on 5/12    On room air    LINDSAY (acute kidney injury)  Baseline creatinine appears to be 0.81 to 1.25  Monitor renal function closely, she was given Lasix in ED due to pulmonary edema  Entresto held   Lasix held 5/12   Creatinine 1.9     Chronic pain  Continue PRN Percocet -- she sees pain management      Generalized anxiety disorder  Not currently on medication, she does not appear particularly anxious at this time, however it could be contributing to her symptoms      Hypertension  Chronic, controlled. Latest blood pressure and vitals reviewed-     Temp:  [98.1 °F (36.7 °C)-98.7 °F (37.1 °C)]   Pulse:  [55-77]   Resp:  [16-18]   BP: ()/(51-63)   SpO2:  [92 %-99 %] .   Home meds for hypertension were reviewed and noted below.   Hypertension Medications               amLODIPine (NORVASC) 5 MG tablet TAKE 1 TABLET BY MOUTH ONCE DAILY    metoprolol succinate (TOPROL XL) 50 MG 24 hr tablet 1 and 1/2 in the AM.  1 and 1/2 at night.    nitroGLYCERIN (NITROSTAT) 0.4 MG SL tablet USE AS DIRECTED    chlorthalidone (HYGROTEN) 25 MG Tab Take 25 mg by mouth every morning.    cloNIDine (CATAPRES) 0.2 MG tablet Take 1 tablet (0.2 mg total) by mouth as needed.    furosemide (LASIX) 20 MG tablet Take 20 mg by mouth.    hydrALAZINE (APRESOLINE) 50 MG tablet Take 50 mg by mouth 3 (three) times daily.    isosorbide mononitrate  (IMDUR) 30 MG 24 hr tablet Take 30 mg by mouth every morning.    losartan (COZAAR) 50 MG tablet TAKE 1 TABLET BY MOUTH EVERY DAY    sacubitriL-valsartan (ENTRESTO) 24-26 mg per tablet Take 1 tablet by mouth 2 (two) times daily.         -- Home medication list needs to be updated as several of these medications were discontinued recently including chlorthalidone, hydralazine, and Imdur      While in the hospital, will manage blood pressure as follows; continue home medications which were recommended at discharge several weeks ago    Will utilize p.r.n. blood pressure medication only if patient's blood pressure greater than 180/110 and she develops symptoms such as worsening chest pain or shortness of breath.    -BP labile  -Continue BB as tolerated  -Hold Entresto given bumped creatinine  -holding lasix due to increasing bun & Crit     Generalized convulsive epilepsy  No recent seizures, has not been on antiepileptics      Hyperlipidemia  Intolerant of statins  On gemfibrozil    Acid reflux  Continue PPI      Coronary artery disease of bypass graft of native heart with stable angina pectoris  Patient with known CAD s/p stent placement and CABG, which is uncontrolled Will continue  Brilinta, Ranexa  and monitor for S/Sx of angina/ACS. Continue to monitor on telemetry.     Patient had recent heart catheterization and stents placed to LAD, left circumflex and left main on April 16, 2024 at [place]  At that time she was taken off of Plavix and started on Brilinta as well as Ranexa and Entresto  Consult cardiology  Continue home medications - Brilinta, Ranexa  Entresto held due to elevated Crit 1.7  Cardiac monitoring  PRN morphine ordered  She is also complaining of pain radiating into left neck - -will try muscle relaxant as well  Not on statin due to statin intolerance  Will add low dose nitropatch if BP tolerates it  Additional dose of IV lasix ordered for today 5/10  IV lasix 40mg BID per cardiology, will evaluate  response    Lasix held due to elevation in Bun and Crit- NS 250ml bolus given per cardiology Dr. Carr recommendations     Further cardiology recommendations pending    Anemia in chronic illness  Patient's anemia is currently controlled. Has not received any PRBCs to date.   Current CBC reviewed-   Lab Results   Component Value Date    HGB 10.1 (L) 05/13/2024    HCT 33.2 (L) 05/13/2024     Monitor serial CBC and transfuse if patient becomes hemodynamically unstable, symptomatic or H/H drops below 7/21.      VTE Risk Mitigation (From admission, onward)           Ordered     IP VTE HIGH RISK PATIENT  Once         05/08/24 1957     Place sequential compression device  Until discontinued         05/08/24 1957                    Discharge Planning   SHAD:      Code Status: Full Code   Is the patient medically ready for discharge?:     Reason for patient still in hospital (select all that apply): Patient trending condition, Laboratory test, Treatment, Consult recommendations, PT / OT recommendations, and Pending disposition  Discharge Plan A: Home with family                  Eli Dee MD  Department of Hospital Medicine   '[place] - Telemetry ([facility])

## 2024-05-13 NOTE — PROGRESS NOTES
[facility] - Telemetry ([facility])  Cardiology  Progress Note    Patient Name: Paola Wood  MRN: [id number]  Admission Date: 5/8/2024  Hospital Length of Stay: 4 days  Code Status: Full Code   Attending Physician: Eli Dee MD   Primary Care Physician: Grace Duron MD  Expected Discharge Date:   Principal Problem:Acute chest pain    Subjective:   HPI:  Ms. Wood is a 73 year old female patient whose current medical conditions include CAD s/p prior CABG with known occluded vein graft on the left and atretic LIMA, s/p recent PCI of LM, LCX, and LAD at [place] on 4/16/24 due to UA, diastolic CHF, HTN, asthma, TIA, hyperlipidemia, statin, intolerance, and chronic anemia who presented to [facility] ED yesterday evening due to worsening SOB over the past 2-3 days. Patient reported she became winded with minimal activity, even when making her bed. Associated symptoms included upper back pain that radiated forward toward her chest and up her neck. She denied any associated fever, chills, nausea, vomiting, or diaphoresis. Initial workup in ED revealed creatinine of 1.7 and BNP of 312. CXR concerning for pulmonary edema and patient was subsequently given a dose of IV Lsix and admitted for further evaluation and treatment. Cardiology consulted to assist with management. Patient seen and examined today, resting in bed. Feels about the same since admission. Endorses LINN/CP. States she initially felt well post recent PCI procedure and only began feeling this way 2-3 days ago. She reports compliance with her medications, on ASA and Brilinta as OP. Serial troponin negative. BP/HR variable. EKG reviewed, SR with non  T wave inversions lateral leads    Hospital Course:   5/10/24-Patient seen and examined today, sitting up in bed. Feeling better. SOB greatly improved. Still with intermittent CP, but also notes improvement. Labs reviewed. Serial troponin negative. Creatinine stable at 1.7. BP soft, meds adjusted. TTE  with normal EF.    5/11/24- Patient seen and examined today lying flat in the bed. Pt reports SOB when ambulating. Patient with CP. But does note when sitting up Sob isn't as bad. Still IV diuresising. Labs reviewed. Labs stable. Creatinine has improved from 1.7 to 1.6. Kidney function has improved.      5/12/24- Patient seen and examined today lying in bed. Pt reports that she isn't feeling fine today and that she has a headache. Pt states that she is still SOB. Labs reviewed. Creatinine bumped from 1.6 to 1.9.     5/13/24-Patient seen and examined today, resting in bed. Feeling better. No CP or SOB during exam. Labs reviewed. Creatinine still 1.9. Brilinta switched to Plavix yesterday.           Review of Systems   Constitutional: Positive for malaise/fatigue.   HENT: Negative.     Eyes: Negative.    Cardiovascular: Negative.    Respiratory: Negative.     Endocrine: Negative.    Hematologic/Lymphatic: Negative.    Skin: Negative.    Musculoskeletal: Negative.    Gastrointestinal: Negative.    Genitourinary: Negative.    Neurological: Negative.    Psychiatric/Behavioral: Negative.     Allergic/Immunologic: Negative.      Objective:     Vital Signs (Most Recent):  Temp: 98.1 °F (36.7 °C) (05/13/24 0740)  Pulse: 70 (05/13/24 0850)  Resp: 18 (05/13/24 0740)  BP: (!) 113/57 (05/13/24 0850)  SpO2: 95 % (05/13/24 0740) Vital Signs (24h Range):  Temp:  [98.1 °F (36.7 °C)-98.7 °F (37.1 °C)] 98.1 °F (36.7 °C)  Pulse:  [55-77] 70  Resp:  [16-18] 18  SpO2:  [95 %-99 %] 95 %  BP: ()/(51-57) 113/57     Weight: 69.8 kg (153 lb 14.1 oz)  Body mass index is 27.26 kg/m².     SpO2: 95 %         Intake/Output Summary (Last 24 hours) at 5/13/2024 1048  Last data filed at 5/12/2024 1834  Gross per 24 hour   Intake 249.55 ml   Output --   Net 249.55 ml       Lines/Drains/Airways       Peripheral Intravenous Line  Duration                  Peripheral IV - Single Lumen 05/10/24 1055 22 G Left;Posterior Forearm 2 days                        Physical Exam  Vitals and nursing note reviewed.   Constitutional:       General: She is not in acute distress.     Appearance: Normal appearance. She is well-developed. She is not diaphoretic.   HENT:      Head: Normocephalic and atraumatic.   Eyes:      General:         Right eye: No discharge.         Left eye: No discharge.      Pupils: Pupils are equal, round, and reactive to light.   Cardiovascular:      Rate and Rhythm: Normal rate and regular rhythm.      Heart sounds: Normal heart sounds, S1 normal and S2 normal. No murmur heard.  Pulmonary:      Effort: Pulmonary effort is normal.   Musculoskeletal:      Right lower leg: No edema.      Left lower leg: No edema.   Skin:     General: Skin is warm and dry.      Findings: No erythema.   Neurological:      General: No focal deficit present.      Mental Status: She is alert and oriented to person, place, and time.   Psychiatric:         Behavior: Behavior normal.            Significant Labs: CMP   Recent Labs   Lab 05/12/24  0550 05/13/24  0824    135*   K 3.7 4.0    104   CO2 23 19*    117*   BUN 31* 32*   CREATININE 1.9* 1.9*   CALCIUM 8.7 9.3   PROT 7.2  --    ALBUMIN 3.2*  --    BILITOT 0.4  --    ALKPHOS 91  --    AST 13  --    ALT 8*  --    ANIONGAP 13 12   , CBC   Recent Labs   Lab 05/12/24  0550 05/13/24  0824   WBC 10.33 7.76   HGB 10.2* 10.1*   HCT 30.5* 33.2*    332   , Troponin   Recent Labs   Lab 05/11/24  1931   TROPONINI 0.018   , and All pertinent lab results from the last 24 hours have been reviewed.    Significant Imaging: Echocardiogram: Transthoracic echo (TTE) complete ([facility]upid Only):   Results for orders placed or performed during the hospital encounter of 05/08/24   Echo   Result Value Ref Range    BSA 1.75 m2    LA WIDTH 3.0 cm    RA Width 2.6 cm    LVOT stroke volume 64.84 cm3    LVIDd 3.92 3.5 - 6.0 cm    LV Systolic Volume 22.35 mL    LV Systolic Volume Index 13.0 mL/m2    LVIDs 2.50 2.1 - 4.0 cm    LV  Diastolic Volume 66.74 mL    LV Diastolic Volume Index 38.80 mL/m2    IVS 1.09 0.6 - 1.1 cm    LVOT diameter 1.96 cm    LVOT area 3.0 cm2    FS 36 28 - 44 %    Left Ventricle Relative Wall Thickness 0.54 cm    Posterior Wall 1.06 0.6 - 1.1 cm    LV mass 136.56 g    LV Mass Index 79 g/m2    MV Peak E Bj 0.87 m/s    TDI LATERAL 0.07 m/s    TDI SEPTAL 0.05 m/s    E/E' ratio 14.50 m/s    MV Peak A Bj 0.93 m/s    TR Max Bj 2.14 m/s    E/A ratio 0.94     IVRT 65.65 msec    E wave deceleration time 215.38 msec    LV SEPTAL E/E' RATIO 17.40 m/s    LA Volume Index 22.1 mL/m2    LV LATERAL E/E' RATIO 12.43 m/s    LA volume 37.95 cm3    LVOT peak bj 0.98 m/s    Left Ventricular Outflow Tract Mean Velocity 0.83 cm/s    Left Ventricular Outflow Tract Mean Gradient 2.77 mmHg    RVOT peak VTI 17.2 cm    TAPSE 1.75 cm    LA size 3.17 cm    Left Atrium Minor Axis 4.67 cm    Left Atrium Major Axis 4.72 cm    RA Major Axis 3.57 cm    AV mean gradient 5 mmHg    AV peak gradient 8 mmHg    Ao peak bj 1.43 m/s    Ao VTI 24.20 cm    LVOT peak VTI 21.50 cm    AV valve area 2.68 cm²    AV Velocity Ratio 0.69     AV index (prosthetic) 0.89     YANIRA by Velocity Ratio 2.07 cm²    Mr max bj 3.28 m/s    MV stenosis pressure 1/2 time 62.46 ms    MV valve area p 1/2 method 3.52 cm2    TV mean gradient 14 mmHg    Triscuspid Valve Regurgitation Peak Gradient 18 mmHg    PV mean gradient 1 mmHg    RVOT peak bj 0.66 m/s    Ao root annulus 2.64 cm    STJ 2.57 cm    Ascending aorta 2.47 cm    IVC diameter 1.41 cm    Mean e' 0.06 m/s    ZLVIDS -1.32     ZLVIDD -1.96     EF 60 %    TV resting pulmonary artery pressure 21 mmHg    RV TB RVSP 5 mmHg    Est. RA pres 3 mmHg    Narrative      Left Ventricle: The left ventricle is normal in size. Normal wall   thickness. There is concentric remodeling. Normal wall motion. Ejection   fraction by visual approximation is 60%. There is indeterminate diastolic   function.    Right Ventricle: Normal right  ventricular cavity size. Wall thickness   is normal. Right ventricle wall motion  is normal. Systolic function is   normal.    Aortic Valve: The aortic valve is a trileaflet valve. There is mild   aortic valve sclerosis.    Pulmonary Artery: The estimated pulmonary artery systolic pressure is   21 mmHg.    IVC/SVC: Normal venous pressure at 3 mmHg.      and EKG: Reviewed  Assessment and Plan:   Patient s/p recent complex PCI who presents with CP/SOB. Improved/stable. Brilinta switched to Plavix. Continue OMT. Recommend low dose nitropatch if BP permits. Diuretics held given bumped creatinine.    * Acute chest pain  -Presents with SOB/LINN and stabbing heavy CP  -Recent complex PCI of LM, LAD, and LCX at [place] on 4/16/24  -Serial troponin negative  -TTE with normal EF  -Continue ASA, Brilinta, BB, Ranexa  -Will consider addition of nitrates    5/10/24  -SOB improved, will give additional dose of IV Lasix  -CP symptoms also improved  -Continue ASA, Brilinta, BB, Ranexa  -Will plan to add low dose nitropatch IF BP permits    5/11/24   - Creatinine has improved from 1.7 to 1.6   -Still feeling SOB today reports urinating a lot yesterday   -Will give lasix IV dose BID today   - Did not get nitro patch due to low BP      5/12/24  - Kidney function bumped Will give gentle fluids  -Still having SOB will switch brilinta to plavix    -Holding all diuretics    -Had chest pain, troponin negative    -Will try nitropaste again today     5/13/24  -Stable, no CP or SOB this AM  -Brilinta switched to Plavix yesterday  -Continue ASA, Plavix, BB-  -Recommend low dose nitropatch if BP tolerates     Dyspnea on exertion  -Assess response to IV diuresis  -? Related to Brilinta    5/13/24  -Stable/improved today  -Brilinta switched to Plavix    Acute on chronic diastolic congestive heart failure  -BNP mildly elevated and CXR concerning for pulmonary edema  -Additional dose of IV Lasix today  -Continue BB as tolerated  -Hold Entresto  given LINDSAY  -TTE with normal EF    5/10/24  -Clinically improved  -Additional dose of IV Lasix today  -Continue BB    5/13/24  -Stable/improved, diuretics held given bumped creatinine    Acute pulmonary edema  -Additional dose of IV Lasix today, reassess in AM  -TTE with normal EF    5/10/24  -Additional Lasix today, clinically improved    5/13/24  -Stable/improved    LINDSAY (acute kidney injury)  -Monitor with IV diuresis     5/10/24  -Creatinine stable since admission    5/13/24  -Creatinine 1.9    Hypertension  -BP labile  -Continue BB as tolerated  -Hold Entresto given bumped creatinine    Hyperlipidemia  -Statin intolerant    Coronary artery disease of bypass graft of native heart with stable angina pectoris  -See plan under CP      Anemia in chronic illness  -Stable, monitor        VTE Risk Mitigation (From admission, onward)           Ordered     IP VTE HIGH RISK PATIENT  Once         05/08/24 1957     Place sequential compression device  Until discontinued         05/08/24 1957                    Roula Sofia PA-C  Cardiology  [facility] - Telemetry ([facility])

## 2024-05-13 NOTE — ASSESSMENT & PLAN NOTE
Patient's anemia is currently controlled. Has not received any PRBCs to date.   Current CBC reviewed-   Lab Results   Component Value Date    HGB 10.1 (L) 05/13/2024    HCT 33.2 (L) 05/13/2024     Monitor serial CBC and transfuse if patient becomes hemodynamically unstable, symptomatic or H/H drops below 7/21.

## 2024-05-13 NOTE — SUBJECTIVE & OBJECTIVE
Review of Systems   Constitutional: Positive for malaise/fatigue.   HENT: Negative.     Eyes: Negative.    Cardiovascular: Negative.    Respiratory: Negative.     Endocrine: Negative.    Hematologic/Lymphatic: Negative.    Skin: Negative.    Musculoskeletal: Negative.    Gastrointestinal: Negative.    Genitourinary: Negative.    Neurological: Negative.    Psychiatric/Behavioral: Negative.     Allergic/Immunologic: Negative.      Objective:     Vital Signs (Most Recent):  Temp: 98.1 °F (36.7 °C) (05/13/24 0740)  Pulse: 70 (05/13/24 0850)  Resp: 18 (05/13/24 0740)  BP: (!) 113/57 (05/13/24 0850)  SpO2: 95 % (05/13/24 0740) Vital Signs (24h Range):  Temp:  [98.1 °F (36.7 °C)-98.7 °F (37.1 °C)] 98.1 °F (36.7 °C)  Pulse:  [55-77] 70  Resp:  [16-18] 18  SpO2:  [95 %-99 %] 95 %  BP: ()/(51-57) 113/57     Weight: 69.8 kg (153 lb 14.1 oz)  Body mass index is 27.26 kg/m².     SpO2: 95 %         Intake/Output Summary (Last 24 hours) at 5/13/2024 1048  Last data filed at 5/12/2024 1834  Gross per 24 hour   Intake 249.55 ml   Output --   Net 249.55 ml       Lines/Drains/Airways       Peripheral Intravenous Line  Duration                  Peripheral IV - Single Lumen 05/10/24 1055 22 G Left;Posterior Forearm 2 days                       Physical Exam  Vitals and nursing note reviewed.   Constitutional:       General: She is not in acute distress.     Appearance: Normal appearance. She is well-developed. She is not diaphoretic.   HENT:      Head: Normocephalic and atraumatic.   Eyes:      General:         Right eye: No discharge.         Left eye: No discharge.      Pupils: Pupils are equal, round, and reactive to light.   Cardiovascular:      Rate and Rhythm: Normal rate and regular rhythm.      Heart sounds: Normal heart sounds, S1 normal and S2 normal. No murmur heard.  Pulmonary:      Effort: Pulmonary effort is normal.   Musculoskeletal:      Right lower leg: No edema.      Left lower leg: No edema.   Skin:      General: Skin is warm and dry.      Findings: No erythema.   Neurological:      General: No focal deficit present.      Mental Status: She is alert and oriented to person, place, and time.   Psychiatric:         Behavior: Behavior normal.            Significant Labs: CMP   Recent Labs   Lab 05/12/24  0550 05/13/24  0824    135*   K 3.7 4.0    104   CO2 23 19*    117*   BUN 31* 32*   CREATININE 1.9* 1.9*   CALCIUM 8.7 9.3   PROT 7.2  --    ALBUMIN 3.2*  --    BILITOT 0.4  --    ALKPHOS 91  --    AST 13  --    ALT 8*  --    ANIONGAP 13 12   , CBC   Recent Labs   Lab 05/12/24  0550 05/13/24  0824   WBC 10.33 7.76   HGB 10.2* 10.1*   HCT 30.5* 33.2*    332   , Troponin   Recent Labs   Lab 05/11/24  1931   TROPONINI 0.018   , and All pertinent lab results from the last 24 hours have been reviewed.    Significant Imaging: Echocardiogram: Transthoracic echo (TTE) complete ([facility] Only):   Results for orders placed or performed during the hospital encounter of 05/08/24   Echo   Result Value Ref Range    BSA 1.75 m2    LA WIDTH 3.0 cm    RA Width 2.6 cm    LVOT stroke volume 64.84 cm3    LVIDd 3.92 3.5 - 6.0 cm    LV Systolic Volume 22.35 mL    LV Systolic Volume Index 13.0 mL/m2    LVIDs 2.50 2.1 - 4.0 cm    LV Diastolic Volume 66.74 mL    LV Diastolic Volume Index 38.80 mL/m2    IVS 1.09 0.6 - 1.1 cm    LVOT diameter 1.96 cm    LVOT area 3.0 cm2    FS 36 28 - 44 %    Left Ventricle Relative Wall Thickness 0.54 cm    Posterior Wall 1.06 0.6 - 1.1 cm    LV mass 136.56 g    LV Mass Index 79 g/m2    MV Peak E Bj 0.87 m/s    TDI LATERAL 0.07 m/s    TDI SEPTAL 0.05 m/s    E/E' ratio 14.50 m/s    MV Peak A Bj 0.93 m/s    TR Max Bj 2.14 m/s    E/A ratio 0.94     IVRT 65.65 msec    E wave deceleration time 215.38 msec    LV SEPTAL E/E' RATIO 17.40 m/s    LA Volume Index 22.1 mL/m2    LV LATERAL E/E' RATIO 12.43 m/s    LA volume 37.95 cm3    LVOT peak bj 0.98 m/s    Left Ventricular Outflow Tract Mean  Velocity 0.83 cm/s    Left Ventricular Outflow Tract Mean Gradient 2.77 mmHg    RVOT peak VTI 17.2 cm    TAPSE 1.75 cm    LA size 3.17 cm    Left Atrium Minor Axis 4.67 cm    Left Atrium Major Axis 4.72 cm    RA Major Axis 3.57 cm    AV mean gradient 5 mmHg    AV peak gradient 8 mmHg    Ao peak consuelo 1.43 m/s    Ao VTI 24.20 cm    LVOT peak VTI 21.50 cm    AV valve area 2.68 cm²    AV Velocity Ratio 0.69     AV index (prosthetic) 0.89     YANIRA by Velocity Ratio 2.07 cm²    Mr max consuelo 3.28 m/s    MV stenosis pressure 1/2 time 62.46 ms    MV valve area p 1/2 method 3.52 cm2    TV mean gradient 14 mmHg    Triscuspid Valve Regurgitation Peak Gradient 18 mmHg    PV mean gradient 1 mmHg    RVOT peak consuelo 0.66 m/s    Ao root annulus 2.64 cm    STJ 2.57 cm    Ascending aorta 2.47 cm    IVC diameter 1.41 cm    Mean e' 0.06 m/s    ZLVIDS -1.32     ZLVIDD -1.96     EF 60 %    TV resting pulmonary artery pressure 21 mmHg    RV TB RVSP 5 mmHg    Est. RA pres 3 mmHg    Narrative      Left Ventricle: The left ventricle is normal in size. Normal wall   thickness. There is concentric remodeling. Normal wall motion. Ejection   fraction by visual approximation is 60%. There is indeterminate diastolic   function.    Right Ventricle: Normal right ventricular cavity size. Wall thickness   is normal. Right ventricle wall motion  is normal. Systolic function is   normal.    Aortic Valve: The aortic valve is a trileaflet valve. There is mild   aortic valve sclerosis.    Pulmonary Artery: The estimated pulmonary artery systolic pressure is   21 mmHg.    IVC/SVC: Normal venous pressure at 3 mmHg.      and EKG: Reviewed

## 2024-05-14 VITALS
WEIGHT: 153.88 LBS | BODY MASS INDEX: 27.27 KG/M2 | HEART RATE: 64 BPM | DIASTOLIC BLOOD PRESSURE: 63 MMHG | RESPIRATION RATE: 15 BRPM | HEIGHT: 63 IN | SYSTOLIC BLOOD PRESSURE: 133 MMHG | TEMPERATURE: 99 F | OXYGEN SATURATION: 99 %

## 2024-05-14 PROBLEM — R06.09 DYSPNEA ON EXERTION: Status: RESOLVED | Noted: 2024-05-09 | Resolved: 2024-05-14

## 2024-05-14 PROBLEM — R07.9 ACUTE CHEST PAIN: Status: RESOLVED | Noted: 2024-05-08 | Resolved: 2024-05-14

## 2024-05-14 PROBLEM — N17.9 AKI (ACUTE KIDNEY INJURY): Status: RESOLVED | Noted: 2024-05-08 | Resolved: 2024-05-14

## 2024-05-14 LAB
ALBUMIN SERPL BCP-MCNC: 2.8 G/DL (ref 3.5–5.2)
ANION GAP SERPL CALC-SCNC: 9 MMOL/L (ref 8–16)
BUN SERPL-MCNC: 27 MG/DL (ref 8–23)
CALCIUM SERPL-MCNC: 9 MG/DL (ref 8.7–10.5)
CHLORIDE SERPL-SCNC: 109 MMOL/L (ref 95–110)
CO2 SERPL-SCNC: 21 MMOL/L (ref 23–29)
CREAT SERPL-MCNC: 1.4 MG/DL (ref 0.5–1.4)
EST. GFR  (NO RACE VARIABLE): 40 ML/MIN/1.73 M^2
GLUCOSE SERPL-MCNC: 98 MG/DL (ref 70–110)
PHOSPHATE SERPL-MCNC: 2.7 MG/DL (ref 2.7–4.5)
POTASSIUM SERPL-SCNC: 4 MMOL/L (ref 3.5–5.1)
SODIUM SERPL-SCNC: 139 MMOL/L (ref 136–145)

## 2024-05-14 PROCEDURE — 99232 SBSQ HOSP IP/OBS MODERATE 35: CPT | Mod: ,,, | Performed by: INTERNAL MEDICINE

## 2024-05-14 PROCEDURE — 36415 COLL VENOUS BLD VENIPUNCTURE: CPT | Performed by: FAMILY MEDICINE

## 2024-05-14 PROCEDURE — 80069 RENAL FUNCTION PANEL: CPT | Performed by: FAMILY MEDICINE

## 2024-05-14 PROCEDURE — 25000003 PHARM REV CODE 250: Performed by: PHYSICIAN ASSISTANT

## 2024-05-14 PROCEDURE — 97530 THERAPEUTIC ACTIVITIES: CPT

## 2024-05-14 PROCEDURE — 97162 PT EVAL MOD COMPLEX 30 MIN: CPT

## 2024-05-14 PROCEDURE — 97166 OT EVAL MOD COMPLEX 45 MIN: CPT

## 2024-05-14 PROCEDURE — 25000003 PHARM REV CODE 250: Performed by: NURSE PRACTITIONER

## 2024-05-14 PROCEDURE — 25000003 PHARM REV CODE 250: Performed by: STUDENT IN AN ORGANIZED HEALTH CARE EDUCATION/TRAINING PROGRAM

## 2024-05-14 PROCEDURE — A4216 STERILE WATER/SALINE, 10 ML: HCPCS | Performed by: NURSE PRACTITIONER

## 2024-05-14 RX ORDER — METOPROLOL SUCCINATE 50 MG/1
25 TABLET, EXTENDED RELEASE ORAL 2 TIMES DAILY
Start: 2024-05-14 | End: 2024-05-22 | Stop reason: SDUPTHER

## 2024-05-14 RX ADMIN — FOLIC ACID 1 MG: 1 TABLET ORAL at 08:05

## 2024-05-14 RX ADMIN — RANOLAZINE 500 MG: 500 TABLET, EXTENDED RELEASE ORAL at 08:05

## 2024-05-14 RX ADMIN — GEMFIBROZIL 600 MG: 600 TABLET ORAL at 05:05

## 2024-05-14 RX ADMIN — METOPROLOL SUCCINATE 25 MG: 25 TABLET, FILM COATED, EXTENDED RELEASE ORAL at 08:05

## 2024-05-14 RX ADMIN — PANTOPRAZOLE SODIUM 40 MG: 40 TABLET, DELAYED RELEASE ORAL at 08:05

## 2024-05-14 RX ADMIN — Medication 3 ML: at 05:05

## 2024-05-14 RX ADMIN — CLOPIDOGREL BISULFATE 75 MG: 75 TABLET ORAL at 08:05

## 2024-05-14 RX ADMIN — ASPIRIN 81 MG: 81 TABLET, COATED ORAL at 08:05

## 2024-05-14 NOTE — DISCHARGE INSTRUCTIONS
Homehealth with physical/occupational therapy order has been ordered. Someone will be in contact.    A nurse practitioner may be contacting you to assess your status post-hospitalization.     You have been started on new medication(s) from this hospitalization. Please take as directed and schedule hospital follow up appointments with your primary providers.

## 2024-05-14 NOTE — DISCHARGE SUMMARY
[facility] Medicine  Discharge Summary      Patient Name: Paola Wood  MRN: [id number]  LEA: [id number]  Patient Class: IP- Inpatient  Admission Date: 5/8/2024  Hospital Length of Stay: 5 days  Discharge Date and Time:  05/14/2024 11:05 AM  Attending Physician: Eli Dee MD   Discharging Provider: Eli Dee MD  Primary Care Provider: Grace Duron MD    Primary Care Team: Networked reference to record PCT     HPI:   Patient is a 73-year-old female with past medical history significant for CAD status post CABG about 2 years ago and multiple coronary stents in the past -- most recently at [facility] on 4/16/2024 with stents placed to LAD, left circumflex, and left main, diastolic congestive heart failure, hypertension, asthma, allergic rhinitis, chronic back pain, chronic dyspnea, TIA, GERD,  hyperlipidemia with statin intolerance, and chronic anemia who presented to ED with complaint of severe chest pain, rated 10/10.  She reports that she started feeling short of breath about 3 days ago and then started with upper back pain yesterday which radiate to through to her upper left chest and goes up into her left neck.  She states that the pain was intermittent, however now has become constant.   Initial vital signs in ED blood pressure 98/50, heart rate 69, afebrile, saturating 100% on room air.   Blood pressure did improve to the 130s systolic.   EKG showed normal sinus rhythm with sinus arrhythmia and nonspecific T-wave abnormality with T-wave inversion evident in lateral leads, no STEMI.  Chest x-ray showed mild perihilar interstitial hazy opacity suggestive of pulmonary edema.  Lab workup significant for hemoglobin 9.6, hematocrit 29.9, CO2 21, BUN 31, creatinine 1.7, , troponin 0.011.  ED nurses present in the room giving her morphine and Zofran at this time for her complaint of chest pain rated 9.5/10 currently. she was also given IV Lasix 60 mg while in  ED. hospital medicine is consulted for admission due to chest pain.    * No surgery found *      Hospital Course:   72 y/o female presents to ED with complaint of severe chest pain, rated 10/10.   EKG showed normal sinus rhythm with sinus arrhythmia and nonspecific T-wave abnormality with T-wave inversion evident in lateral leads, no STEMI.   Chest x-ray: showed mild perihilar interstitial hazy opacity suggestive of pulmonary edema.   Serial troponin negative   Cardiology consulted  ECHO: EF 60%, Indeterminate diastolic function and normal systolic function. PA pressure 21mmHg. Mild aortic valve sclerosis.   BP labile will continue BB as tolerated but hold Entresto given bumped creatinine. Addition dose of IV lasix 40mg given today and evaluate response in a.m. as patient continues to report severe CP and SOB.   Additional dose of IV lasix 40mg dose ordered today 5/10, Cardiology recommends addition of  low dose nitropatch IF BP permits. Will continue to assess response to medication adjustments per cardiology recommendations. Patient reports improvement in Chest pain states it is 9/10 now, will continue PRN pain medications as BP allows.   Cardiology ordered BID Lasix 40mg IV BID as the patient is continuing to report SOB.   Patient reported one episode of severe chest pain 5/11, PRN morphine given, STAT EKG and troponin ordered. Cardiology Dr. Carr aware and reviewed troponin and EKG. Patient reported improvement with morphine.     Patient BUN 31 & Crit 1.9 elevated from previous, held IV lasix. Consulted cardiology, Dr. Carr recommends 250ml NS bolus and will switch the brilinta to plavix as patient continues to report SOB. Will continue to trend BUN & Crit with morning labs.   5/13 creatinine remains 1.9. physical/occupational therapy consulted. Anticipate discharge tomorrow.     5/14  Creatinine improved to 1.4, close to baseline. Denies chest pain or dyspnea. Homehealth physical/occupational therapy  ordered on discharge to monitor blood pressure and creatinine due to holding blood pressure medication(s) on discharge.     No acute distress. No respiratory distress. Soft abdomen on palpation. Clear lungs on auscultation. AO3. Mood and affect within normal limits.     Patient seen and evaluated by me. Patient was determined to be suitable for discharge. Patient deemed stable for discharge to home with homehealth physical/occupational therapy and nurse practitioner to visit home program.       Goals of Care Treatment Preferences:  Code Status: Full Code      Consults:   Consults (From admission, onward)          Status Ordering Provider     Inpatient consult to Cardiology  Once        Provider:  Roula Sofia PA-C Completed STANLEY, PAMELA            No new Assessment & Plan notes have been filed under this hospital service since the last note was generated.  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] 12/22/2014 Yes    Anemia in chronic illness [D63.8] 02/25/2013 Yes    Hyperlipidemia [E78.5] 03/20/2012 Yes    Coronary artery disease of bypass graft of native heart with stable angina pectoris [I25.708] 03/20/2012 Yes    Acid reflux [K21.9] 03/20/2012 Yes      Problems Resolved During this Admission:    Diagnosis Date Noted Date Resolved POA    PRINCIPAL PROBLEM:  Acute chest pain [R07.9] 05/08/2024 05/14/2024 Yes    Dyspnea on exertion [R06.09] 05/09/2024 05/14/2024 Yes    LINDSAY (acute kidney injury) [N17.9] 05/08/2024 05/14/2024 Yes       Discharged Condition: stable    Disposition:     Follow Up:   Follow-up Information       [facility] - Follow up.     Specialty: Home Health Services  Contact information:  [address] [facility] [address]  [phone]               Grace Duron MD. Schedule an appointment as soon as possible for a visit in 3 day(s).    Specialty: Family Medicine  Why: hospital follow up  Contact information:  [address]  [facility] [address]  [phone]               Keanu Krause Jr., MD. Schedule an appointment as soon as possible for a visit in 1 week(s).    Specialty: Cardiology  Why: hospital follow up  Contact information:  [address] [id number]  [phone]                           Patient Instructions:      Ambulatory referral/consult to [facility]     Ambulatory referral/consult to Home Health   Standing Status: Future   Referral Priority: Routine Referral Type: Home Health   Referral Reason: Specialty Services Required   Requested Specialty: Home Health Services   Number of Visits Requested: 1       Significant Diagnostic Studies: Labs: CMP   Recent Labs   Lab 05/13/24  0824 05/14/24  0747   * 139   K 4.0 4.0    109   CO2 19* 21*   * 98   BUN 32* 27*   CREATININE 1.9* 1.4   CALCIUM 9.3 9.0   ALBUMIN  --  2.8*   ANIONGAP 12 9   , CBC   Recent Labs   Lab 05/13/24  0824   WBC 7.76   HGB 10.1*   HCT 33.2*      , INR   Lab Results   Component Value Date    INR 1.1 09/15/2023    INR 2.2 (H) 10/28/2010   , Lipid Panel   Lab Results   Component Value Date    CHOL 363 (H) 04/15/2024    HDL 46 04/15/2024    LDLCALC 286 (H) 04/15/2024    TRIG 154 04/15/2024    CHOLHDL 7.9 (H) 04/15/2024   , Troponin   Recent Labs   Lab 05/09/24  0216 05/09/24  0540 05/11/24  1931   TROPONINI 0.025 0.019 0.018   , and All labs within the past 24 hours have been reviewed  Radiology: X-Ray: CXR: portable with pulmonary edema   Cardiac Graphics: Echocardiogram: Transthoracic echo (TTE) complete (Cupid Only):   Results for orders placed or performed during the hospital encounter  of 05/08/24   Echo   Result Value Ref Range    BSA 1.75 m2    LA WIDTH 3.0 cm    RA Width 2.6 cm    LVOT stroke volume 64.84 cm3    LVIDd 3.92 3.5 - 6.0 cm    LV Systolic Volume 22.35 mL    LV Systolic Volume Index 13.0 mL/m2    LVIDs 2.50 2.1 - 4.0 cm    LV Diastolic Volume 66.74 mL    LV Diastolic Volume Index 38.80 mL/m2    IVS 1.09 0.6 - 1.1 cm    LVOT diameter 1.96 cm    LVOT area 3.0 cm2    FS 36 28 - 44 %    Left Ventricle Relative Wall Thickness 0.54 cm    Posterior Wall 1.06 0.6 - 1.1 cm    LV mass 136.56 g    LV Mass Index 79 g/m2    MV Peak E Bj 0.87 m/s    TDI LATERAL 0.07 m/s    TDI SEPTAL 0.05 m/s    E/E' ratio 14.50 m/s    MV Peak A Bj 0.93 m/s    TR Max Bj 2.14 m/s    E/A ratio 0.94     IVRT 65.65 msec    E wave deceleration time 215.38 msec    LV SEPTAL E/E' RATIO 17.40 m/s    LA Volume Index 22.1 mL/m2    LV LATERAL E/E' RATIO 12.43 m/s    LA volume 37.95 cm3    LVOT peak bj 0.98 m/s    Left Ventricular Outflow Tract Mean Velocity 0.83 cm/s    Left Ventricular Outflow Tract Mean Gradient 2.77 mmHg    RVOT peak VTI 17.2 cm    TAPSE 1.75 cm    LA size 3.17 cm    Left Atrium Minor Axis 4.67 cm    Left Atrium Major Axis 4.72 cm    RA Major Axis 3.57 cm    AV mean gradient 5 mmHg    AV peak gradient 8 mmHg    Ao peak bj 1.43 m/s    Ao VTI 24.20 cm    LVOT peak VTI 21.50 cm    AV valve area 2.68 cm²    AV Velocity Ratio 0.69     AV index (prosthetic) 0.89     YANIRA by Velocity Ratio 2.07 cm²    Mr max bj 3.28 m/s    MV stenosis pressure 1/2 time 62.46 ms    MV valve area p 1/2 method 3.52 cm2    TV mean gradient 14 mmHg    Triscuspid Valve Regurgitation Peak Gradient 18 mmHg    PV mean gradient 1 mmHg    RVOT peak bj 0.66 m/s    Ao root annulus 2.64 cm    STJ 2.57 cm    Ascending aorta 2.47 cm    IVC diameter 1.41 cm    Mean e' 0.06 m/s    ZLVIDS -1.32     ZLVIDD -1.96     EF 60 %    TV resting pulmonary artery pressure 21 mmHg    RV TB RVSP 5 mmHg    Est. RA pres 3 mmHg    Narrative      Left  Ventricle: The left ventricle is normal in size. Normal wall   thickness. There is concentric remodeling. Normal wall motion. Ejection   fraction by visual approximation is 60%. There is indeterminate diastolic   function.    Right Ventricle: Normal right ventricular cavity size. Wall thickness   is normal. Right ventricle wall motion  is normal. Systolic function is   normal.    Aortic Valve: The aortic valve is a trileaflet valve. There is mild   aortic valve sclerosis.    Pulmonary Artery: The estimated pulmonary artery systolic pressure is   21 mmHg.    IVC/SVC: Normal venous pressure at 3 mmHg.         Pending Diagnostic Studies:       None           Medications:  Reconciled Home Medications:      Medication List        CHANGE how you take these medications      ranolazine 500 MG Tb12  Commonly known as: RANEXA  Take 500 mg by mouth 2 (two) times daily.  What changed: Another medication with the same name was removed. Continue taking this medication, and follow the directions you see here.            CONTINUE taking these medications      alirocumab 75 mg/mL Pnij  Commonly known as: PRALUENT PEN  Inject 1 mL (75 mg total) into the skin every 14 (fourteen) days.     amitriptyline 150 MG Tab  Commonly known as: ELAVIL  Take 150 mg by mouth every evening.     amLODIPine 5 MG tablet  Commonly known as: NORVASC  TAKE 1 TABLET BY MOUTH ONCE DAILY     aspirin 81 MG EC tablet  Commonly known as: ECOTRIN  Take 81 mg by mouth once daily.     cloNIDine 0.2 MG tablet  Commonly known as: CATAPRES  Take 1 tablet (0.2 mg total) by mouth as needed.     clopidogreL 75 mg tablet  Commonly known as: PLAVIX  Take 75 mg by mouth once daily.     cyproheptadine 4 mg tablet  Commonly known as: PERIACTIN  Take 4 mg by mouth 3 (three) times daily as needed.     ENTRESTO 24-26 mg per tablet  Generic drug: sacubitriL-valsartan  Take 1 tablet by mouth 2 (two) times daily.     folic acid 1 MG tablet  Commonly known as: FOLVITE  Take 1  tablet (1 mg total) by mouth once daily. FOLIC ACID 1 MG TABS     furosemide 20 MG tablet  Commonly known as: LASIX  Take 20 mg by mouth.     gemfibroziL 600 MG tablet  Commonly known as: LOPID  Take 1 tablet (600 mg total) by mouth in the morning and 1 tablet (600 mg total) in the evening. Take before meals.     isosorbide mononitrate 30 MG 24 hr tablet  Commonly known as: IMDUR  Take 30 mg by mouth every morning.     nitroGLYCERIN 0.4 MG SL tablet  Commonly known as: NITROSTAT  USE AS DIRECTED     oxyCODONE-acetaminophen  mg per tablet  Commonly known as: PERCOCET  Take 1 tablet by mouth every 4 (four) hours as needed.     pantoprazole 40 MG tablet  Commonly known as: PROTONIX  Take 1 tablet (40 mg total) by mouth once daily. PROTONIX 40 MG TBEC     potassium chloride SA 20 MEQ tablet  Commonly known as: K-DUR,KLOR-CON  Take 20 mEq by mouth once daily.            STOP taking these medications      BRILINTA 90 mg tablet  Generic drug: ticagrelor     chlorthalidone 25 MG Tab  Commonly known as: HYGROTEN     hydrALAZINE 50 MG tablet  Commonly known as: APRESOLINE     losartan 50 MG tablet  Commonly known as: COZAAR     metoprolol succinate 50 MG 24 hr tablet  Commonly known as: TOPROL XL              Indwelling Lines/Drains at time of discharge:   Lines/Drains/Airways       None                   Time spent on the discharge of patient: 31 minutes         Eli Dee MD  Department of Hospital Medicine  [facility] - Telemetry ([facility])

## 2024-05-14 NOTE — NURSING
Patients PIV removed, AVS given and explained in detail. Patient awaiting staff transport to d/c home

## 2024-05-14 NOTE — PT/OT/SLP EVAL
Physical Therapy Evaluation and Treatment    Patient Name:  Paola Wood   MRN:  [id number]    Recommendations:     Discharge Recommendations: Low Intensity Therapy (WITH 24 HOUR CARE FOR SAFETY)   Discharge Equipment Recommendations: none   Barriers to discharge: None    Assessment:     Paola Wood is a 73 y.o. female admitted with a medical diagnosis of Acute chest pain.  She presents with the following impairments/functional limitations: weakness, impaired endurance, gait instability, impaired balance, decreased coordination, impaired cognition, decreased safety awareness.    Rehab Prognosis: Good; patient would benefit from acute skilled PT services to address these deficits and reach maximum level of function.    Recent Surgery: * No surgery found *     Plan:     During this hospitalization, patient to be seen 3 x/week to address the identified rehab impairments via gait training, therapeutic activities, therapeutic exercises and progress toward the following goals:    Plan of Care Expires:  05/28/24    Subjective     Chief Complaint: DID NOT WANT TO GET OOB, PT LETHARGIC AND FALLING ASLEEP DURING INTERVIEW, REQUIRED ENCOURAGEMENT TO PARTICIPATE  Patient/Family Comments/goals:   Pain/Comfort:  Pain Rating 1: 0/10    Patients cultural, spiritual, [employer]ist conflicts given the current situation:      Living Environment:  PT LIVES WITH DAUGHTER AND GRAND DAUGHTER, PT REPORTS HER NIECE STAYS WITH HER WHEN FAMILY AWAY AT WORK, PT REPORTS SHE LIVES IN 1 STORY HOUSE WITH 15 STEPS TO ENTER WITH RAIL, AMB INDEP COMMUNITY DISTANCES, USES RW AS NEEDED, DOES NOT DRIVE, RETIRED, INDEP WITH ADL'S  Prior to admission, patients level of function was TOM.  Equipment used at home: walker, rolling.  DME owned (not currently used): none.  Upon discharge, patient will have assistance from FAMILY.    Objective:     Communicated with NURSE MAGAÑA prior to session.  Patient found supine with telemetry, peripheral  "IV  upon PT entry to room.    General Precautions: Standard, fall  Orthopedic Precautions:N/A   Braces: N/A  Respiratory Status: Room air    Exams:  Cognitive Exam:  Patient is oriented to Person, Place, and PT IS LIMITED HISTORIAN, INCONSISTENT ANSWERING QUESTIONS, GETS AGITATED WITH QUESTIONS, APPEARS MEMORY IMPAIRED  Postural Exam:  Patient presented with the following abnormalities:    -       Rounded shoulders  Sensation:    -       Intact  RLE ROM: WFL  RLE Strength: WFL  LLE ROM: WFL  LLE Strength: WFL    Functional Mobility:  Bed Mobility:     Rolling Left:  supervision  Scooting: supervision  Supine to Sit: supervision  Transfers:     Sit to Stand:  stand by assistance with no AD  Bed to Chair: stand by assistance with  no AD  using  Step Transfer  Gait: PT AMB 80' X 2 TRIALS NO AD WITH CGA, MILDLY UNSTEADY DUE TO WEDGE SLIPPERS THAT PT WANTED TO WEAR, EDUCATED ON IMPORTANCE OF SAFE FOOT WEAR, 2 LOB BUT PT ABLE TO SELF CORRECT, CUES FOR UPRIGHT POSTURE AND ATTENTION TO TASK  Balance: FAIR SITTING BALANCE, FAIR- DYNAMIC BALANCE DURING GAIT  PT EDUCATED IN BLE THEREX TO PERFORM WHILE SEATED IN CHAIR: HIP FLEX/EXT, LAQ, AP'S    AM-PAC 6 CLICK MOBILITY  Total Score:20     Treatment & Education:  PT EDUCATION:  - ROLE OF P.T. AND POC IN ACUTE CARE HOSPITAL SETTING  - ENCOURAGED TO INCREASE TIME OOB IN CHAIR TO TOLERANCE   - TO CONTINUE THERAPUETIC EXERCISES THROUGHOUT THE DAY TO INCREASE ACTIVITY TOLERANCE AND DECREASE RISK FOR PNEUMONIA AND BLOOD CLOTS: HIP FLEX/EXT, HIP ABD/ADD, QUAD SET, HEEL SLIDE, AP  - RISK FOR FALLS DUE TO GENERALIZED WEAKNESS, EDUCATED ON "CALL DON'T FALL", ENCOURAGED TO CALL FOR ASSISTANCE WITH ALL NEEDS SUCH AS BED<>CHAIR TRANSFERS OR TRIPS TO BATHROOM, PT AGREEABLE TO SAFETY PRECAUTIONS    Patient left up in chair with all lines intact, call button in reach, chair alarm on, and NURSE notified.    GOALS:   Multidisciplinary Problems       Physical Therapy Goals          Problem: " Physical Therapy    Goal Priority Disciplines Outcome Goal Variances Interventions   Physical Therapy Goal     PT, PT/OT      Description: LTG'S TO BE MET IN 14 DAYS (5-28-24)  PT WILL BE TOM FOR BED MOBILITY  PT WILL BE TOM FOR BED<>CHAIR TF'S  PT WILL  FEET NO AD TOM  PT WILL INC AMPAC SCORE BY 2 POINTS TO PROGRESS GROSS FUNC MOBILITY                         History:     Past Medical History:   Diagnosis Date    Allergy     AR (allergic rhinitis)     Asthma     CAD (coronary artery disease)     CHF (congestive heart failure)     Chronic pain     neck/back/arm/knee    GERD (gastroesophageal reflux disease)     Hypertension     Seizures     SOB (shortness of breath)     TIA (transient ischemic attack)        Past Surgical History:   Procedure Laterality Date    CATARACT EXTRACTION      CORONARY ANGIOPLASTY WITH STENT PLACEMENT      CORONARY ARTERY BYPASS GRAFT      HYSTERECTOMY  1983    partial (Ovaries removed 1998)    OOPHORECTOMY  1998       Time Tracking:     PT Received On: 05/14/24  PT Start Time: 0835     PT Stop Time: 0905  PT Total Time (min): 30 min     Billable Minutes: Evaluation 15 and Therapeutic Activity 15    05/14/2024

## 2024-05-14 NOTE — PT/OT/SLP EVAL
"Occupational Therapy Evaluation and Treatment    Name: Paola Wood  MRN: [id number]  Admitting Diagnosis: Acute chest pain  Recent Surgery: * No surgery found *      Recommendations:     Discharge Recommendations: Low Intensity Therapy (24/7 SPV and A)  Level of Assistance Recommended: 24 hours light assistance  Discharge Equipment Recommendations: bath bench (recommending use of RW at d/c)  Barriers to discharge: None    Assessment:     Paola Wood is a 73 y.o. female with a medical diagnosis of Acute chest pain. She presents with performance deficits affecting function including weakness, impaired endurance, impaired self care skills, impaired functional mobility, impaired balance, decreased safety awareness, impaired cardiopulmonary response to activity.    Rehab Prognosis: Fair; patient would benefit from acute OT services to address these deficits and reach maximum level of function.    Plan:     Patient to be seen 2 x/week to address the above listed problems via self-care/home management, therapeutic activities, therapeutic exercises  Plan of Care Expires: 05/28/24  Plan of Care Reviewed with: patient    Subjective     Chief Complaint: Reported "They know that I can move around."  Patient Comments/Goals: none reported  Pain/Comfort:  Pain Rating 1: 0/10    Social History:  Living Environment: Patient lives with their daughter and granddaughter  in  [address] [place] [address]  with  2nd floor BA/BR.  Prior Level of Function: Prior to admission, patient was independent with ADLs and community distance ambulation.  Roles and Routines: Patient was not driving and retired prior to admission.  Equipment Used at Home: none  DME owned (not currently used):  reports PRN use of RW  Assistance Upon Discharge: family    Objective:     Communicated with nurse, Jess, prior to session. Patient found supine with telemetry, peripheral IV upon OT entry to room.    General Precautions: Standard, fall   Orthopedic " Precautions: N/A   Braces: N/A    Respiratory Status: Room air    Occupational Performance    Gait belt applied - Yes    Bed Mobility:   Supine to sit from right side of bed with supervision    Functional Mobility/Transfers:  Sit <> Stand Transfer with stand by assistance with no AD  Bed <> Chair Transfer using Step Transfer technique with contact guard assistance with no AD  Functional Mobility: Patient completed x90ft x2 trials functional mobility with CGA and no AD to increase dynamic standing balance and activity tolerance needed for ADL completion.  Noted to have x2 LOB, however, able to self correct with CGA  Provided with v/c for technique with transfers to increase safety and independence with completion.    Activities of Daily Living:  Feeding: modified independence  Grooming: set up assistance  Completed from bedside chair/tray    Cognitive/Visual Perceptual:  Cognitive/Psychosocial Skills:    -     Oriented to: Person and Place  -     Follows Commands/attention: Easily distracted and Follows one-step commands    Physical Exam:  Balance:    -     Sitting: supervision  -     Standing: contact guard assistance  Upper Extremity Range of Motion:     -       Right Upper Extremity: WFL  -       Left Upper Extremity: WFL  Upper Extremity Strength:    -       Right Upper Extremity: Deficits: grossly 4+/5  -       Left Upper Extremity: Deficits: grossly 4+/5   Strength:    -       Right Upper Extremity: WFL  -       Left Upper Extremity: WFL  Declined B UE sensation deficits    AMPAC 6 Click ADL:  AMPAC Total Score: 21    Treatment & Education:  Therapist provided facilitation and instruction of proper body mechanics, energy conservation, and fall prevention strategies during tasks listed above  Patient educated on role of OT, POC, and goals for therapy  Patient educated on importance of OOB activities with staff member assistance and sitting OOB majority of the day  Encouraged completion of B UE AROM therex  throughout the day to increase functional strength and activity tolerance needed for ADL completion.    Patient left up in chair with all lines intact, call button in reach, and chair alarm on.    GOALS:   Multidisciplinary Problems       Occupational Therapy Goals          Problem: Occupational Therapy    Goal Priority Disciplines Outcome Interventions   Occupational Therapy Goal     OT, PT/OT     Description: Goals to be met by: 5/28/24     Patient will increase functional independence with ADLs by performing:    Toileting from toilet with [place] for hygiene and clothing management.   Toilet transfer to toilet with [place].  Increased functional strength in B UE grossly by 1/2 MM grade.                         History:     Past Medical History:   Diagnosis Date    Allergy     AR (allergic rhinitis)     Asthma     CAD (coronary artery disease)     CHF (congestive heart failure)     Chronic pain     neck/back/arm/knee    GERD (gastroesophageal reflux disease)     Hypertension     Seizures     SOB (shortness of breath)     TIA (transient ischemic attack)          Past Surgical History:   Procedure Laterality Date    CATARACT EXTRACTION      CORONARY ANGIOPLASTY WITH STENT PLACEMENT      CORONARY ARTERY BYPASS GRAFT      HYSTERECTOMY  1983    partial (Ovaries removed 1998)    OOPHORECTOMY  1998       Time Tracking:     OT Date of Treatment: 05/14/24  OT Start Time: 0825  OT Stop Time: 0850  OT Total Time (min): 25 min    Billable Minutes: Evaluation 15 and Therapeutic Activity 10    Cha Will, OT  5/14/2024

## 2024-05-14 NOTE — ASSESSMENT & PLAN NOTE
-See plan under CP    05/14/24 improved SOB after switched from brillinta to Plavix  No angina  Continue current meds  F/u as OP at cardiology office

## 2024-05-14 NOTE — PLAN OF CARE
P.T. EVAL COMPLETE.  PT CURRENTLY REQUIRES SPV FOR BED MOBILITY AND TF'S, CGA FOR GAIT NO AD.  P.T. RECOMMENDS LOW INTENSITY THERAPY UPON DISCHARGE WITH 24 HOUR CARE FOR SAFETY

## 2024-05-14 NOTE — PLAN OF CARE
[facility] - Telemetry (Hospital)  Discharge Final Note    Primary Care Provider: Grace Duron MD    Expected Discharge Date: 5/14/2024    Final Discharge Note (most recent)       Final Note - 05/14/24 1020          Final Note    Assessment Type Final Discharge Note     Anticipated Discharge Disposition Home-Health Care [facility]     Hospital Resources/Appts/Education Provided Post-Acute resouces added to AVS;Appointments scheduled and added to AVS        Post-Acute Status    Post-Acute Authorization Home Health     Home Health Status Set-up Complete/Auth obtained     Discharge Delays None known at this time                     Important Message from Medicare             Contact Info       [facility]   Specialty: Home Health Services    [address] [place] [id number]   Phone: [phone]       Next Steps: Follow up          PCP follow up scheduled for Wednesday May 22, 2024 2:20 PM. Therapy recommendations are for home health. Home health referrals sent. [facility] accepted patient. Message sent to provider for HH orders. No other CM needs.

## 2024-05-14 NOTE — PLAN OF CARE
ongoing (interventions implemented as appropriate)  pt axo4 VSS  pt able to make needs known  pt remained afebrile throughout this shift  pt remained free of falls this shift  pt denies pain this shift  plan of care reviewed pt verbalizes understanding  Pt moving/turning independent. Frequent weight shifting encouraged  Pt sinus rhythm of monitor  Bed low, side rails up x 2, wheels locked,call light in reach  Hourly rounding completed  Will continue to observe

## 2024-05-14 NOTE — PLAN OF CARE
A205/A205 PAULeonor Wood is a 73 y.o.female admitted on 5/8/2024 for Acute chest pain   Code Status: Full Code MRN: [id number]   Review of patient's allergies indicates:   Allergen Reactions    Ace inhibitors Swelling     Other reaction(s): tongue swelling  Other reaction(s): Other (See Comments)  Other reaction(s): tongue swelling      Bacitracin     Ezetimibe     Lisinopril      Other reaction(s): Not available    Neomycin     Niacin     Polymyxin b     Atorvastatin Rash    Rosuvastatin Nausea And Vomiting    Statins-hmg-coa reductase inhibitors Nausea And Vomiting     Other reaction(s): Not available     Past Medical History:   Diagnosis Date    Allergy     AR (allergic rhinitis)     Asthma     CAD (coronary artery disease)     CHF (congestive heart failure)     Chronic pain     neck/back/arm/knee    GERD (gastroesophageal reflux disease)     Hypertension     Seizures     SOB (shortness of breath)     TIA (transient ischemic attack)       PRN meds    acetaminophen, 650 mg, Q4H PRN  aluminum-magnesium hydroxide-simethicone, 30 mL, QID PRN  dextrose 10%, 12.5 g, PRN  dextrose 10%, 25 g, PRN  glucagon (human recombinant), 1 mg, PRN  glucose, 16 g, PRN  glucose, 24 g, PRN  melatonin, 6 mg, Nightly PRN  morphine, 2 mg, Q4H PRN  naloxone, 0.02 mg, PRN  ondansetron, 4 mg, Q6H PRN  oxyCODONE-acetaminophen, 1 tablet, Q6H PRN  polyethylene glycol, 17 g, Daily PRN  promethazine, 25 mg, Q6H PRN      Chart check completed. Will continue plan of care.         Ronda Coma Scale Score: 15     Lead Monitored: Lead II Rhythm: normal sinus rhythm Frequency/Ectopy: PACs  Cardiac/Telemetry Box Number: [id number]  VTE Required Core Measure: Pharmacological prophylaxis initiated/maintained Last Bowel Movement: 05/12/24  Diet Cardiac     Jassi Score: 20  Fall Risk Score: 14  Accucheck []   Freq?      Lines/Drains/Airways       Peripheral Intravenous Line  Duration                  Peripheral IV - Single Lumen 05/10/24 1055 22 G  Left;Posterior Forearm 3 days                       Problem: Adult Inpatient Plan of Care  Goal: Plan of Care Review  Outcome: Met  Goal: Patient-Specific Goal (Individualized)  Outcome: Met  Goal: Absence of Hospital-Acquired Illness or Injury  Outcome: Met  Goal: Optimal Comfort and Wellbeing  Outcome: Met  Goal: Readiness for Transition of Care  Outcome: Met     Problem: Infection  Goal: Absence of Infection Signs and Symptoms  Outcome: Met     Problem: Acute Kidney Injury/Impairment  Goal: Fluid and Electrolyte Balance  Outcome: Met  Goal: Improved Oral Intake  Outcome: Met  Goal: Effective Renal Function  Outcome: Met     Problem: Chest Pain  Goal: Resolution of Chest Pain Symptoms  Outcome: Met     Problem: Fall Injury Risk  Goal: Absence of Fall and Fall-Related Injury  Outcome: Met     Problem: Skin Injury Risk Increased  Goal: Skin Health and Integrity  Outcome: Met

## 2024-05-14 NOTE — PLAN OF CARE
OT lulu completed. Sup>sit SPV, sit>stand SBA, functional mobility x90ft x2 trials without AD and CGA, step>pivot to bedside chair with CGA. Recommending low intensity intervention with 24/7 SPV and A at d/c.

## 2024-05-14 NOTE — SUBJECTIVE & OBJECTIVE
"  ROS  Objective:     Vital Signs (Most Recent):  Temp: 98.5 °F (36.9 °C) (05/14/24 1121)  Pulse: 64 (05/14/24 1121)  Resp: 15 (05/14/24 1121)  BP: 133/63 (05/14/24 1121)  SpO2: 99 % (05/14/24 1121) Vital Signs (24h Range):  Temp:  [98.2 °F (36.8 °C)-99 °F (37.2 °C)] 98.5 °F (36.9 °C)  Pulse:  [60-78] 64  Resp:  [15-18] 15  SpO2:  [98 %-99 %] 99 %  BP: (110-164)/(56-74) 133/63     Weight: 69.8 kg (153 lb 14.1 oz)  Body mass index is 27.26 kg/m².     SpO2: 99 %       No intake or output data in the 24 hours ending 05/14/24 1853    Lines/Drains/Airways       None                      Physical Exam  Vitals and nursing note reviewed.   Constitutional:       General: She is not in acute distress.     Appearance: Normal appearance. She is well-developed. She is not diaphoretic.   HENT:      Head: Normocephalic and atraumatic.   Eyes:      General:         Right eye: No discharge.         Left eye: No discharge.      Pupils: Pupils are equal, round, and reactive to light.   Cardiovascular:      Rate and Rhythm: Normal rate and regular rhythm.      Heart sounds: Normal heart sounds, S1 normal and S2 normal. No murmur heard.  Pulmonary:      Effort: Pulmonary effort is normal.   Musculoskeletal:      Right lower leg: No edema.      Left lower leg: No edema.   Skin:     General: Skin is warm and dry.      Findings: No erythema.   Neurological:      General: No focal deficit present.      Mental Status: She is alert and oriented to person, place, and time.   Psychiatric:         Behavior: Behavior normal.            Significant Labs: ABG: No results for input(s): "PH", "PCO2", "HCO3", "POCSATURATED", "BE" in the last 48 hours., Blood Culture: No results for input(s): "LABBLOO" in the last 48 hours., BMP:   Recent Labs   Lab 05/13/24  0824 05/14/24  0747   * 98   * 139   K 4.0 4.0    109   CO2 19* 21*   BUN 32* 27*   CREATININE 1.9* 1.4   CALCIUM 9.3 9.0   , CMP   Recent Labs   Lab 05/13/24  0824 " "05/14/24  0747   * 139   K 4.0 4.0    109   CO2 19* 21*   * 98   BUN 32* 27*   CREATININE 1.9* 1.4   CALCIUM 9.3 9.0   ALBUMIN  --  2.8*   ANIONGAP 12 9   , CBC   Recent Labs   Lab 05/13/24  0824   WBC 7.76   HGB 10.1*   HCT 33.2*      , INR No results for input(s): "INR", "PROTIME" in the last 48 hours., Lipid Panel No results for input(s): "CHOL", "HDL", "LDLCALC", "TRIG", "CHOLHDL" in the last 48 hours., and Troponin No results for input(s): "TROPONINI" in the last 48 hours.    Significant Imaging: EKG:    "

## 2024-05-14 NOTE — PROGRESS NOTES
[facility] - Telemetry ([facility])  Cardiology  Progress Note    Patient Name: Paola Wood  MRN: [id number]  Admission Date: 5/8/2024  Hospital Length of Stay: 5 days  Code Status: Prior   Attending Physician: No att. providers found   Primary Care Physician: Grace Duron MD  Expected Discharge Date: 5/14/2024  Principal Problem:Acute chest pain    Subjective:     Hospital Course:   5/10/24-Patient seen and examined today, sitting up in bed. Feeling better. SOB greatly improved. Still with intermittent CP, but also notes improvement. Labs reviewed. Serial troponin negative. Creatinine stable at 1.7. BP soft, meds adjusted. TTE with normal EF.    5/11/24- Patient seen and examined today lying flat in the bed. Pt reports SOB when ambulating. Patient with CP. But does note when sitting up Sob isn't as bad. Still IV diuresising. Labs reviewed. Labs stable. Creatinine has improved from 1.7 to 1.6. Kidney function has improved.      5/12/24- Patient seen and examined today lying in bed. Pt reports that she isn't feeling fine today and that she has a headache. Pt states that she is still SOB. Labs reviewed. Creatinine bumped from 1.6 to 1.9.     5/13/24-Patient seen and examined today, resting in bed. Feeling better. No CP or SOB during exam. Labs reviewed. Creatinine still 1.9. Brilinta switched to Plavix yesterday.     05/14/24 Cr 1.4 improved. SOB better and no chest pain. VSS      ROS  Objective:     Vital Signs (Most Recent):  Temp: 98.5 °F (36.9 °C) (05/14/24 1121)  Pulse: 64 (05/14/24 1121)  Resp: 15 (05/14/24 1121)  BP: 133/63 (05/14/24 1121)  SpO2: 99 % (05/14/24 1121) Vital Signs (24h Range):  Temp:  [98.2 °F (36.8 °C)-99 °F (37.2 °C)] 98.5 °F (36.9 °C)  Pulse:  [60-78] 64  Resp:  [15-18] 15  SpO2:  [98 %-99 %] 99 %  BP: (110-164)/(56-74) 133/63     Weight: 69.8 kg (153 lb 14.1 oz)  Body mass index is 27.26 kg/m².     SpO2: 99 %       No intake or output data in the 24 hours ending 05/14/24  "1853    Lines/Drains/Airways       None                      Physical Exam  Vitals and nursing note reviewed.   Constitutional:       General: She is not in acute distress.     Appearance: Normal appearance. She is well-developed. She is not diaphoretic.   HENT:      Head: Normocephalic and atraumatic.   Eyes:      General:         Right eye: No discharge.         Left eye: No discharge.      Pupils: Pupils are equal, round, and reactive to light.   Cardiovascular:      Rate and Rhythm: Normal rate and regular rhythm.      Heart sounds: Normal heart sounds, S1 normal and S2 normal. No murmur heard.  Pulmonary:      Effort: Pulmonary effort is normal.   Musculoskeletal:      Right lower leg: No edema.      Left lower leg: No edema.   Skin:     General: Skin is warm and dry.      Findings: No erythema.   Neurological:      General: No focal deficit present.      Mental Status: She is alert and oriented to person, place, and time.   Psychiatric:         Behavior: Behavior normal.            Significant Labs: ABG: No results for input(s): "PH", "PCO2", "HCO3", "POCSATURATED", "BE" in the last 48 hours., Blood Culture: No results for input(s): "LABBLOO" in the last 48 hours., BMP:   Recent Labs   Lab 05/13/24  0824 05/14/24  0747   * 98   * 139   K 4.0 4.0    109   CO2 19* 21*   BUN 32* 27*   CREATININE 1.9* 1.4   CALCIUM 9.3 9.0   , CMP   Recent Labs   Lab 05/13/24  0824 05/14/24  0747   * 139   K 4.0 4.0    109   CO2 19* 21*   * 98   BUN 32* 27*   CREATININE 1.9* 1.4   CALCIUM 9.3 9.0   ALBUMIN  --  2.8*   ANIONGAP 12 9   , CBC   Recent Labs   Lab 05/13/24  0824   WBC 7.76   HGB 10.1*   HCT 33.2*      , INR No results for input(s): "INR", "PROTIME" in the last 48 hours., Lipid Panel No results for input(s): "CHOL", "HDL", "LDLCALC", "TRIG", "CHOLHDL" in the last 48 hours., and Troponin No results for input(s): "TROPONINI" in the last 48 hours.    Significant Imaging: EKG:  "   Assessment and Plan:       Acute on chronic diastolic congestive heart failure  -BNP mildly elevated and CXR concerning for pulmonary edema  -Additional dose of IV Lasix today  -Continue BB as tolerated  -Hold Entresto given LINDSAY  -TTE with normal EF    5/10/24  -Clinically improved  -Additional dose of IV Lasix today  -Continue BB    5/13/24  -Stable/improved, diuretics held given bumped creatinine    Acute pulmonary edema  -Additional dose of IV Lasix today, reassess in AM  -TTE with normal EF    5/10/24  -Additional Lasix today, clinically improved    5/13/24  -Stable/improved    Hypertension  -BP labile  -Continue BB as tolerated  -Hold Entresto given bumped creatinine    Hyperlipidemia  -Statin intolerant    Coronary artery disease of bypass graft of native heart with stable angina pectoris  -See plan under CP    05/14/24 improved SOB after switched from brillinta to Plavix  No angina  Continue current meds  F/u as OP at cardiology office      Anemia in chronic illness  -Stable, monitor        VTE Risk Mitigation (From admission, onward)           Ordered     IP VTE HIGH RISK PATIENT  Once         05/08/24 1957                    Rashi Corrales MD  Cardiology  [facility] - Telemetry ([facility])

## 2024-05-22 ENCOUNTER — OFFICE VISIT (OUTPATIENT)
Dept: FAMILY MEDICINE | Facility: CLINIC | Age: 74
End: 2024-05-22
Payer: MEDICARE

## 2024-05-22 VITALS
SYSTOLIC BLOOD PRESSURE: 123 MMHG | HEART RATE: 54 BPM | HEIGHT: 63 IN | RESPIRATION RATE: 18 BRPM | WEIGHT: 153 LBS | BODY MASS INDEX: 27.11 KG/M2 | OXYGEN SATURATION: 95 % | DIASTOLIC BLOOD PRESSURE: 62 MMHG | TEMPERATURE: 98 F

## 2024-05-22 DIAGNOSIS — K21.9 GASTROESOPHAGEAL REFLUX DISEASE, UNSPECIFIED WHETHER ESOPHAGITIS PRESENT: ICD-10-CM

## 2024-05-22 DIAGNOSIS — I25.10 ATHSCL HEART DISEASE OF NATIVE CORONARY ARTERY W/O ANG PCTRS: ICD-10-CM

## 2024-05-22 DIAGNOSIS — Z78.9 STATIN INTOLERANCE: ICD-10-CM

## 2024-05-22 DIAGNOSIS — N18.32 STAGE 3B CHRONIC KIDNEY DISEASE: Primary | ICD-10-CM

## 2024-05-22 DIAGNOSIS — I50.9 CHRONIC CONGESTIVE HEART FAILURE, UNSPECIFIED HEART FAILURE TYPE: ICD-10-CM

## 2024-05-22 DIAGNOSIS — I25.10 ARTERIOSCLEROSIS OF CORONARY ARTERY: ICD-10-CM

## 2024-05-22 DIAGNOSIS — F51.01 PRIMARY INSOMNIA: ICD-10-CM

## 2024-05-22 DIAGNOSIS — N18.31 CHRONIC KIDNEY DISEASE, STAGE 3A: ICD-10-CM

## 2024-05-22 DIAGNOSIS — I20.9 ANGINA PECTORIS, UNSPECIFIED: ICD-10-CM

## 2024-05-22 PROBLEM — R29.6 REPEATED FALLS: Status: ACTIVE | Noted: 2023-10-25

## 2024-05-22 PROBLEM — Z91.81 HISTORY OF FALLING: Status: ACTIVE | Noted: 2023-10-30

## 2024-05-22 PROBLEM — I11.0 HYPERTENSIVE HEART DISEASE WITH HEART FAILURE: Status: ACTIVE | Noted: 2023-10-30

## 2024-05-22 PROBLEM — Z55.6 PROBLEMS RELATED TO HEALTH LITERACY: Status: ACTIVE | Noted: 2023-10-30

## 2024-05-22 PROBLEM — J45.909 UNSPECIFIED ASTHMA, UNCOMPLICATED: Status: ACTIVE | Noted: 2023-10-25

## 2024-05-22 PROBLEM — Z79.82 LONG TERM (CURRENT) USE OF ASPIRIN: Status: ACTIVE | Noted: 2023-10-30

## 2024-05-22 PROCEDURE — 99999 PR PBB SHADOW E&M-EST. PATIENT-LVL V: CPT | Mod: PBBFAC,,, | Performed by: STUDENT IN AN ORGANIZED HEALTH CARE EDUCATION/TRAINING PROGRAM

## 2024-05-22 PROCEDURE — 99214 OFFICE O/P EST MOD 30 MIN: CPT | Mod: S$PBB,,, | Performed by: STUDENT IN AN ORGANIZED HEALTH CARE EDUCATION/TRAINING PROGRAM

## 2024-05-22 PROCEDURE — 99215 OFFICE O/P EST HI 40 MIN: CPT | Mod: PBBFAC,PO | Performed by: STUDENT IN AN ORGANIZED HEALTH CARE EDUCATION/TRAINING PROGRAM

## 2024-05-22 RX ORDER — FUROSEMIDE 20 MG/1
20 TABLET ORAL DAILY PRN
Qty: 90 TABLET | Refills: 3 | Status: SHIPPED | OUTPATIENT
Start: 2024-05-22

## 2024-05-22 RX ORDER — TRAZODONE HYDROCHLORIDE 50 MG/1
50 TABLET ORAL NIGHTLY
Qty: 30 TABLET | Refills: 11 | Status: SHIPPED | OUTPATIENT
Start: 2024-05-22 | End: 2025-05-22

## 2024-05-22 RX ORDER — METOPROLOL SUCCINATE 50 MG/1
25 TABLET, EXTENDED RELEASE ORAL 2 TIMES DAILY
Qty: 90 TABLET | Refills: 3 | Status: SHIPPED | OUTPATIENT
Start: 2024-05-22 | End: 2024-08-20

## 2024-05-22 RX ORDER — POTASSIUM CHLORIDE 20 MEQ/1
20 TABLET, EXTENDED RELEASE ORAL DAILY
Qty: 90 TABLET | Refills: 3 | Status: SHIPPED | OUTPATIENT
Start: 2024-05-22

## 2024-05-22 RX ORDER — NITROGLYCERIN 0.4 MG/1
0.4 TABLET SUBLINGUAL EVERY 5 MIN PRN
Qty: 25 TABLET | Refills: 0 | Status: SHIPPED | OUTPATIENT
Start: 2024-05-22

## 2024-05-22 RX ORDER — RANOLAZINE 500 MG/1
500 TABLET, EXTENDED RELEASE ORAL 2 TIMES DAILY
Qty: 90 TABLET | Refills: 3 | Status: SHIPPED | OUTPATIENT
Start: 2024-05-22

## 2024-05-22 RX ORDER — GEMFIBROZIL 600 MG/1
600 TABLET, FILM COATED ORAL
Qty: 60 TABLET | Refills: 3 | Status: SHIPPED | OUTPATIENT
Start: 2024-05-22 | End: 2024-06-04

## 2024-05-22 RX ORDER — PANTOPRAZOLE SODIUM 40 MG/1
40 TABLET, DELAYED RELEASE ORAL DAILY
Qty: 90 TABLET | Refills: 3 | Status: SHIPPED | OUTPATIENT
Start: 2024-05-22

## 2024-05-22 RX ORDER — CLOPIDOGREL BISULFATE 75 MG/1
75 TABLET ORAL DAILY
Qty: 90 TABLET | Refills: 3 | Status: SHIPPED | OUTPATIENT
Start: 2024-05-22

## 2024-05-22 RX ORDER — OXYCODONE AND ACETAMINOPHEN 10; 325 MG/1; MG/1
1 TABLET ORAL EVERY 4 HOURS PRN
Start: 2024-05-22

## 2024-05-22 NOTE — PATIENT INSTRUCTIONS
To wean off amitriptyline: skip a dose every other day for few days, then skip 2 days for a few days, then skip 3-4 days for a few days. The total wean should take about 2-3 weeks. You may experience headache, dizziness, increased anxiety. Be sure to drink plenty of water.

## 2024-05-22 NOTE — PROGRESS NOTES
Problem List Items Addressed This Visit          Cardiac/Vascular    [facility] (congestive cardiac failure)    Relevant Medications    metoprolol succinate (TOPROL-XL) 50 MG 24 hr tablet    potassium chloride SA (K-DUR,KLOR-CON) 20 MEQ tablet    ranolazine (RANEXA) 500 MG Tb12    Other Relevant Orders    Ambulatory referral/consult to [facility] Heart Failure [facility]    Athscl heart disease of native coronary artery w/o ang pctrs    Overview     Chronic history; stable  Continue Plavix  Referral to heart failure clinic  GDMT: bb, ENTRESTO, IMDUR         Relevant Medications    clopidogreL (PLAVIX) 75 mg tablet    Other Relevant Orders    Ambulatory referral/consult to [facility] Heart Failure Clinic       Renal/    Stage 3b chronic kidney disease - Primary    Overview       Lab Results   Component Value Date     05/14/2024    K 4.0 05/14/2024     05/14/2024    CO2 21 (L) 05/14/2024    BUN 27 (H) 05/14/2024    CREATININE 1.4 05/14/2024    CALCIUM 9.0 05/14/2024    ANIONGAP 9 05/14/2024    EGFRNORACEVR 40 (A) 05/14/2024     Chronic; stable   Avoid nephrotoxic agents            Relevant Medications    furosemide (LASIX) 20 MG tablet    RESOLVED: Chronic kidney disease, stage 3a    Overview       Lab Results   Component Value Date     05/14/2024    K 4.0 05/14/2024     05/14/2024    CO2 21 (L) 05/14/2024    BUN 27 (H) 05/14/2024    CREATININE 1.4 05/14/2024    CALCIUM 9.0 05/14/2024    ANIONGAP 9 05/14/2024    EGFRNORACEVR 40 (A) 05/14/2024     Chronic; stable   Avoid nephrotoxic agents            Relevant Medications    furosemide (LASIX) 20 MG tablet       GI    Acid reflux    Overview     -symptoms controlled with daily PPI  -denies alarm symptoms, such as dysphagia, weight loss or N/V  -continue lifestyle modification with avoidance of acidic foods, caffeine, late night eating           Relevant Medications    pantoprazole (PROTONIX) 40 MG tablet       Other    Statin  intolerance    Overview     -chronic condition. Statin intolerance. Sent praluent  Referral to cards to follow up      Hyperlipidemia Medications               alirocumab (PRALUENT PEN) 75 mg/mL PnIj Inject 1 mL (75 mg total) into the skin every 14 (fourteen) days.    gemfibroziL (LOPID) 600 MG tablet Take 600 mg by mouth 2 (two) times daily.              Lab Results   Component Value Date    CHOL 363 (H) 04/15/2024    CHOL 381 (H) 08/27/2023    CHOL 251 (H) 01/01/2023     Lab Results   Component Value Date    HDL 46 04/15/2024    HDL 53 08/27/2023    HDL 48 01/01/2023     Lab Results   Component Value Date    LDLCALC 286 (H) 04/15/2024    LDLCALC 290 (H) 08/27/2023    LDLCALC 183 (H) 01/01/2023     Lab Results   Component Value Date    TRIG 154 04/15/2024    TRIG 191 08/27/2023    TRIG 99 01/01/2023     Lab Results   Component Value Date    CHOLHDL 7.9 (H) 04/15/2024    CHOLHDL 7.2 (H) 08/27/2023    CHOLHDL 5.2 (H) 01/01/2023          The ASCVD Risk score (Daniel JUNE, et al., 2019) failed to calculate for the following reasons:    The valid total cholesterol range is 130 to 320 mg/dL            Other Visit Diagnoses       Arteriosclerosis of coronary artery        Relevant Orders    Ambulatory referral/consult to [facility] Cong[facility] Heart Failure [facility]    Angina pectoris, unspecified        Relevant Medications    nitroGLYCERIN (NITROSTAT) 0.4 MG SL tablet    Primary insomnia        Relevant Medications    traZODone (DESYREL) 50 MG tablet                Patient ID: Paola Wood is a 73 y.o. female.    Chief Complaint:  hospital f/u     Has a hx of  has a past medical history of Allergy, AR (allergic rhinitis), Asthma, CAD (coronary artery disease), CHF (congestive heart failure), Chronic pain, GERD (gastroesophageal reflux disease), Hypertension, Seizures, SOB (shortness of breath), and TIA (transient ischemic attack).   Patient admitted early May for chest pain and shortness of breath found to be in heart  "failure.  She is accompanied by her daughter today    Per chart review:  "HPI:   Patient is a 73-year-old female with past medical history significant for CAD status post CABG about 2 years ago and multiple coronary stents in the past -- most recently at [facility] on 4/16/2024 with stents placed to LAD, left circumflex, and left main, diastolic congestive heart failure, hypertension, asthma, allergic rhinitis, chronic back pain, chronic dyspnea, TIA, GERD,  hyperlipidemia with statin intolerance, and chronic anemia who presented to ED with complaint of severe chest pain, rated 10/10.  She reports that she started feeling short of breath about 3 days ago and then started with upper back pain yesterday which radiate to through to her upper left chest and goes up into her left neck.  She states that the pain was intermittent, however now has become constant.   Initial vital signs in ED blood pressure 98/50, heart rate 69, afebrile, saturating 100% on room air.   Blood pressure did improve to the 130s systolic.   EKG showed normal sinus rhythm with sinus arrhythmia and nonspecific T-wave abnormality with T-wave inversion evident in lateral leads, no STEMI.  Chest x-ray showed mild perihilar interstitial hazy opacity suggestive of pulmonary edema.  Lab workup significant for hemoglobin 9.6, hematocrit 29.9, CO2 21, BUN 31, creatinine 1.7, , troponin 0.011.  ED nurses present in the room giving her morphine and Zofran at this time for her complaint of chest pain rated 9.5/10 currently. she was also given IV Lasix 60 mg while in ED. hospital medicine is consulted for admission due to chest pain.     * No surgery found *       Hospital Course:   74 y/o female presents to ED with complaint of severe chest pain, rated 10/10.   EKG showed normal sinus rhythm with sinus arrhythmia and nonspecific T-wave abnormality with T-wave inversion evident in lateral leads, no STEMI.   Chest x-ray: showed mild perihilar " interstitial hazy opacity suggestive of pulmonary edema.   Serial troponin negative   Cardiology consulted  ECHO: EF 60%, Indeterminate diastolic function and normal systolic function. PA pressure 21mmHg. Mild aortic valve sclerosis.   BP labile will continue BB as tolerated but hold Entresto given bumped creatinine. Addition dose of IV lasix 40mg given today and evaluate response in a.m. as patient continues to report severe CP and SOB.   Additional dose of IV lasix 40mg dose ordered today 5/10, Cardiology recommends addition of  low dose nitropatch IF BP permits. Will continue to assess response to medication adjustments per cardiology recommendations. Patient reports improvement in Chest pain states it is 9/10 now, will continue PRN pain medications as BP allows.   Cardiology ordered BID Lasix 40mg IV BID as the patient is continuing to report SOB.   Patient reported one episode of severe chest pain 5/11, PRN morphine given, STAT EKG and troponin ordered. Cardiology Dr. Carr aware and reviewed troponin and EKG. Patient reported improvement with morphine.      Patient BUN 31 & Crit 1.9 elevated from previous, held IV lasix. Consulted cardiology, Dr. Carr recommends 250ml NS bolus and will switch the brilinta to plavix as patient continues to report SOB. Will continue to trend BUN & Crit with morning labs.   5/13 creatinine remains 1.9. physical/occupational therapy consulted. Anticipate discharge tomorrow.      5/14  Creatinine improved to 1.4, close to baseline. Denies chest pain or dyspnea. Homehealth physical/occupational therapy ordered on discharge to monitor blood pressure and creatinine due to holding blood pressure medication(s) on discharge.      No acute distress. No respiratory distress. Soft abdomen on palpation. Clear lungs on auscultation. AO3. Mood and affect within normal limits.      Patient seen and evaluated by me. Patient was determined to be suitable for discharge. Patient deemed stable  "for discharge to home with homehealth physical/occupational therapy and nurse practitioner to visit home program."    Since discharge, patient has been feeling well.  She would like to start heart failure clinic therapy.  She has been taking all meds as prescribed denies chest pain.    Otherwise, patient has been feeling well. No additional concerns. Denies nausea, vomiting, fevers, chills, abdominal pain.      Health Maintenance Topics with due status: Not Due       Topic Last Completion Date    Colorectal Cancer Screening 04/13/2015    Mammogram 11/14/2023    DEXA Scan 11/14/2023    Lipid Panel 04/15/2024        ==============================================  History reviewed.   Health Maintenance Due   Topic Date Due    Hepatitis C Screening  Never done    Annual UACr  Never done    Pneumococcal Vaccines (Age 65+) (1 of 2 - PCV) Never done    TETANUS VACCINE  Never done    High Dose Statin  Never done    RSV Vaccine (Age 60+ and Pregnant patients) (1 - 1-dose 60+ series) Never done    COVID-19 Vaccine (7 - 2023-24 season) 09/01/2023       Past Medical History:  Past Medical History:   Diagnosis Date    Allergy     AR (allergic rhinitis)     Asthma     CAD (coronary artery disease)     CHF (congestive heart failure)     Chronic pain     neck/back/arm/knee    GERD (gastroesophageal reflux disease)     Hypertension     Seizures     SOB (shortness of breath)     TIA (transient ischemic attack)      Past Surgical History:   Procedure Laterality Date    CATARACT EXTRACTION      CORONARY ANGIOPLASTY WITH STENT PLACEMENT      CORONARY ARTERY BYPASS GRAFT      HYSTERECTOMY  1983    partial (Ovaries removed 1998)    OOPHORECTOMY  1998     Review of patient's allergies indicates:   Allergen Reactions    Ace inhibitors Swelling and Itching     Other reaction(s): tongue swelling    Other reaction(s): Other (See Comments)    Mild itching    Lisinopril      Other reaction(s): Not available    Neomycin     Niacin     Polymyxin b "     Atorvastatin Rash and Other (See Comments)     Myalgia    Bacitracin Itching     Mild itching    Ezetimibe Other (See Comments)     Myalgia    Rosuvastatin Nausea And Vomiting and Itching     Myalgia    Simvastatin Itching     Mild itching    Statins-hmg-coa reductase inhibitors Nausea And Vomiting     Other reaction(s): Not available     Current Outpatient Medications on File Prior to Visit   Medication Sig Dispense Refill    aspirin (ECOTRIN) 81 MG EC tablet Take 81 mg by mouth once daily.      cloNIDine (CATAPRES) 0.2 MG tablet Take 1 tablet (0.2 mg total) by mouth as needed. 30 tablet 0    isosorbide mononitrate (IMDUR) 30 MG 24 hr tablet Take 30 mg by mouth every morning.      sacubitriL-valsartan (ENTRESTO) 24-26 mg per tablet Take 1 tablet by mouth 2 (two) times daily.      empagliflozin (JARDIANCE) 10 mg tablet Take 10 mg by mouth once daily.       No current facility-administered medications on file prior to visit.     Social History     Socioeconomic History    Marital status:    Tobacco Use    Smoking status: Never    Smokeless tobacco: Never   Substance and Sexual Activity    Alcohol use: No    Drug use: No    Sexual activity: Not Currently     Social Determinants of Health     Food Insecurity: No Food Insecurity (5/9/2024)    Hunger Vital Sign     Worried About Running Out of Food in the Last Year: Never true     Ran Out of Food in the Last Year: Never true   Transportation Needs: No Transportation Needs (5/9/2024)    TRANSPORTATION NEEDS     Transportation : No   Housing Stability: Low Risk  (5/9/2024)    Housing Stability Vital Sign     Unable to Pay for Housing in the Last Year: No     Homeless in the Last Year: No     Family History   Problem Relation Name Age of Onset    Heart disease Mother      Heart disease Father      No Known Problems Maternal Grandmother      No Known Problems Maternal Grandfather      No Known Problems Paternal Grandmother      No Known Problems Paternal  Grandfather      Breast cancer Sister  53          Review of Systems   12 point review of systems per hpi, otherwise negative         Objective:    Nursing note and vitals reviewed.  Vitals:    05/22/24 1432   BP: 123/62   Pulse: (!) 54   Resp: 18   Temp: 97.5 °F (36.4 °C)     Body mass index is 27.1 kg/m².     Physical Exam   Constitutional: oriented to person, place, and time. well-developed and well-nourished. No distress.   HENT: WNL  Head: Normocephalic and atraumatic.   Eyes: EOM are normal.   Neck: Normal range of motion. Neck supple.   Cardiovascular:  Slightly bradycardic heart rate 54, asymptomatic  Pulmonary/Chest: Effort normal. No respiratory distress.   GI: soft, non distended, no ttp, no rebound/guarding  Musculoskeletal: Normal range of motion. no edema. Has walker  Neurological: CN II-XII intact  Skin: warm and dry.   Psychiatric: normal mood and affect. behavior is normal.           Grace Duron MD    We Offer Telehealth & Same Day Appointments!   Book your Telehealth appointment with me through my nurse or   Clinic appointments on [facility]!  [phone]     To Schedule appointments online, go to [facility]: [email or web address]     Transitional Care Note    Family and/or Caretaker present at visit?  Yes.  Diagnostic tests reviewed/disposition: No diagnosic tests pending after this hospitalization.  Disease/illness education:  Heart failure  Home health/community services discussion/referrals: Patient has home health established at [place] [facility] .   Establishment or re-establishment of referral orders for community resources:  Heart failure clinic   Discussion with other health care providers: No discussion with other health care providers necessary.

## 2024-06-04 ENCOUNTER — TELEPHONE (OUTPATIENT)
Dept: CARDIOLOGY | Facility: CLINIC | Age: 74
End: 2024-06-04

## 2024-06-04 ENCOUNTER — OFFICE VISIT (OUTPATIENT)
Dept: CARDIOLOGY | Facility: CLINIC | Age: 74
End: 2024-06-04
Payer: MEDICARE

## 2024-06-04 VITALS
OXYGEN SATURATION: 99 % | HEART RATE: 82 BPM | HEIGHT: 63 IN | WEIGHT: 149 LBS | BODY MASS INDEX: 26.4 KG/M2 | SYSTOLIC BLOOD PRESSURE: 126 MMHG | DIASTOLIC BLOOD PRESSURE: 62 MMHG

## 2024-06-04 DIAGNOSIS — Z98.61 S/P PTCA (PERCUTANEOUS TRANSLUMINAL CORONARY ANGIOPLASTY): ICD-10-CM

## 2024-06-04 DIAGNOSIS — I25.10 ATHSCL HEART DISEASE OF NATIVE CORONARY ARTERY W/O ANG PCTRS: Primary | ICD-10-CM

## 2024-06-04 DIAGNOSIS — E78.5 HYPERLIPIDEMIA, UNSPECIFIED HYPERLIPIDEMIA TYPE: ICD-10-CM

## 2024-06-04 DIAGNOSIS — Z95.1 S/P CABG (CORONARY ARTERY BYPASS GRAFT): ICD-10-CM

## 2024-06-04 DIAGNOSIS — I10 PRIMARY HYPERTENSION: ICD-10-CM

## 2024-06-04 DIAGNOSIS — I25.708 CORONARY ARTERY DISEASE OF BYPASS GRAFT OF NATIVE HEART WITH STABLE ANGINA PECTORIS: ICD-10-CM

## 2024-06-04 DIAGNOSIS — Z78.9 STATIN INTOLERANCE: ICD-10-CM

## 2024-06-04 PROBLEM — I50.9 HEART FAILURE, UNSPECIFIED: Status: RESOLVED | Noted: 2023-10-30 | Resolved: 2024-06-04

## 2024-06-04 PROCEDURE — 99213 OFFICE O/P EST LOW 20 MIN: CPT | Mod: PBBFAC,PO | Performed by: INTERNAL MEDICINE

## 2024-06-04 PROCEDURE — 99999 PR PBB SHADOW E&M-EST. PATIENT-LVL III: CPT | Mod: PBBFAC,,, | Performed by: INTERNAL MEDICINE

## 2024-06-04 PROCEDURE — 99215 OFFICE O/P EST HI 40 MIN: CPT | Mod: S$PBB,,, | Performed by: INTERNAL MEDICINE

## 2024-06-04 RX ORDER — AMLODIPINE BESYLATE 5 MG/1
5 TABLET ORAL
COMMUNITY
Start: 2024-05-27

## 2024-06-04 NOTE — PROGRESS NOTES
Subjective   Patient ID:  Paola Wood is a 73 y.o. female who presents for follow-up of Follow-up      HPI73 yo WF with extensive cardiac hx recently in [place] and underwent procedures as described below. Has severe HLD but has been intolerant of statins.She currently states she has SOB but no CP. We reviewed her meds and she was not aware of Rx for praluent.     ASSESSMENT AND PLAN: 4-       1. Angiographically severe coronary artery disease with heavy   calcification with occluded graft to the left coronaries and atretic left   internal mammary artery.      2. Patent graft to the RCA PDA with patent stent in the mid graft.      3. Normal left ventricular end-diastolic pressure.      4. Normal ejection fraction.      5. Successful high risk percutaneous coronary intervention with   reconstruction of the left system with multiple stents to left anterior   descending and left circumflex using mini-crush technique in OM1 and OM2   bifurcation and in the left main left   anterior descending, left circumflex bifurcation with good angiographic result     Past procedures  1. CAD s/p PTCA X 2 mRCA 8/10, 9/10; s/p CABG X 4v (LIMA-LAD-atretic, SVG-OM1/OM2, SVG-RCA) 2011; s/pPCI mLAD 3.0 X 12 mm Promus 4/13; s/p PCI SVG-PDA 3.5 X 15 mm Ab 1/3/2023        Review of Systems   Constitutional: Negative for decreased appetite, fever, malaise/fatigue, weight gain and weight loss.   HENT:  Negative for hearing loss and nosebleeds.    Eyes:  Negative for visual disturbance.   Cardiovascular:  Positive for dyspnea on exertion. Negative for chest pain, claudication, cyanosis, irregular heartbeat, leg swelling, near-syncope, orthopnea, palpitations, paroxysmal nocturnal dyspnea and syncope.   Respiratory:  Positive for shortness of breath. Negative for cough, hemoptysis, sleep disturbances due to breathing, snoring and wheezing.    Endocrine: Negative for cold intolerance, heat intolerance, polydipsia and  "polyuria.   Hematologic/Lymphatic: Negative for adenopathy and bleeding problem. Does not bruise/bleed easily.   Skin:  Negative for color change, itching, poor wound healing, rash and suspicious lesions.   Musculoskeletal:  Negative for arthritis, back pain, falls, joint pain, joint swelling, muscle cramps, muscle weakness and myalgias.   Gastrointestinal:  Negative for bloating, abdominal pain, change in bowel habit, constipation, flatus, heartburn, hematemesis, hematochezia, hemorrhoids, jaundice, melena, nausea and vomiting.   Genitourinary:  Negative for bladder incontinence, decreased libido, frequency, hematuria, hesitancy and urgency.   Neurological:  Negative for brief paralysis, difficulty with concentration, excessive daytime sleepiness, dizziness, focal weakness, headaches, light-headedness, loss of balance, numbness, vertigo and weakness.   Psychiatric/Behavioral:  Positive for memory loss. Negative for altered mental status and depression. The patient does not have insomnia and is not nervous/anxious.    Allergic/Immunologic: Negative for environmental allergies, hives and persistent infections.          Objective     Physical Exam  Vitals and nursing note reviewed.   Constitutional:       Appearance: She is well-developed.      Comments: /62   Pulse 82   Ht 5' 3" (1.6 m)   Wt 67.6 kg (149 lb)   SpO2 99%   BMI 26.39 kg/m²      HENT:      Head: Normocephalic and atraumatic.      Right Ear: External ear normal.      Left Ear: External ear normal.      Nose: Nose normal.   Eyes:      General: Lids are normal. No scleral icterus.        Right eye: No discharge.         Left eye: No discharge.      Conjunctiva/sclera: Conjunctivae normal.      Right eye: No hemorrhage.     Pupils: Pupils are equal, round, and reactive to light.   Neck:      Thyroid: No thyromegaly.      Vascular: No JVD.      Trachea: No tracheal deviation.   Cardiovascular:      Rate and Rhythm: Normal rate and regular rhythm. "      Pulses: Intact distal pulses.      Heart sounds: Murmur heard.      Early systolic murmur is present with a grade of 2/6.      No friction rub. No gallop.   Pulmonary:      Effort: Pulmonary effort is normal. No respiratory distress.      Breath sounds: Normal breath sounds. No wheezing or rales.   Chest:      Chest wall: No tenderness.   Breasts:     Breasts are symmetrical.   Abdominal:      General: Bowel sounds are normal. There is no distension.      Palpations: Abdomen is soft. There is no hepatomegaly or mass.      Tenderness: There is no abdominal tenderness. There is no guarding or rebound.   Musculoskeletal:         General: No tenderness. Normal range of motion.      Cervical back: Normal range of motion and neck supple.   Lymphadenopathy:      Cervical: No cervical adenopathy.   Skin:     General: Skin is warm and dry.      Coloration: Skin is not pale.      Findings: No erythema or rash.   Neurological:      Mental Status: She is alert and oriented to person, place, and time.      Cranial Nerves: No cranial nerve deficit.      Coordination: Coordination normal.      Deep Tendon Reflexes: Reflexes are normal and symmetric. Reflexes normal.   Psychiatric:         Behavior: Behavior normal.         Thought Content: Thought content normal.         Judgment: Judgment normal.            Assessment and Plan     1. Athscl heart disease of native coronary artery w/o ang pctrs    2. Coronary artery disease of bypass graft of native heart with stable angina pectoris    3. Hyperlipidemia, unspecified hyperlipidemia type    4. Primary hypertension    5. S/P CABG (coronary artery bypass graft)    6. S/P PTCA (percutaneous transluminal coronary angioplasty)    7. Statin intolerance        Plan:  She is going to bring her meds to the office   Dc the praluent and repatha sent to [facility].    Will review all meds when she brings them in  Orders Placed This Encounter   Procedures    Lipid Panel     Echo     Follow up in about 6 weeks (around 7/16/2024).        Advance Care Planning     Date: 06/04/2024

## 2024-06-04 NOTE — TELEPHONE ENCOUNTER
Attempted without success to contact patient to discuss this. Left voicemail for patient to call back.

## 2024-06-18 PROBLEM — N18.31 CHRONIC KIDNEY DISEASE, STAGE 3A: Status: RESOLVED | Noted: 2024-05-22 | Resolved: 2024-06-18

## 2024-06-25 ENCOUNTER — TELEPHONE (OUTPATIENT)
Dept: FAMILY MEDICINE | Facility: CLINIC | Age: 74
End: 2024-06-25
Payer: MEDICARE

## 2024-06-25 DIAGNOSIS — F51.04 CHRONIC INSOMNIA: Primary | ICD-10-CM

## 2024-06-25 NOTE — TELEPHONE ENCOUNTER
----- Message from Tonny Wiseman sent at 6/25/2024  3:40 PM CDT -----  Contact: Paola Olmedo is calling in regard to speaking with the nurse to discuss other options of medication  to help her sleep at night.  Please call back at [phone]        Thanks

## 2024-06-25 NOTE — TELEPHONE ENCOUNTER
Pt states since switching from the amitriptyline to the Trazodone she is only sleeping a few hours each night and it causes her headaches. Pt requesting alternative, please advise. Last OV 5/2024.

## 2024-06-26 RX ORDER — MIRTAZAPINE 7.5 MG/1
7.5 TABLET, FILM COATED ORAL NIGHTLY
Qty: 30 TABLET | Refills: 11 | Status: SHIPPED | OUTPATIENT
Start: 2024-06-26 | End: 2025-06-26

## 2024-06-26 NOTE — TELEPHONE ENCOUNTER
Discontinue trazodone.  Start mirtazapine 7.5 mg nightly.  Okay to increase to 15 mg nightly in 1 week if no side effects.    Medications Ordered This Encounter   Medications    mirtazapine (REMERON) 7.5 MG Tab     Sig: Take 1 tablet (7.5 mg total) by mouth every evening.     Dispense:  30 tablet     Refill:  11

## 2024-06-27 ENCOUNTER — TELEPHONE (OUTPATIENT)
Dept: CARDIOLOGY | Facility: HOSPITAL | Age: 74
End: 2024-06-27
Payer: MEDICARE

## 2024-07-08 ENCOUNTER — OFFICE VISIT (OUTPATIENT)
Dept: FAMILY MEDICINE | Facility: CLINIC | Age: 74
End: 2024-07-08
Payer: MEDICARE

## 2024-07-08 VITALS
TEMPERATURE: 96 F | DIASTOLIC BLOOD PRESSURE: 70 MMHG | HEART RATE: 53 BPM | WEIGHT: 148.69 LBS | SYSTOLIC BLOOD PRESSURE: 139 MMHG | OXYGEN SATURATION: 98 % | BODY MASS INDEX: 26.34 KG/M2 | HEIGHT: 63 IN

## 2024-07-08 DIAGNOSIS — G47.00 INSOMNIA, UNSPECIFIED TYPE: Primary | ICD-10-CM

## 2024-07-08 PROCEDURE — 99215 OFFICE O/P EST HI 40 MIN: CPT | Mod: PBBFAC,PO | Performed by: NURSE PRACTITIONER

## 2024-07-08 PROCEDURE — 99213 OFFICE O/P EST LOW 20 MIN: CPT | Mod: S$PBB,,, | Performed by: NURSE PRACTITIONER

## 2024-07-08 PROCEDURE — 99999 PR PBB SHADOW E&M-EST. PATIENT-LVL V: CPT | Mod: PBBFAC,,, | Performed by: NURSE PRACTITIONER

## 2024-07-08 RX ORDER — LOSARTAN POTASSIUM 50 MG/1
50 TABLET ORAL
COMMUNITY
Start: 2024-06-24

## 2024-07-08 RX ORDER — HYDROXYZINE PAMOATE 50 MG/1
50 CAPSULE ORAL NIGHTLY PRN
Qty: 30 CAPSULE | Refills: 0 | Status: SHIPPED | OUTPATIENT
Start: 2024-07-08

## 2024-07-08 NOTE — PATIENT INSTRUCTIONS
"Petra f/u with Dr. Royal Chauhan well  Rest  Report to ER immediately if symptoms worsen or persist    Hydroxyzine: Patient drug information  2024© UpToDate, Inc. and its affiliates and/or licensors. All Rights Reserved.  Access [employer] for additional drug information, tools, and databases.  Contributor Disclosures  For additional information see "Hydroxyzine: Drug information" and "Hydroxyzine: Pediatric drug information"    You must carefully read the "Consumer Information Use and Disclaimer" below in order to understand and correctly use this information.  Brand Names: US  Vistaril    Brand Names: Teo  Atarax;     BALJIT-Hydroxyzin;     PMS-HydrOXYzine HCl [DSC]    What is this drug used for?  It is used to treat itching.  It is used to treat anxiety.  It is used to put you to sleep for surgery.  It is used to treat mood problems.  It is used to treat upset stomach and throwing up.  It may be given to you for other reasons. Talk with the doctor.    What do I need to tell my doctor BEFORE I take this drug?  If you are allergic to this drug; any part of this drug; or any other drugs, foods, or substances. Tell your doctor about the allergy and what signs you had.  If you have ever had a long QT on ECG.  If you are in early pregnancy. Do not take this drug during early pregnancy.  If you are breast-feeding. Do not breast-feed while you take this drug.  This is not a list of all drugs or health problems that interact with this drug.  Tell your doctor and pharmacist about all of your drugs (prescription or OTC, natural products, vitamins) and health problems. You must check to make sure that it is safe for you to take this drug with all of your drugs and health problems. Do not start, stop, or change the dose of any drug without checking with your doctor.    What are some things I need to know or do while I take this drug?  All products:  Tell all of your health care providers that you take this drug. This " includes your doctors, nurses, pharmacists, and dentists.  Avoid driving and doing other tasks or actions that call for you to be alert until you see how this drug affects you.  Talk with your doctor before you use alcohol, marijuana or other forms of cannabis, or prescription or OTC drugs that may slow your actions.  An unsafe heartbeat that is not normal (long QT on ECG) has happened with this drug. This may raise the chance of sudden death. Talk with the doctor.  If you are 65 or older, use this drug with care. You could have more side effects.  Tell your doctor if you are pregnant or plan on getting pregnant. You will need to talk about the benefits and risks of using this drug while you are pregnant.  Injection:  Unsafe heart problems and sometimes death have rarely happened when this drug was given with alcohol or certain other drugs that may slow your actions. Talk with your doctor.    What are some side effects that I need to call my doctor about right away?  WARNING/CAUTION: Even though it may be rare, some people may have very bad and sometimes deadly side effects when taking a drug. Tell your doctor or get medical help right away if you have any of the following signs or symptoms that may be related to a very bad side effect:  All products:  Signs of an allergic reaction, like rash; hives; itching; red, swollen, blistered, or peeling skin with or without fever; wheezing; tightness in the chest or throat; trouble breathing, swallowing, or talking; unusual hoarseness; or swelling of the mouth, face, lips, tongue, or throat.  Fast or abnormal heartbeat.  Very bad dizziness or passing out.  Trouble controlling body movements.  Feeling confused.  Rarely, a very bad skin reaction has happened with this drug. Signs include fever and many small skin spots within large areas of redness and swelling. Call your doctor right away if you have a rash or any of these signs.  Injection:  Tissue damage has happened with  this drug. Sometimes, this has led to surgery. Tell your nurse if you have any burning, color changes, pain, skin breakdown, or swelling where the shot was given.    What are some other side effects of this drug?  All drugs may cause side effects. However, many people have no side effects or only have minor side effects. Call your doctor or get medical help if any of these side effects or any other side effects bother you or do not go away:  Dry mouth.  Feeling sleepy.  These are not all of the side effects that may occur. If you have questions about side effects, call your doctor. Call your doctor for medical advice about side effects.  You may report side effects to your national health agency.    How is this drug best taken?  Use this drug as ordered by your doctor. Read all information given to you. Follow all instructions closely.  All oral products:  Take with or without food. Take with food if it causes an upset stomach.  Liquid:  Measure liquid doses carefully. Use the measuring device that comes with this drug. If there is none, ask the pharmacist for a device to measure this drug.  Injection:  It is given as a shot into a muscle.    What do I do if I miss a dose?  All oral products:  If you take this drug on a regular basis, take a missed dose as soon as you think about it.  If it is close to the time for your next dose, skip the missed dose and go back to your normal time.  Do not take 2 doses at the same time or extra doses.  Many times this drug is taken on an as needed basis. Do not take more often than told by the doctor.  Injection:  Call your doctor to find out what to do.    How do I store and/or throw out this drug?  All oral products:  Store at room temperature protected from light. Store in a dry place. Do not store in a bathroom.  Injection:  If you need to store this drug at home, talk with your doctor, nurse, or pharmacist about how to store it.  All products:  Keep all drugs in a safe place.  Keep all drugs out of the reach of children and pets.  Throw away unused or  drugs. Do not flush down a toilet or pour down a drain unless you are told to do so. Check with your pharmacist if you have questions about the best way to throw out drugs. There may be drug take-back programs in your area.    General drug facts  If your symptoms or health problems do not get better or if they become worse, call your doctor.  Do not share your drugs with others and do not take anyone else's drugs.  Some drugs may have another patient information leaflet. If you have any questions about this drug, please talk with your doctor, nurse, pharmacist, or other health care provider.  If you think there has been an overdose, call your poison control center or get medical care right away. Be ready to tell or show what was taken, how much, and when it happened.      Pasquale Guevara,     If you are due for any health screening(s) below please notify me so we can arrange them to be ordered and scheduled. Most healthy patients at your age complete them, but you are free to accept or refuse.     If you can't do it, I'll definitely understand. If you can, I'd certainly appreciate it!    All of your core healthy metrics are met.

## 2024-07-09 ENCOUNTER — OFFICE VISIT (OUTPATIENT)
Dept: CARDIOLOGY | Facility: CLINIC | Age: 74
End: 2024-07-09
Payer: MEDICARE

## 2024-07-09 VITALS
WEIGHT: 153.88 LBS | SYSTOLIC BLOOD PRESSURE: 132 MMHG | HEART RATE: 52 BPM | DIASTOLIC BLOOD PRESSURE: 62 MMHG | BODY MASS INDEX: 26.27 KG/M2 | HEIGHT: 64 IN

## 2024-07-09 DIAGNOSIS — I25.708 CORONARY ARTERY DISEASE OF BYPASS GRAFT OF NATIVE HEART WITH STABLE ANGINA PECTORIS: Primary | ICD-10-CM

## 2024-07-09 DIAGNOSIS — N18.32 STAGE 3B CHRONIC KIDNEY DISEASE: ICD-10-CM

## 2024-07-09 DIAGNOSIS — Z95.5 STENTED CORONARY ARTERY: ICD-10-CM

## 2024-07-09 DIAGNOSIS — I25.10 ATHSCL HEART DISEASE OF NATIVE CORONARY ARTERY W/O ANG PCTRS: ICD-10-CM

## 2024-07-09 DIAGNOSIS — I10 PRIMARY HYPERTENSION: Chronic | ICD-10-CM

## 2024-07-09 DIAGNOSIS — R09.89 ARTERIAL BRUIT: ICD-10-CM

## 2024-07-09 DIAGNOSIS — E78.00 PURE HYPERCHOLESTEROLEMIA: Chronic | ICD-10-CM

## 2024-07-09 DIAGNOSIS — I50.32 CHRONIC DIASTOLIC CONGESTIVE HEART FAILURE: Chronic | ICD-10-CM

## 2024-07-09 DIAGNOSIS — Z79.02 LONG TERM (CURRENT) USE OF ANTITHROMBOTICS/ANTIPLATELETS: Chronic | ICD-10-CM

## 2024-07-09 PROCEDURE — 99214 OFFICE O/P EST MOD 30 MIN: CPT | Mod: PBBFAC,PO | Performed by: INTERNAL MEDICINE

## 2024-07-09 PROCEDURE — 99999 PR PBB SHADOW E&M-EST. PATIENT-LVL IV: CPT | Mod: PBBFAC,,, | Performed by: INTERNAL MEDICINE

## 2024-07-09 PROCEDURE — 99204 OFFICE O/P NEW MOD 45 MIN: CPT | Mod: S$PBB,,, | Performed by: INTERNAL MEDICINE

## 2024-07-09 NOTE — PROGRESS NOTES
Subjective:    Patient ID:  Paola Wood is a 73 y.o. female who presents for Coronary Artery Disease        HPI  NEW PATIENT EVALUATION , RECORDS REVIEWED AS EXTENSIVE PAST MEDICAL HISTORY CARDIAC HISTORY INCLUDING CABG, HEART FAILURE, THE PATIENT IS CONFUSED WITH HER HISTORY AND BLEED FOLLOWED MULTIPLE PLACES WAS FOLLOWED BY DR. KRISTIE SAXENA AT [place] THEN [facility], STATUS POST ER AT [facility] WITH ACUTE HEART FAILURE THEN SEEN BY DR. KEN, FATIGUE, DOES NOT SLEEP WELL, WAS GIVEN VISTARIL,  RECENT ECHO IN MAY NORMAL LV FX, MILD AV SCLEROSIS, STATES WAS ASKED TO FOLLOW HERE, REPORTS SOME LINN, BETTER SINCE PCI, JUST STARTED REPATHA, STATES NOT AWARE OF CKD, LAST GFR 40 UP, SEE [facility]     CARDIAC CATHETERIZATION APRIL AT [facility]:       1. Angiographically severe coronary artery disease with heavy   calcification with occluded graft to the left coronaries and atretic left   internal mammary artery.      2. Patent graft to the RCA PDA with patent stent in the mid graft.      3. Normal left ventricular end-diastolic pressure.      4. Normal ejection fraction.      5. Successful high risk percutaneous coronary intervention with   reconstruction of the left system with multiple stents to left anterior   descending and left circumflex using mini-crush technique in OM1 and OM2   bifurcation and in the left main left   anterior descending, left circumflex bifurcation with good angiographic result      Past procedures  1. CAD s/p PTCA X 2 mRCA 8/10, 9/10; s/p CABG X 4v (LIMA-LAD-atretic, SVG-OM1/OM2, SVG-RCA) 2011; s/pPCI mLAD 3.0 X 12 mm Promus 4/13; s/p PCI SVG-PDA 3.5 X 15 mm Ab 1/3/2023         Past Medical History:   Diagnosis Date    Allergy     AR (allergic rhinitis)     Asthma     CAD (coronary artery disease)     CHF (congestive heart failure)     Chronic pain     neck/back/arm/knee    GERD (gastroesophageal reflux disease)     Hypertension     Seizures     SOB (shortness of breath)     TIA  (transient ischemic attack)      Past Surgical History:   Procedure Laterality Date    CATARACT EXTRACTION      CORONARY ANGIOPLASTY WITH STENT PLACEMENT      CORONARY ARTERY BYPASS GRAFT      HYSTERECTOMY  1983    partial (Ovaries removed 1998)    OOPHORECTOMY  1998     Family History   Problem Relation Name Age of Onset    Heart disease Mother      Heart disease Father      No Known Problems Maternal Grandmother      No Known Problems Maternal Grandfather      No Known Problems Paternal Grandmother      No Known Problems Paternal Grandfather      Breast cancer Sister  53     Social History     Socioeconomic History    Marital status:    Tobacco Use    Smoking status: Never    Smokeless tobacco: Never   Substance and Sexual Activity    Alcohol use: No    Drug use: No    Sexual activity: Not Currently     Social Determinants of Health     Food Insecurity: No Food Insecurity (5/9/2024)    Hunger Vital Sign     Worried About Running Out of Food in the Last Year: Never true     Ran Out of Food in the Last Year: Never true   Transportation Needs: No Transportation Needs (5/9/2024)    TRANSPORTATION NEEDS     Transportation : No   Housing Stability: Low Risk  (5/9/2024)    Housing Stability Vital Sign     Unable to Pay for Housing in the Last Year: No     Homeless in the Last Year: No       Review of patient's allergies indicates:   Allergen Reactions    Ace inhibitors Swelling and Itching     Other reaction(s): tongue swelling    Other reaction(s): Other (See Comments)    Mild itching    Lisinopril      Other reaction(s): Not available    Neomycin     Niacin     Polymyxin b     Atorvastatin Rash and Other (See Comments)     Myalgia    Bacitracin Itching     Mild itching    Ezetimibe Other (See Comments)     Myalgia    Rosuvastatin Nausea And Vomiting and Itching     Myalgia    Simvastatin Itching     Mild itching    Statins-hmg-coa reductase inhibitors Nausea And Vomiting     Other reaction(s): Not available        Current Outpatient Medications:     aspirin (ECOTRIN) 81 MG EC tablet, Take 81 mg by mouth once daily., Disp: , Rfl:     cloNIDine (CATAPRES) 0.2 MG tablet, Take 1 tablet (0.2 mg total) by mouth as needed., Disp: 30 tablet, Rfl: 0    clopidogreL (PLAVIX) 75 mg tablet, Take 1 tablet (75 mg total) by mouth once daily., Disp: 90 tablet, Rfl: 3    empagliflozin (JARDIANCE) 10 mg tablet, Take 10 mg by mouth once daily., Disp: , Rfl:     evolocumab (REPATHA SURECLICK) 140 mg/mL PnIj, Inject 1 mL (140 mg total) into the skin every 14 (fourteen) days., Disp: 6 each, Rfl: 3    losartan (COZAAR) 50 MG tablet, Take 50 mg by mouth., Disp: , Rfl:     metoprolol succinate (TOPROL-XL) 50 MG 24 hr tablet, Take 0.5 tablets (25 mg total) by mouth 2 (two) times daily., Disp: 90 tablet, Rfl: 3    nitroGLYCERIN (NITROSTAT) 0.4 MG SL tablet, Place 1 tablet (0.4 mg total) under the tongue every 5 (five) minutes as needed for Chest pain. use as directed, Disp: 25 tablet, Rfl: 0    oxyCODONE-acetaminophen (PERCOCET)  mg per tablet, Take 1 tablet by mouth every 4 (four) hours as needed., Disp: , Rfl:     pantoprazole (PROTONIX) 40 MG tablet, Take 1 tablet (40 mg total) by mouth once daily. PROTONIX 40 MG TBEC, Disp: 90 tablet, Rfl: 3    potassium chloride SA (K-DUR,KLOR-CON) 20 MEQ tablet, Take 1 tablet (20 mEq total) by mouth once daily., Disp: 90 tablet, Rfl: 3    ranolazine (RANEXA) 500 MG Tb12, Take 1 tablet (500 mg total) by mouth 2 (two) times daily., Disp: 90 tablet, Rfl: 3    sacubitriL-valsartan (ENTRESTO) 24-26 mg per tablet, Take 1 tablet by mouth 2 (two) times daily., Disp: , Rfl:     amLODIPine (NORVASC) 5 MG tablet, Take 5 mg by mouth. (Patient not taking: Reported on 7/9/2024), Disp: , Rfl:     furosemide (LASIX) 20 MG tablet, Take 1 tablet (20 mg total) by mouth daily as needed. (Patient not taking: Reported on 7/9/2024), Disp: 90 tablet, Rfl: 3    hydrOXYzine pamoate (VISTARIL) 50 MG Cap, Take 1 capsule (50 mg  total) by mouth nightly as needed. (Patient not taking: Reported on 7/9/2024), Disp: 30 capsule, Rfl: 0    isosorbide mononitrate (IMDUR) 30 MG 24 hr tablet, Take 30 mg by mouth every morning. (Patient not taking: Reported on 7/9/2024), Disp: , Rfl:     mirtazapine (REMERON) 7.5 MG Tab, Take 1 tablet (7.5 mg total) by mouth every evening. (Patient not taking: Reported on 7/8/2024), Disp: 30 tablet, Rfl: 11    traZODone (DESYREL) 50 MG tablet, Take 1 tablet (50 mg total) by mouth every evening. (Patient not taking: Reported on 7/8/2024), Disp: 30 tablet, Rfl: 11    Review of Systems   Constitutional: Negative for chills, decreased appetite, diaphoresis, fever, malaise/fatigue, night sweats, weight gain and weight loss.   HENT:  Negative for congestion and nosebleeds.    Eyes:  Negative for blurred vision and visual disturbance.   Cardiovascular:  Positive for dyspnea on exertion. Negative for chest pain, claudication, cyanosis, irregular heartbeat, leg swelling (SOME), near-syncope, orthopnea, palpitations, paroxysmal nocturnal dyspnea and syncope.   Respiratory:  Negative for cough, hemoptysis, shortness of breath and wheezing.    Endocrine: Negative for cold intolerance, heat intolerance, polydipsia and polyuria.   Hematologic/Lymphatic: Negative for adenopathy and bleeding problem. Does not bruise/bleed easily.   Skin:  Negative for color change, itching, poor wound healing and rash.   Musculoskeletal:  Positive for arthritis. Negative for back pain.   Gastrointestinal:  Negative for abdominal pain, change in bowel habit, heartburn, hematemesis, hematochezia and hemorrhoids.   Genitourinary:  Negative for bladder incontinence, decreased libido, frequency and hematuria.   Neurological:  Negative for brief paralysis, difficulty with concentration, excessive daytime sleepiness, dizziness, focal weakness, headaches, numbness, vertigo and weakness.   Psychiatric/Behavioral:  Positive for memory loss. Negative for  "altered mental status and depression. The patient has insomnia.    Allergic/Immunologic: Negative for environmental allergies and persistent infections.        Objective:      Vitals:    07/09/24 1536   BP: 132/62   Pulse: (!) 52   Weight: 69.8 kg (153 lb 14.1 oz)   Height: 5' 3.5" (1.613 m)   PainSc: 10-Worst pain ever     Body mass index is 26.83 kg/m².    Physical Exam  Constitutional:       Appearance: Normal appearance. She is overweight.   HENT:      Head: Normocephalic and atraumatic.   Eyes:      Extraocular Movements: Extraocular movements intact.      Conjunctiva/sclera: Conjunctivae normal.   Neck:      Vascular: Carotid bruit present. No JVD.   Cardiovascular:      Rate and Rhythm: Normal rate and regular rhythm. No extrasystoles are present.     Pulses:           Carotid pulses are 2+ on the right side with bruit and 2+ on the left side.       Radial pulses are 2+ on the right side and 2+ on the left side.        Posterior tibial pulses are 2+ on the right side and 2+ on the left side.      Heart sounds: Murmur heard.      Systolic murmur is present with a grade of 1/6 at the upper right sternal border.      No friction rub. No S4 sounds.   Pulmonary:      Effort: Pulmonary effort is normal.      Breath sounds: Normal breath sounds and air entry.   Chest:       Abdominal:      General: Bowel sounds are normal.      Tenderness: There is no abdominal tenderness.   Musculoskeletal:      Cervical back: Neck supple.      Right lower leg: Right lower leg edema: TRACE.      Left lower leg: Left lower leg edema: TRACE.      Comments: SLOW GAIT   Neurological:      Mental Status: She is alert.   Psychiatric:         Mood and Affect: Mood normal.         Speech: Speech normal.         Behavior: Behavior normal.                 ..    Chemistry        Component Value Date/Time     05/14/2024 0747    K 4.0 05/14/2024 0747     05/14/2024 0747    CO2 21 (L) 05/14/2024 0747    BUN 27 (H) 05/14/2024 0747    " CREATININE 1.4 05/14/2024 0747    GLU 98 05/14/2024 0747        Component Value Date/Time    CALCIUM 9.0 05/14/2024 0747    ALKPHOS 91 05/12/2024 0550    AST 13 05/12/2024 0550    ALT 8 (L) 05/12/2024 0550    BILITOT 0.4 05/12/2024 0550            ..  Lab Results   Component Value Date    CHOL 363 (H) 04/15/2024    CHOL 381 (H) 08/27/2023    CHOL 251 (H) 01/01/2023     Lab Results   Component Value Date    HDL 46 04/15/2024    HDL 53 08/27/2023    HDL 48 01/01/2023     Lab Results   Component Value Date    LDLCALC 286 (H) 04/15/2024    LDLCALC 290 (H) 08/27/2023    LDLCALC 183 (H) 01/01/2023     Lab Results   Component Value Date    TRIG 154 04/15/2024    TRIG 191 08/27/2023    TRIG 99 01/01/2023     Lab Results   Component Value Date    CHOLHDL 7.9 (H) 04/15/2024    CHOLHDL 7.2 (H) 08/27/2023    CHOLHDL 5.2 (H) 01/01/2023     ..  Lab Results   Component Value Date    WBC 7.76 05/13/2024    HGB 10.1 (L) 05/13/2024    HCT 33.2 (L) 05/13/2024    MCV 93 05/13/2024     05/13/2024       Test(s) Reviewed  I have reviewed the following in detail:  [] Stress test   [x] Angiography   [x] Echocardiogram   [x] Labs   [x] Other:       Assessment:         ICD-10-CM ICD-9-CM   1. Coronary artery disease of bypass graft of native heart with stable angina pectoris  I25.708 414.05     413.9   2. Arterial bruit  R09.89 785.9   3. Chronic diastolic congestive heart failure  I50.32 428.32     428.0   4. Athscl heart disease of native coronary artery w/o ang pctrs  I25.10 414.01   5. Pure hypercholesterolemia  E78.00 272.0   6. Stage 3b chronic kidney disease  N18.32 585.3   7. Stented coronary artery  Z95.5 V45.82   8. Primary hypertension  I10 401.9   9. Long term (current) use of antithrombotics/antiplatelets  Z79.02 V58.63     Problem List Items Addressed This Visit          Cardiac/Vascular    [facility] (congestive cardiac failure)    Relevant Orders    Nuclear Stress - Cardiology Interpreted    Coronary artery disease of bypass  graft of native heart with stable angina pectoris - Primary    Overview     Intolerant of statins   Referral to cards          Relevant Orders    Nuclear Stress - Cardiology Interpreted    Hyperlipidemia    Hypertension    Overview     -at goal today  - Current Hypertension Medications:   Hypertension Medications               amlodipine (NORVASC) 5 MG tablet Take 1 tablet (5 mg total) by mouth once daily.    cloNIDine (CATAPRES) 0.2 MG tablet Take 1 tablet (0.2 mg total) by mouth as needed.    losartan (COZAAR) 100 MG tablet 1 po daily    metoprolol succinate (TOPROL XL) 50 MG 24 hr tablet 1 and 1/2 in the AM.  1 and 1/2 at night.    NITROSTAT 0.4 mg SL tablet           -continue lifestyle modification with low sodium diet and exercise   -discussed hypertension disease course and importance of treating high blood pressure  -patient understood and advised of risk of untreated blood pressure.  ER precautions were given   for symptoms of hypertensive urgency and emergency.           Athscl heart disease of native coronary artery w/o ang pctrs    Overview     Chronic history; stable  Continue Plavix  Referral to heart failure clinic  GDMT: bb, ENTRESTO, IMDUR         Stented coronary artery    Relevant Orders    Nuclear Stress - Cardiology Interpreted    Arterial bruit    Relevant Orders    CV Ultrasound Bilateral Doppler Carotid       Renal/    Stage 3b chronic kidney disease    Overview       Lab Results   Component Value Date     05/14/2024    K 4.0 05/14/2024     05/14/2024    CO2 21 (L) 05/14/2024    BUN 27 (H) 05/14/2024    CREATININE 1.4 05/14/2024    CALCIUM 9.0 05/14/2024    ANIONGAP 9 05/14/2024    EGFRNORACEVR 40 (A) 05/14/2024     Chronic; stable   Avoid nephrotoxic agents               Hematology    Long term (current) use of antithrombotics/antiplatelets        Plan:     CHECK CAROTID ULTRASOUND NUCLEAR STRESS TEST ASSESS FOR RESIDUAL ISCHEMIA IN VIEW OF CURRENT SYMPTOMS, DAILY WEIGHT 2 LB  PER DAY 5 LB PER WEEK RULE, CLINICALLY NO OVERT HEART FAILURE NO CLINICAL ARRHYTHMIA DIET EXERCISE INCREASE ACTIVITY, DECREASE SEDATIVES, RETURN TO CLINIC IN FEW WEEKS AFTER TESTS IF SHE WANTS TO FOLLOW-UP IN THIS CLINIC, INCREASED CV RISK WITH CKD PATIENT STATES NOT AWARE OF THE DEGREE OF KIDNEY DISEASE      Coronary artery disease of bypass graft of native heart with stable angina pectoris  -     Nuclear Stress - Cardiology Interpreted; Future    Arterial bruit  -     CV Ultrasound Bilateral Doppler Carotid; Future    Chronic diastolic congestive heart failure  -     Ambulatory referral/consult to General Congestive Heart Failure Clinic  -     Nuclear Stress - Cardiology Interpreted; Future    Athscl heart disease of native coronary artery w/o ang pctrs  -     Ambulatory referral/consult to General Congestive Heart Failure Clinic    Pure hypercholesterolemia    Stage 3b chronic kidney disease  Comments:  PATIENT STATES NOT AWARE OF KIDNEY DISEASE    Stented coronary artery  -     Nuclear Stress - Cardiology Interpreted; Future    Primary hypertension    Long term (current) use of antithrombotics/antiplatelets    RTC Low level/low impact aerobic exercise 5x's/wk. Heart healthy diet and risk factor modification.    See labs and med orders.    Aerobic exercise 5x's/wk. Heart healthy diet and risk factor modification.    See labs and med orders.

## 2024-07-10 ENCOUNTER — TELEPHONE (OUTPATIENT)
Dept: FAMILY MEDICINE | Facility: CLINIC | Age: 74
End: 2024-07-10
Payer: MEDICARE

## 2024-07-10 DIAGNOSIS — G47.00 INSOMNIA, UNSPECIFIED TYPE: Primary | ICD-10-CM

## 2024-07-10 PROBLEM — Z79.02 LONG TERM (CURRENT) USE OF ANTITHROMBOTICS/ANTIPLATELETS: Status: ACTIVE | Noted: 2024-07-10

## 2024-07-10 PROBLEM — R09.89 ARTERIAL BRUIT: Status: ACTIVE | Noted: 2024-07-10

## 2024-07-10 NOTE — TELEPHONE ENCOUNTER
----- Message from Martha Mo sent at 7/10/2024  2:53 PM CDT -----  Patient stated she haven't slept in 2 days and had a reaction to medication. Call back number is  [phone]. Thx.EL

## 2024-07-10 NOTE — PROGRESS NOTES
Subjective     Patient ID: Paola Wood is a 73 y.o. female.    Chief Complaint: Insomnia  Pt in today for insomnia; states began in May after stopping elavil. Pt prescribed trazodone; states stopped taking on her own; states not working; declines increase; requesting different medication. Pt states unable to tolerate remeron previously prescribed. Pt states took two doses of ambien 10 mg from her ex-; states did not stay asleep after taking. Pt denies snoring, apneic episodes; declines referral to sleep disorders clinic at this time. Pt has no other complaints today.  Past Medical History:   Diagnosis Date    Allergy     AR (allergic rhinitis)     Asthma     CAD (coronary artery disease)     CHF (congestive heart failure)     Chronic pain     neck/back/arm/knee    GERD (gastroesophageal reflux disease)     Hypertension     Seizures     SOB (shortness of breath)     TIA (transient ischemic attack)      Social History     Socioeconomic History    Marital status:    Tobacco Use    Smoking status: Never    Smokeless tobacco: Never   Substance and Sexual Activity    Alcohol use: No    Drug use: No    Sexual activity: Not Currently     Social Determinants of Health     Food Insecurity: No Food Insecurity (5/9/2024)    Hunger Vital Sign     Worried About Running Out of Food in the Last Year: Never true     Ran Out of Food in the Last Year: Never true   Transportation Needs: No Transportation Needs (5/9/2024)    TRANSPORTATION NEEDS     Transportation : No   Housing Stability: Low Risk  (5/9/2024)    Housing Stability Vital Sign     Unable to Pay for Housing in the Last Year: No     Homeless in the Last Year: No     Past Surgical History:   Procedure Laterality Date    CATARACT EXTRACTION      CORONARY ANGIOPLASTY WITH STENT PLACEMENT      CORONARY ARTERY BYPASS GRAFT      HYSTERECTOMY  1983    partial (Ovaries removed 1998)    OOPHORECTOMY  1998       [facility]  Review of Systems   Constitutional:  Negative.    HENT: Negative.     Eyes: Negative.    Respiratory: Negative.     Cardiovascular: Negative.    Gastrointestinal: Negative.    Endocrine: Negative.    Genitourinary: Negative.    Musculoskeletal: Negative.    Integumentary:  Negative.   Allergic/Immunologic: Negative.    Neurological: Negative.    Psychiatric/Behavioral:  Positive for sleep disturbance.           Objective     Physical Exam  Vitals and nursing note reviewed.   Constitutional:       Appearance: Normal appearance.   HENT:      Head: Normocephalic.      Right Ear: Tympanic membrane, ear canal and external ear normal.      Left Ear: Tympanic membrane, ear canal and external ear normal.      Nose: Nose normal.      Mouth/Throat:      Mouth: Mucous membranes are moist.      Pharynx: Oropharynx is clear.   Eyes:      Conjunctiva/sclera: Conjunctivae normal.      Pupils: Pupils are equal, round, and reactive to light.   Cardiovascular:      Rate and Rhythm: Normal rate and regular rhythm.      Pulses: Normal pulses.      Heart sounds: Normal heart sounds.   Pulmonary:      Effort: Pulmonary effort is normal.      Breath sounds: Normal breath sounds.   Abdominal:      General: Bowel sounds are normal.      Palpations: Abdomen is soft.   Musculoskeletal:         General: Normal range of motion.      Cervical back: Normal range of motion and neck supple.   Skin:     General: Skin is warm and dry.      Capillary Refill: Capillary refill takes 2 to 3 seconds.   Neurological:      Mental Status: She is alert and oriented to person, place, and time.   Psychiatric:         Mood and Affect: Mood normal.         Behavior: Behavior normal.         Thought Content: Thought content normal.         Judgment: Judgment normal.            Assessment and Plan     1. Insomnia, unspecified type  -     hydrOXYzine pamoate (VISTARIL) 50 MG Cap; Take 1 capsule (50 mg total) by mouth nightly as needed. (Patient not taking: Reported on 7/9/2024)  Dispense: 30 capsule;  Refill: 0  Hydrate well  Rest  Report to ER immediately if symptoms worsen or persist               No follow-ups on file.

## 2024-07-10 NOTE — TELEPHONE ENCOUNTER
Pt called requesting an alternate medication. Pt reports she was seen on 07/08/2024 by Marika Costa DNP  and was given hydrOXYzine pamoate (VISTARIL) 50 MG Cap. Pt reports this medication caused swelling to her face and eyes. Pt requesting xanax as her find has told her about this medication. Please advise

## 2024-07-10 NOTE — TELEPHONE ENCOUNTER
I spoke with the patient about this. Pt reports the traZODone (DESYREL) 50 MG tablet does not work. Pt reports she has not has any falls since May after stopping the amitriptyline (ELAVIL) 150 MG Tab . Pt states since stopping the hydroxyzine which was prescribed on 07/08/2024 by Marika, the swelling has decreased and is not bothersome. ED precuations discussed with patient. Pt requesting an alternate medication for insomina.

## 2024-07-10 NOTE — TELEPHONE ENCOUNTER
Unfortunately, xanax is an unsafe medication for patients with history of falling. Was trazodone working?      For facial redness and eye swelling please to go to the emergency for allergic reaction treatment.    -Dr. Duron

## 2024-07-11 ENCOUNTER — TELEPHONE (OUTPATIENT)
Dept: FAMILY MEDICINE | Facility: CLINIC | Age: 74
End: 2024-07-11
Payer: MEDICARE

## 2024-07-11 RX ORDER — RAMELTEON 8 MG/1
8 TABLET ORAL NIGHTLY
Qty: 90 TABLET | Refills: 3 | Status: SHIPPED | OUTPATIENT
Start: 2024-07-11

## 2024-07-11 NOTE — TELEPHONE ENCOUNTER
Can try ramelteon for sleep    Medications Ordered This Encounter   Medications    ramelteon (ROZEREM) 8 mg tablet     Sig: Take 1 tablet (8 mg total) by mouth every evening.     Dispense:  90 tablet     Refill:  3

## 2024-07-12 ENCOUNTER — TELEPHONE (OUTPATIENT)
Dept: FAMILY MEDICINE | Facility: CLINIC | Age: 74
End: 2024-07-12
Payer: MEDICARE

## 2024-07-12 NOTE — TELEPHONE ENCOUNTER
----- Message from Sissy Mims sent at 7/12/2024  2:09 PM CDT -----  The pt is calling because she is still having terrible headaches and still not able to sleep. She is stated that her medication is not working. Please give a call back at [phone]

## 2024-07-12 NOTE — TELEPHONE ENCOUNTER
Spoke with pt, states that medication that was sent in yesterday has not helped her sleep. Pt still requesting a rx for Xanax to be sent in . Pls advise.

## 2024-07-12 NOTE — TELEPHONE ENCOUNTER
----- Message from Melony Crouch sent at 7/12/2024  2:17 PM CDT -----  Name of Who is Calling:pt           What is the request in detail:requesting a call regarding medications that she has been given and not being able to sleep, please call asap           Can the clinic reply by [facility]:no           [facility] [phone]

## 2024-07-18 ENCOUNTER — HOSPITAL ENCOUNTER (OUTPATIENT)
Dept: CARDIOLOGY | Facility: HOSPITAL | Age: 74
Discharge: HOME | End: 2024-07-18
Attending: INTERNAL MEDICINE
Payer: MEDICARE

## 2024-07-18 VITALS
DIASTOLIC BLOOD PRESSURE: 62 MMHG | HEART RATE: 47 BPM | WEIGHT: 153 LBS | BODY MASS INDEX: 27.11 KG/M2 | HEIGHT: 63 IN | SYSTOLIC BLOOD PRESSURE: 132 MMHG

## 2024-07-18 PROCEDURE — 93306 TTE W/DOPPLER COMPLETE: CPT | Mod: 26,,, | Performed by: INTERNAL MEDICINE

## 2024-07-18 PROCEDURE — 93306 TTE W/DOPPLER COMPLETE: CPT | Mod: PO

## 2024-07-22 ENCOUNTER — OFFICE VISIT (OUTPATIENT)
Dept: CARDIOLOGY | Facility: CLINIC | Age: 74
End: 2024-07-22
Payer: MEDICARE

## 2024-07-22 VITALS
DIASTOLIC BLOOD PRESSURE: 72 MMHG | WEIGHT: 148 LBS | SYSTOLIC BLOOD PRESSURE: 154 MMHG | HEART RATE: 55 BPM | HEIGHT: 64 IN | BODY MASS INDEX: 25.27 KG/M2

## 2024-07-22 DIAGNOSIS — I25.708 CORONARY ARTERY DISEASE OF BYPASS GRAFT OF NATIVE HEART WITH STABLE ANGINA PECTORIS: ICD-10-CM

## 2024-07-22 DIAGNOSIS — Z95.5 STENTED CORONARY ARTERY: ICD-10-CM

## 2024-07-22 DIAGNOSIS — I25.10 ATHSCL HEART DISEASE OF NATIVE CORONARY ARTERY W/O ANG PCTRS: ICD-10-CM

## 2024-07-22 DIAGNOSIS — Z95.1 S/P CABG (CORONARY ARTERY BYPASS GRAFT): ICD-10-CM

## 2024-07-22 DIAGNOSIS — I10 PRIMARY HYPERTENSION: ICD-10-CM

## 2024-07-22 DIAGNOSIS — I50.33 ACUTE ON CHRONIC DIASTOLIC (CONGESTIVE) HEART FAILURE: ICD-10-CM

## 2024-07-22 DIAGNOSIS — Z98.61 S/P PTCA (PERCUTANEOUS TRANSLUMINAL CORONARY ANGIOPLASTY): ICD-10-CM

## 2024-07-22 DIAGNOSIS — E78.2 MIXED HYPERLIPIDEMIA: Primary | ICD-10-CM

## 2024-07-22 PROCEDURE — 99213 OFFICE O/P EST LOW 20 MIN: CPT | Mod: PBBFAC,PO | Performed by: INTERNAL MEDICINE

## 2024-07-22 PROCEDURE — 99214 OFFICE O/P EST MOD 30 MIN: CPT | Mod: S$PBB,,, | Performed by: INTERNAL MEDICINE

## 2024-07-22 PROCEDURE — 99999 PR PBB SHADOW E&M-EST. PATIENT-LVL III: CPT | Mod: PBBFAC,,, | Performed by: INTERNAL MEDICINE

## 2024-07-22 NOTE — PROGRESS NOTES
Subjective   Patient ID:  Paola Wood is a 73 y.o. female who presents for follow-up of Follow-up (6 weeks)      HPII Last visit 72 yo WF with extensive cardiac hx recently in [place] and underwent procedures as described below. Has severe HLD but has been intolerant of statins.She currently states she has SOB but no CP. We reviewed her meds and she was not aware of Rx for praluent. She has filled the praluent RX and has had three doses. Still confused about her meds. Will have her bring in all meds next visit. Saw Dr Lincoln in [place] but wants to be followed here. No CP. SOB stable.     ASSESSMENT AND PLAN: 4-       1. Angiographically severe coronary artery disease with heavy   calcification with occluded graft to the left coronaries and atretic left   internal mammary artery.      2. Patent graft to the RCA PDA with patent stent in the mid graft.      3. Normal left ventricular end-diastolic pressure.      4. Normal ejection fraction.      5. Successful high risk percutaneous coronary intervention with   reconstruction of the left system with multiple stents to left anterior   descending and left circumflex using mini-crush technique in OM1 and OM2   bifurcation and in the left main left   anterior descending, left circumflex bifurcation with good angiographic result      Past procedures  1. CAD s/p PTCA X 2 mRCA 8/10, 9/10; s/p CABG X 4v (LIMA-LAD-atretic, SVG-OM1/OM2, SVG-RCA) 2011; s/pPCI mLAD 3.0 X 12 mm Promus 4/13; s/p PCI SVG-PDA 3.5 X 15 mm [place] 1/3/2023      Summary  Show Result Comparison     Left Ventricle: The left ventricle is normal in size. There is concentric remodeling. There is normal systolic function. Ejection fraction by visual approximation is 70%. Grade II diastolic dysfunction.    Right Ventricle: Normal right ventricular cavity size. Systolic function is borderline low.    Left Atrium: Left atrium is moderately dilated.    Aortic Valve: Aortic valve peak velocity is  2.05 m/s. Mean gradient is 9 mmHg.    Mitral Valve: There is mild regurgitation with a centrally directed jet.    Tricuspid Valve: There is mild regurgitation.    Pulmonary Artery: The estimated pulmonary artery systolic pressure is 25 mmHg.    IVC/SVC: Normal venous pressure at 3 mmHg.    Review of Systems   Constitutional: Negative for decreased appetite, fever, malaise/fatigue, weight gain and weight loss.   HENT:  Negative for hearing loss and nosebleeds.    Eyes:  Negative for visual disturbance.   Cardiovascular:  Positive for dyspnea on exertion. Negative for chest pain, claudication, cyanosis, irregular heartbeat, leg swelling, near-syncope, orthopnea, palpitations, paroxysmal nocturnal dyspnea and syncope.   Respiratory:  Positive for shortness of breath. Negative for cough, hemoptysis, sleep disturbances due to breathing, snoring and wheezing.    Endocrine: Negative for cold intolerance, heat intolerance, polydipsia and polyuria.   Hematologic/Lymphatic: Negative for adenopathy and bleeding problem. Does not bruise/bleed easily.   Skin:  Negative for color change, itching, poor wound healing, rash and suspicious lesions.   Musculoskeletal:  Negative for arthritis, back pain, falls, joint pain, joint swelling, muscle cramps, muscle weakness and myalgias.   Gastrointestinal:  Negative for bloating, abdominal pain, change in bowel habit, constipation, flatus, heartburn, hematemesis, hematochezia, hemorrhoids, jaundice, melena, nausea and vomiting.   Genitourinary:  Negative for bladder incontinence, decreased libido, frequency, hematuria, hesitancy and urgency.   Neurological:  Negative for brief paralysis, difficulty with concentration, excessive daytime sleepiness, dizziness, focal weakness, headaches, light-headedness, loss of balance, numbness, vertigo and weakness.   Psychiatric/Behavioral:  Negative for altered mental status, depression and memory loss. The patient does not have insomnia and is not  "nervous/anxious.    Allergic/Immunologic: Negative for environmental allergies, hives and persistent infections.          Objective     Physical Exam  Vitals and nursing note reviewed.   Constitutional:       Appearance: She is well-developed.      Comments: BP (!) 154/72 (BP Location: Left arm, Patient Position: Sitting, BP Method: Large (Manual))   Pulse (!) 55   Ht 5' 3.5" (1.613 m)   Wt 67.1 kg (148 lb)   BMI 25.81 kg/m²      HENT:      Head: Normocephalic and atraumatic.      Right Ear: External ear normal.      Left Ear: External ear normal.      Nose: Nose normal.   Eyes:      General: Lids are normal. No scleral icterus.        Right eye: No discharge.         Left eye: No discharge.      Conjunctiva/sclera: Conjunctivae normal.      Right eye: No hemorrhage.     Pupils: Pupils are equal, round, and reactive to light.   Neck:      Thyroid: No thyromegaly.      Vascular: No JVD.      Trachea: No tracheal deviation.   Cardiovascular:      Rate and Rhythm: Normal rate and regular rhythm.      Pulses: Intact distal pulses.      Heart sounds: Normal heart sounds. No murmur heard.     No friction rub. No gallop.   Pulmonary:      Effort: Pulmonary effort is normal. No respiratory distress.      Breath sounds: Normal breath sounds. No wheezing or rales.   Chest:      Chest wall: No tenderness.   Breasts:     Breasts are symmetrical.   Abdominal:      General: Bowel sounds are normal. There is no distension.      Palpations: Abdomen is soft. There is no hepatomegaly or mass.      Tenderness: There is no abdominal tenderness. There is no guarding or rebound.   Musculoskeletal:         General: No tenderness. Normal range of motion.      Cervical back: Normal range of motion and neck supple.   Lymphadenopathy:      Cervical: No cervical adenopathy.   Skin:     General: Skin is warm and dry.      Coloration: Skin is not pale.      Findings: No erythema or rash.   Neurological:      Mental Status: She is alert and " oriented to person, place, and time.      Cranial Nerves: No cranial nerve deficit.      Coordination: Coordination normal.      Deep Tendon Reflexes: Reflexes are normal and symmetric. Reflexes normal.   Psychiatric:         Behavior: Behavior normal.         Thought Content: Thought content normal.         Judgment: Judgment normal.            Assessment and Plan     1. Mixed hyperlipidemia    2. Acute on chronic diastolic (congestive) heart failure    3. Athscl heart disease of native coronary artery w/o ang pctrs    4. Coronary artery disease of bypass graft of native heart with stable angina pectoris    5. Primary hypertension    6. S/P CABG (coronary artery bypass graft)    7. S/P PTCA (percutaneous transluminal coronary angioplasty)    8. Stented coronary artery        Plan:  Asked her to bring meds to clinic to review   Cardiac status stable   Patient advised to modify risk factors such as weight, exercise, diet,  tobacco and alcohol exposure    Low salt diet   Orders Placed This Encounter   Procedures    Lipid Panel    Comprehensive Metabolic Panel     No follow-ups on file.     Advance Care Planning     Date: 07/22/2024

## 2024-07-29 ENCOUNTER — LAB VISIT (OUTPATIENT)
Dept: LAB | Facility: HOSPITAL | Age: 74
End: 2024-07-29
Attending: INTERNAL MEDICINE
Payer: MEDICARE

## 2024-07-29 DIAGNOSIS — E78.2 MIXED HYPERLIPIDEMIA: ICD-10-CM

## 2024-07-29 LAB
ALBUMIN SERPL BCP-MCNC: 3.4 G/DL (ref 3.5–5.2)
ALP SERPL-CCNC: 68 U/L (ref 55–135)
ALT SERPL W/O P-5'-P-CCNC: 12 U/L (ref 10–44)
ANION GAP SERPL CALC-SCNC: 7 MMOL/L (ref 8–16)
AST SERPL-CCNC: 14 U/L (ref 10–40)
BILIRUB SERPL-MCNC: 0.4 MG/DL (ref 0.1–1)
BUN SERPL-MCNC: 14 MG/DL (ref 8–23)
CALCIUM SERPL-MCNC: 9.4 MG/DL (ref 8.7–10.5)
CHLORIDE SERPL-SCNC: 109 MMOL/L (ref 95–110)
CHOLEST SERPL-MCNC: 255 MG/DL (ref 120–199)
CHOLEST/HDLC SERPL: 4.7 {RATIO} (ref 2–5)
CO2 SERPL-SCNC: 24 MMOL/L (ref 23–29)
CREAT SERPL-MCNC: 1 MG/DL (ref 0.5–1.4)
EST. GFR  (NO RACE VARIABLE): 59.9 ML/MIN/1.73 M^2
GLUCOSE SERPL-MCNC: 110 MG/DL (ref 70–110)
HDLC SERPL-MCNC: 54 MG/DL (ref 40–75)
HDLC SERPL: 21.2 % (ref 20–50)
LDLC SERPL CALC-MCNC: 180.6 MG/DL (ref 63–159)
NONHDLC SERPL-MCNC: 201 MG/DL
POTASSIUM SERPL-SCNC: 3.9 MMOL/L (ref 3.5–5.1)
PROT SERPL-MCNC: 6.9 G/DL (ref 6–8.4)
SODIUM SERPL-SCNC: 140 MMOL/L (ref 136–145)
TRIGL SERPL-MCNC: 102 MG/DL (ref 30–150)

## 2024-07-29 PROCEDURE — 36415 COLL VENOUS BLD VENIPUNCTURE: CPT | Mod: PO | Performed by: INTERNAL MEDICINE

## 2024-07-29 PROCEDURE — 80061 LIPID PANEL: CPT | Performed by: INTERNAL MEDICINE

## 2024-07-29 PROCEDURE — 80053 COMPREHEN METABOLIC PANEL: CPT | Performed by: INTERNAL MEDICINE

## 2024-07-30 ENCOUNTER — TELEPHONE (OUTPATIENT)
Dept: CARDIOLOGY | Facility: CLINIC | Age: 74
End: 2024-07-30
Payer: MEDICARE

## 2024-07-30 DIAGNOSIS — E78.01 FAMILIAL HYPERCHOLESTEROLEMIA: Primary | ICD-10-CM

## 2024-07-30 NOTE — TELEPHONE ENCOUNTER
Spoke with patient and scheduled repeat labs for 10/28/24. States she is currently taking repatha. Patient will call back with any further questions or concerns.

## 2024-08-07 ENCOUNTER — OFFICE VISIT (OUTPATIENT)
Dept: FAMILY MEDICINE | Facility: CLINIC | Age: 74
End: 2024-08-07
Payer: MEDICARE

## 2024-08-07 ENCOUNTER — TELEPHONE (OUTPATIENT)
Dept: CARDIOLOGY | Facility: CLINIC | Age: 74
End: 2024-08-07
Payer: MEDICARE

## 2024-08-07 VITALS
HEIGHT: 63 IN | HEART RATE: 64 BPM | WEIGHT: 149.19 LBS | RESPIRATION RATE: 18 BRPM | TEMPERATURE: 98 F | BODY MASS INDEX: 26.43 KG/M2 | SYSTOLIC BLOOD PRESSURE: 137 MMHG | OXYGEN SATURATION: 97 % | DIASTOLIC BLOOD PRESSURE: 72 MMHG

## 2024-08-07 DIAGNOSIS — G47.00 INSOMNIA, UNSPECIFIED TYPE: Primary | ICD-10-CM

## 2024-08-07 DIAGNOSIS — R29.6 RECURRENT FALLS: ICD-10-CM

## 2024-08-07 PROCEDURE — 99215 OFFICE O/P EST HI 40 MIN: CPT | Mod: PBBFAC,PO | Performed by: STUDENT IN AN ORGANIZED HEALTH CARE EDUCATION/TRAINING PROGRAM

## 2024-08-07 PROCEDURE — G2211 COMPLEX E/M VISIT ADD ON: HCPCS | Mod: S$PBB,,, | Performed by: STUDENT IN AN ORGANIZED HEALTH CARE EDUCATION/TRAINING PROGRAM

## 2024-08-07 PROCEDURE — 99999 PR PBB SHADOW E&M-EST. PATIENT-LVL V: CPT | Mod: PBBFAC,,, | Performed by: STUDENT IN AN ORGANIZED HEALTH CARE EDUCATION/TRAINING PROGRAM

## 2024-08-07 PROCEDURE — 99214 OFFICE O/P EST MOD 30 MIN: CPT | Mod: S$PBB,,, | Performed by: STUDENT IN AN ORGANIZED HEALTH CARE EDUCATION/TRAINING PROGRAM

## 2024-08-07 RX ORDER — ALPRAZOLAM 0.25 MG/1
0.25 TABLET ORAL NIGHTLY PRN
Qty: 90 TABLET | Refills: 0 | Status: SHIPPED | OUTPATIENT
Start: 2024-08-07

## 2024-08-12 PROBLEM — J81.0 ACUTE PULMONARY EDEMA: Status: RESOLVED | Noted: 2024-05-08 | Resolved: 2024-08-12

## 2024-08-15 DIAGNOSIS — I10 ESSENTIAL (PRIMARY) HYPERTENSION: ICD-10-CM

## 2024-08-15 NOTE — TELEPHONE ENCOUNTER
No care due was identified.  [facility] Embedded Care Due Messages. Reference number: [id number].   8/15/2024 8:47:50 AM CDT

## 2024-08-16 RX ORDER — LOSARTAN POTASSIUM 50 MG/1
50 TABLET ORAL
Qty: 90 TABLET | Refills: 1 | Status: SHIPPED | OUTPATIENT
Start: 2024-08-16

## 2024-08-16 NOTE — TELEPHONE ENCOUNTER
Refill Routing Note   Medication(s) are not appropriate for processing by [facility] for the following reason(s):        No active prescription written by provider    [facility] action(s):  Defer             Appointments  past 12m or future 3m with PCP    Date Provider   Last Visit   8/7/2024 Grace Duron MD   Next Visit   11/7/2024 Grace Duron MD   ED visits in past 90 days: 0        Note composed:8:12 PM 08/15/2024

## 2024-09-16 DIAGNOSIS — G47.00 INSOMNIA, UNSPECIFIED TYPE: ICD-10-CM

## 2024-09-16 RX ORDER — SACUBITRIL AND VALSARTAN 24; 26 MG/1; MG/1
1 TABLET, FILM COATED ORAL 2 TIMES DAILY
Qty: 60 TABLET | Refills: 3 | OUTPATIENT
Start: 2024-09-16

## 2024-09-16 RX ORDER — ALPRAZOLAM 0.25 MG/1
0.25 TABLET ORAL NIGHTLY PRN
Qty: 90 TABLET | Refills: 0 | Status: SHIPPED | OUTPATIENT
Start: 2024-09-16

## 2024-09-16 NOTE — TELEPHONE ENCOUNTER
Followed up with Paola, scheduled appointment for patient. Patient verbalized understanding of date, time, and location of appointment.

## 2024-09-16 NOTE — TELEPHONE ENCOUNTER
Spoke with pt requesting refill at this time, stated she has been taking two at night which overall has been helping her sleep better. Please advise.

## 2024-09-16 NOTE — TELEPHONE ENCOUNTER
----- Message from Martha Mo sent at 9/16/2024  1:53 PM CDT -----  Name of Caller:.Paola Wood   Best Call Back Number:.[phone]   Additional Information: Patient is requesting a call back but she didn't want to disclose the reason why.

## 2024-09-16 NOTE — TELEPHONE ENCOUNTER
No care due was identified.  [facility] Embedded Care Due Messages. Reference number: [id number].   9/16/2024 2:11:25 PM CDT

## 2024-09-18 ENCOUNTER — OFFICE VISIT (OUTPATIENT)
Dept: CARDIOLOGY | Facility: CLINIC | Age: 74
End: 2024-09-18
Payer: MEDICARE

## 2024-09-18 VITALS
WEIGHT: 150 LBS | HEART RATE: 50 BPM | SYSTOLIC BLOOD PRESSURE: 118 MMHG | BODY MASS INDEX: 25.61 KG/M2 | HEIGHT: 64 IN | DIASTOLIC BLOOD PRESSURE: 60 MMHG

## 2024-09-18 DIAGNOSIS — E78.5 HYPERLIPIDEMIA, UNSPECIFIED HYPERLIPIDEMIA TYPE: ICD-10-CM

## 2024-09-18 DIAGNOSIS — I25.10 ATHSCL HEART DISEASE OF NATIVE CORONARY ARTERY W/O ANG PCTRS: ICD-10-CM

## 2024-09-18 DIAGNOSIS — I25.708 CORONARY ARTERY DISEASE OF BYPASS GRAFT OF NATIVE HEART WITH STABLE ANGINA PECTORIS: ICD-10-CM

## 2024-09-18 DIAGNOSIS — N18.32 STAGE 3B CHRONIC KIDNEY DISEASE: ICD-10-CM

## 2024-09-18 DIAGNOSIS — I50.33 ACUTE ON CHRONIC DIASTOLIC (CONGESTIVE) HEART FAILURE: Primary | ICD-10-CM

## 2024-09-18 DIAGNOSIS — I10 PRIMARY HYPERTENSION: ICD-10-CM

## 2024-09-18 DIAGNOSIS — Z78.9 STATIN INTOLERANCE: ICD-10-CM

## 2024-09-18 DIAGNOSIS — I50.9 CHRONIC CONGESTIVE HEART FAILURE, UNSPECIFIED HEART FAILURE TYPE: ICD-10-CM

## 2024-09-18 DIAGNOSIS — Z98.61 S/P PTCA (PERCUTANEOUS TRANSLUMINAL CORONARY ANGIOPLASTY): ICD-10-CM

## 2024-09-18 DIAGNOSIS — I50.9 HEART FAILURE, UNSPECIFIED HF CHRONICITY, UNSPECIFIED HEART FAILURE TYPE: ICD-10-CM

## 2024-09-18 PROCEDURE — 99214 OFFICE O/P EST MOD 30 MIN: CPT | Mod: S$PBB,,, | Performed by: INTERNAL MEDICINE

## 2024-09-18 PROCEDURE — 99213 OFFICE O/P EST LOW 20 MIN: CPT | Mod: PBBFAC,PO | Performed by: INTERNAL MEDICINE

## 2024-09-18 PROCEDURE — 99999 PR PBB SHADOW E&M-EST. PATIENT-LVL III: CPT | Mod: PBBFAC,,, | Performed by: INTERNAL MEDICINE

## 2024-09-18 RX ORDER — SACUBITRIL AND VALSARTAN 24; 26 MG/1; MG/1
1 TABLET, FILM COATED ORAL DAILY
Qty: 90 TABLET | Refills: 3 | Status: SHIPPED | OUTPATIENT
Start: 2024-09-18

## 2024-09-18 RX ORDER — METOPROLOL SUCCINATE 50 MG/1
TABLET, EXTENDED RELEASE ORAL
Qty: 90 TABLET | Refills: 3 | Status: SHIPPED | OUTPATIENT
Start: 2024-09-18

## 2024-09-18 RX ORDER — CLONIDINE HYDROCHLORIDE 0.2 MG/1
0.2 TABLET ORAL 2 TIMES DAILY
Qty: 180 TABLET | Refills: 3 | Status: SHIPPED | OUTPATIENT
Start: 2024-09-18

## 2024-09-18 NOTE — PROGRESS NOTES
Subjective   Patient ID:  Paola Wood is a 74 y.o. female who presents for follow-up of Follow-up (3 months)      HPI 74 yo WF with extensive cardiac hx recently in [place] and underwent procedures as described below. Has severe HLD but has been intolerant of statins Last visit was confused about her meds thus she brought all her bottles. She still has occasional CP easily relieved by NTG. DEnies SOB or edema.     ASSESSMENT AND PLAN: 4-       1. Angiographically severe coronary artery disease with heavy   calcification with occluded graft to the left coronaries and atretic left   internal mammary artery.      2. Patent graft to the RCA PDA with patent stent in the mid graft.      3. Normal left ventricular end-diastolic pressure.      4. Normal ejection fraction.      5. Successful high risk percutaneous coronary intervention with   reconstruction of the left system with multiple stents to left anterior   descending and left circumflex using mini-crush technique in OM1 and OM2   bifurcation and in the left main left   anterior descending, left circumflex bifurcation with good angiographic result      Past procedures  1. CAD s/p PTCA X 2 mRCA 8/10, 9/10; s/p CABG X 4v (LIMA-LAD-atretic, SVG-OM1/OM2, SVG-RCA) 2011; s/pPCI mLAD 3.0 X 12 mm Promus 4/13; s/p PCI SVG-PDA 3.5 X 15 mm [place] 1/3/2023       Summary  Show Result Comparison     Left Ventricle: The left ventricle is normal in size. There is concentric remodeling. There is normal systolic function. Ejection fraction by visual approximation is 70%. Grade II diastolic dysfunction.    Right Ventricle: Normal right ventricular cavity size. Systolic function is borderline low.    Left Atrium: Left atrium is moderately dilated.    Aortic Valve: Aortic valve peak velocity is 2.05 m/s. Mean gradient is 9 mmHg.    Mitral Valve: There is mild regurgitation with a centrally directed jet.    Tricuspid Valve: There is mild regurgitation.    Pulmonary  Artery: The estimated pulmonary artery     Review of Systems   Constitutional: Negative for decreased appetite, fever, malaise/fatigue, weight gain and weight loss.   HENT:  Negative for hearing loss and nosebleeds.    Eyes:  Negative for visual disturbance.   Cardiovascular:  Positive for chest pain. Negative for claudication, cyanosis, dyspnea on exertion, irregular heartbeat, leg swelling, near-syncope, orthopnea, palpitations, paroxysmal nocturnal dyspnea and syncope.   Respiratory:  Negative for cough, hemoptysis, shortness of breath, sleep disturbances due to breathing, snoring and wheezing.    Endocrine: Negative for cold intolerance, heat intolerance, polydipsia and polyuria.   Hematologic/Lymphatic: Negative for adenopathy and bleeding problem. Does not bruise/bleed easily.   Skin:  Negative for color change, itching, poor wound healing, rash and suspicious lesions.   Musculoskeletal:  Negative for arthritis, back pain, falls, joint pain, joint swelling, muscle cramps, muscle weakness and myalgias.   Gastrointestinal:  Negative for bloating, abdominal pain, change in bowel habit, constipation, flatus, heartburn, hematemesis, hematochezia, hemorrhoids, jaundice, melena, nausea and vomiting.   Genitourinary:  Negative for bladder incontinence, decreased libido, frequency, hematuria, hesitancy and urgency.   Neurological:  Negative for brief paralysis, difficulty with concentration, excessive daytime sleepiness, dizziness, focal weakness, headaches, light-headedness, loss of balance, numbness, vertigo and weakness.   Psychiatric/Behavioral:  Negative for altered mental status, depression and memory loss. The patient does not have insomnia and is not nervous/anxious.    Allergic/Immunologic: Negative for environmental allergies, hives and persistent infections.          Objective     Physical Exam  Vitals and nursing note reviewed.   Constitutional:       Appearance: She is well-developed.      Comments: BP  "118/60 (BP Location: Left arm, Patient Position: Sitting, BP Method: Medium (Manual))   Pulse (!) 50   Ht 5' 3.5" (1.613 m)   Wt 68 kg (150 lb)   BMI 26.15 kg/m²      HENT:      Head: Normocephalic and atraumatic.      Right Ear: External ear normal.      Left Ear: External ear normal.      Nose: Nose normal.   Eyes:      General: Lids are normal. No scleral icterus.        Right eye: No discharge.         Left eye: No discharge.      Conjunctiva/sclera: Conjunctivae normal.      Right eye: No hemorrhage.     Pupils: Pupils are equal, round, and reactive to light.   Neck:      Thyroid: No thyromegaly.      Vascular: No JVD.      Trachea: No tracheal deviation.   Cardiovascular:      Rate and Rhythm: Normal rate and regular rhythm.      Pulses: Intact distal pulses.      Heart sounds: Normal heart sounds. No murmur heard.     No friction rub. No gallop.   Pulmonary:      Effort: Pulmonary effort is normal. No respiratory distress.      Breath sounds: Normal breath sounds. No wheezing or rales.   Chest:      Chest wall: No tenderness.   Breasts:     Breasts are symmetrical.   Abdominal:      General: Bowel sounds are normal. There is no distension.      Palpations: Abdomen is soft. There is no hepatomegaly or mass.      Tenderness: There is no abdominal tenderness. There is no guarding or rebound.   Musculoskeletal:         General: No tenderness. Normal range of motion.      Cervical back: Normal range of motion and neck supple.   Lymphadenopathy:      Cervical: No cervical adenopathy.   Skin:     General: Skin is warm and dry.      Coloration: Skin is not pale.      Findings: No erythema or rash.   Neurological:      Mental Status: She is alert and oriented to person, place, and time.      Cranial Nerves: No cranial nerve deficit.      Coordination: Coordination normal.      Deep Tendon Reflexes: Reflexes are normal and symmetric. Reflexes normal.   Psychiatric:         Behavior: Behavior normal.         Thought " Content: Thought content normal.         Judgment: Judgment normal.            Assessment and Plan     1. Acute on chronic diastolic (congestive) heart failure    2. Chronic congestive heart failure, unspecified heart failure type    3. Athscl heart disease of native coronary artery w/o ang pctrs    4. Coronary artery disease of bypass graft of native heart with stable angina pectoris    5. Heart failure, unspecified HF chronicity, unspecified heart failure type    6. Hyperlipidemia, unspecified hyperlipidemia type    7. Primary hypertension    8. S/P PTCA (percutaneous transluminal coronary angioplasty)    9. Stage 3b chronic kidney disease    10. Statin intolerance        Plan:  Reviewed  meds and MAR is now correct  Cardiac status stable with stable angina   Continue current meds   Patient advised to modify risk factors such as weight, exercise, diet,  tobacco and alcohol exposure    No orders of the defined types were placed in this encounter.    Follow up in about 3 months (around 12/18/2024).     Advance Care Planning     Date: 09/18/2024

## 2024-10-21 ENCOUNTER — PATIENT OUTREACH (OUTPATIENT)
Dept: ADMINISTRATIVE | Facility: HOSPITAL | Age: 74
End: 2024-10-21
Payer: MEDICARE

## 2024-10-21 DIAGNOSIS — N18.32 STAGE 3B CHRONIC KIDNEY DISEASE: Primary | ICD-10-CM

## 2024-10-26 DIAGNOSIS — I50.9 CHRONIC CONGESTIVE HEART FAILURE, UNSPECIFIED HEART FAILURE TYPE: ICD-10-CM

## 2024-10-28 ENCOUNTER — LAB VISIT (OUTPATIENT)
Dept: LAB | Facility: HOSPITAL | Age: 74
End: 2024-10-28
Attending: INTERNAL MEDICINE
Payer: MEDICARE

## 2024-10-28 DIAGNOSIS — N18.32 STAGE 3B CHRONIC KIDNEY DISEASE: ICD-10-CM

## 2024-10-28 DIAGNOSIS — E78.01 FAMILIAL HYPERCHOLESTEROLEMIA: ICD-10-CM

## 2024-10-28 LAB
ALBUMIN SERPL BCP-MCNC: 3.7 G/DL (ref 3.5–5.2)
ALBUMIN/CREAT UR: 189.1 UG/MG (ref 0–30)
ALP SERPL-CCNC: 85 U/L (ref 40–150)
ALT SERPL W/O P-5'-P-CCNC: 10 U/L (ref 10–44)
ANION GAP SERPL CALC-SCNC: 11 MMOL/L (ref 8–16)
AST SERPL-CCNC: 16 U/L (ref 10–40)
BILIRUB SERPL-MCNC: 0.4 MG/DL (ref 0.1–1)
BUN SERPL-MCNC: 22 MG/DL (ref 8–23)
CALCIUM SERPL-MCNC: 9.4 MG/DL (ref 8.7–10.5)
CHLORIDE SERPL-SCNC: 106 MMOL/L (ref 95–110)
CHOLEST SERPL-MCNC: 278 MG/DL (ref 120–199)
CHOLEST/HDLC SERPL: 4.9 {RATIO} (ref 2–5)
CO2 SERPL-SCNC: 23 MMOL/L (ref 23–29)
CREAT SERPL-MCNC: 1.2 MG/DL (ref 0.5–1.4)
CREAT UR-MCNC: 128 MG/DL (ref 15–325)
EST. GFR  (NO RACE VARIABLE): 47.8 ML/MIN/1.73 M^2
GLUCOSE SERPL-MCNC: 100 MG/DL (ref 70–110)
HDLC SERPL-MCNC: 57 MG/DL (ref 40–75)
HDLC SERPL: 20.5 % (ref 20–50)
LDLC SERPL CALC-MCNC: 176.8 MG/DL (ref 63–159)
MICROALBUMIN UR DL<=1MG/L-MCNC: 242 UG/ML
NONHDLC SERPL-MCNC: 221 MG/DL
POTASSIUM SERPL-SCNC: 4.4 MMOL/L (ref 3.5–5.1)
PROT SERPL-MCNC: 7.5 G/DL (ref 6–8.4)
SODIUM SERPL-SCNC: 140 MMOL/L (ref 136–145)
TRIGL SERPL-MCNC: 221 MG/DL (ref 30–150)

## 2024-10-28 PROCEDURE — 36415 COLL VENOUS BLD VENIPUNCTURE: CPT | Mod: PO | Performed by: INTERNAL MEDICINE

## 2024-10-28 PROCEDURE — 82043 UR ALBUMIN QUANTITATIVE: CPT | Performed by: STUDENT IN AN ORGANIZED HEALTH CARE EDUCATION/TRAINING PROGRAM

## 2024-10-28 PROCEDURE — 80053 COMPREHEN METABOLIC PANEL: CPT | Performed by: INTERNAL MEDICINE

## 2024-10-28 PROCEDURE — 80061 LIPID PANEL: CPT | Performed by: INTERNAL MEDICINE

## 2024-10-28 PROCEDURE — 82570 ASSAY OF URINE CREATININE: CPT | Performed by: STUDENT IN AN ORGANIZED HEALTH CARE EDUCATION/TRAINING PROGRAM

## 2024-10-28 RX ORDER — RANOLAZINE 500 MG/1
500 TABLET, EXTENDED RELEASE ORAL 2 TIMES DAILY
Qty: 90 TABLET | Refills: 3 | Status: SHIPPED | OUTPATIENT
Start: 2024-10-28

## 2024-10-29 ENCOUNTER — TELEPHONE (OUTPATIENT)
Dept: CARDIOLOGY | Facility: CLINIC | Age: 74
End: 2024-10-29
Payer: MEDICARE

## 2024-10-31 ENCOUNTER — TELEPHONE (OUTPATIENT)
Dept: FAMILY MEDICINE | Facility: CLINIC | Age: 74
End: 2024-10-31
Payer: MEDICARE

## 2024-11-07 ENCOUNTER — OFFICE VISIT (OUTPATIENT)
Dept: FAMILY MEDICINE | Facility: CLINIC | Age: 74
End: 2024-11-07
Payer: MEDICARE

## 2024-11-07 ENCOUNTER — TELEPHONE (OUTPATIENT)
Dept: FAMILY MEDICINE | Facility: CLINIC | Age: 74
End: 2024-11-07

## 2024-11-07 VITALS
HEART RATE: 76 BPM | SYSTOLIC BLOOD PRESSURE: 139 MMHG | DIASTOLIC BLOOD PRESSURE: 72 MMHG | RESPIRATION RATE: 18 BRPM | HEIGHT: 63 IN | WEIGHT: 155.13 LBS | BODY MASS INDEX: 27.49 KG/M2 | TEMPERATURE: 99 F | OXYGEN SATURATION: 98 %

## 2024-11-07 DIAGNOSIS — I25.10 ATHSCL HEART DISEASE OF NATIVE CORONARY ARTERY W/O ANG PCTRS: ICD-10-CM

## 2024-11-07 DIAGNOSIS — Z12.31 ENCOUNTER FOR SCREENING MAMMOGRAM FOR MALIGNANT NEOPLASM OF BREAST: Primary | ICD-10-CM

## 2024-11-07 DIAGNOSIS — Z78.9 STATIN INTOLERANCE: ICD-10-CM

## 2024-11-07 DIAGNOSIS — K52.9 CHRONIC DIARRHEA: ICD-10-CM

## 2024-11-07 DIAGNOSIS — N18.32 STAGE 3B CHRONIC KIDNEY DISEASE: ICD-10-CM

## 2024-11-07 DIAGNOSIS — E78.5 HYPERLIPIDEMIA, UNSPECIFIED HYPERLIPIDEMIA TYPE: ICD-10-CM

## 2024-11-07 PROCEDURE — 99215 OFFICE O/P EST HI 40 MIN: CPT | Mod: PBBFAC,PO | Performed by: STUDENT IN AN ORGANIZED HEALTH CARE EDUCATION/TRAINING PROGRAM

## 2024-11-07 PROCEDURE — 99999 PR PBB SHADOW E&M-EST. PATIENT-LVL V: CPT | Mod: PBBFAC,,, | Performed by: STUDENT IN AN ORGANIZED HEALTH CARE EDUCATION/TRAINING PROGRAM

## 2024-11-08 NOTE — PROGRESS NOTES
Problem List Items Addressed This Visit          Cardiac/Vascular    Hyperlipidemia    Overview     Statin intolerance on repatha  Follows with cards          Athscl heart disease of native coronary artery w/o ang pctrs    Overview     Chronic history; stable  Continue Plavix  Referral to heart failure clinic  GDMT: bb, ENTRESTO, IMDUR            Renal/    Stage 3b chronic kidney disease    Overview       Lab Results   Component Value Date     10/28/2024    K 4.4 10/28/2024     10/28/2024    CO2 23 10/28/2024    BUN 22 10/28/2024    CREATININE 1.2 10/28/2024    CALCIUM 9.4 10/28/2024    ANIONGAP 11 10/28/2024    EGFRNORACEVR 47.8 (A) 10/28/2024     Chronic; stable   Avoid nephrotoxic agents   Referral to nephrology            Relevant Orders    Ambulatory referral/consult to Nephrology       Other    Statin intolerance    Overview     -chronic condition. Currently stable.      Hyperlipidemia Medications               evolocumab (REPATHA SURECLICK) 140 mg/mL PnIj Inject 1 mL (140 mg total) into the skin every 14 (fourteen) days.              Lab Results   Component Value Date    CHOL 278 (H) 10/28/2024    CHOL 255 (H) 07/29/2024    CHOL 363 (H) 04/15/2024     Lab Results   Component Value Date    HDL 57 10/28/2024    HDL 54 07/29/2024    HDL 46 04/15/2024     Lab Results   Component Value Date    LDLCALC 176.8 (H) 10/28/2024    LDLCALC 180.6 (H) 07/29/2024    LDLCALC 286 (H) 04/15/2024     Lab Results   Component Value Date    TRIG 221 (H) 10/28/2024    TRIG 102 07/29/2024    TRIG 154 04/15/2024     Lab Results   Component Value Date    CHOLHDL 20.5 10/28/2024    CHOLHDL 21.2 07/29/2024    CHOLHDL 7.9 (H) 04/15/2024          The 10-year ASCVD risk score (Daniel JUNE, et al., 2019) is: 21%    Values used to calculate the score:      Age: 74 years      Sex: Female      Is Non- : No      Diabetic: No      Tobacco smoker: No      Systolic Blood Pressure: 139 mmHg      Is BP treated:  Yes      HDL Cholesterol: 57 mg/dL      Total Cholesterol: 278 mg/dL            Other Visit Diagnoses       Encounter for screening mammogram for malignant neoplasm of breast    -  Primary    Relevant Orders    Mammo Digital Screening Bilat w/ Marciano    Chronic diarrhea        Relevant Orders    Occult blood x 1, stool    Stool Exam-Ova,Cysts,Parasites    Giardia / Cryptosporidum, EIA    WBC, Stool    Stool culture             Assessment & Plan    HYPERLIPIDEMIA:  - Monitoring cholesterol levels; considering increasing Repatha dose if levels remain elevated after dietary changes.  - Repatha 140mg injection every 2 weeks for cholesterol management.  - Lipid panel ordered to be completed in 3 months.  - Follow up in 3 months for repeat lipid panel.  Routine cards follow up    KIDNEY FUNCTION:  - Assessing kidney function; creatinine levels fluctuating but showing overall improvement since hospitalization.  - Increase water intake to support kidney function.  - Referred to Dr. Carrillo, nephrologist, for kidney function evaluation.    DIASTOLIC DYSFUNCTION:  - Evaluating diastolic dysfunction; current treatment appears appropriate given EF of 70%.    DIARRHEA:  - Considering stool studies to rule out bowel infection as cause of diarrhea.  - Stool studies ordered to check for bowel infection.  - Contact the office if diarrhea persists or worsens.    SLEEP ISSUES:  - Discussed importance of exercise for improving sleep quality and overall health.    PAIN MANAGEMENT:  - Continued oxycodone (Percocet) 10mg as needed for pain.    WEIGHT MANAGEMENT:  - Patient to initiate regular exercise routine, such as walking or using stationary pedals at home, to improve sleep and overall health.    MAMMOGRAM:  - Mammogram ordered.    FOLLOW UP:  - Follow up in 6 months.            Grace Duron MD  _________________________________________________________________________      Patient ID: Paola Wood is a 74 y.o.  female.    History of Present Illness    CHIEF COMPLAINT:  Patient presents today for follow-up.    SLEEP:  She reports sleeping about five nights out of seven. She had a great sleep the previous night. She stopped taking previously prescribed sleep medication after finishing the initial bottle.    BACK PAIN:  She reports manageable back pain. She had injections twice in October for her back. She takes pain medication only when her back hurts, which is not frequent. Back pain limits her mobility in stores, requiring her to use a shopping cart for support. She denies interest in physical therapy, stating she has had it before.    GASTROINTESTINAL ISSUES:  She experiences diarrhea with unpredictable timing, leading to episodes of incontinence. She denies any specific food triggers, upset stomach, or bloody stools. She is aware that she is due for a colonoscopy, which was previously delayed due to cardiac concerns.    MEDICATIONS:  She is taking Repatha injections every two weeks for cholesterol management, Metoprolol, and Ranexa as prescribed. She has Oxycodone (Percocet) 10mg on hand but does not need a refill at this time. She is not taking Jardiance or potassium supplements.    CHOLESTEROL:  Her cholesterol has increased from 255 to 278 since July. Her triglycerides have risen from 102 to 220 in the same period. She recently reduced her cheese consumption, which she had been eating frequently as a protein source after stopping meat consumption.    KIDNEY FUNCTION:  Her kidney function has been fluctuating. It improved after a recent hospitalization, reaching near-normal levels of around 55 in July. However, the most recent test last week showed a decline, with values back in the 40s. She expresses concern about this recent decline in kidney function.    FAMILY HISTORY:  She has three children and five grandchildren.          Past medical histories reviewed, including past medical, surgical, family and social  histories.      Current Outpatient Medications on File Prior to Visit   Medication Sig Dispense Refill    aspirin (ECOTRIN) 81 MG EC tablet Take 81 mg by mouth once daily.      cloNIDine (CATAPRES) 0.2 MG tablet Take 1 tablet (0.2 mg total) by mouth 2 (two) times daily. 180 tablet 3    clopidogreL (PLAVIX) 75 mg tablet Take 1 tablet (75 mg total) by mouth once daily. 90 tablet 3    evolocumab (REPATHA SURECLICK) 140 mg/mL PnIj Inject 1 mL (140 mg total) into the skin every 14 (fourteen) days. 6 each 3    metoprolol succinate (TOPROL-XL) 50 MG 24 hr tablet One half tablet twice a day 90 tablet 3    nitroGLYCERIN (NITROSTAT) 0.4 MG SL tablet Place 1 tablet (0.4 mg total) under the tongue every 5 (five) minutes as needed for Chest pain. use as directed 25 tablet 0    oxyCODONE-acetaminophen (PERCOCET)  mg per tablet Take 1 tablet by mouth every 4 (four) hours as needed.      pantoprazole (PROTONIX) 40 MG tablet Take 1 tablet (40 mg total) by mouth once daily. PROTONIX 40 MG TBEC 90 tablet 3    ranolazine (RANEXA) 500 MG Tb12 TAKE 1 TABLET BY MOUTH TWICE DAILY 90 tablet 3    sacubitriL-valsartan (ENTRESTO) 24-26 mg per tablet Take 1 tablet by mouth Daily. 90 tablet 3    [DISCONTINUED] ALPRAZolam (XANAX) 0.25 MG tablet Take 1 tablet (0.25 mg total) by mouth nightly as needed for Anxiety. 90 tablet 0    [DISCONTINUED] empagliflozin (JARDIANCE) 10 mg tablet Take 10 mg by mouth once daily.      [DISCONTINUED] potassium chloride SA (K-DUR,KLOR-CON) 20 MEQ tablet Take 1 tablet (20 mEq total) by mouth once daily. 90 tablet 3     No current facility-administered medications on file prior to visit.       Review of Systems   12 point review of systems negative except for listed in HPI.     Objective:    Nursing note and vitals reviewed.  Vitals:    11/07/24 1240   BP: 139/72   Pulse: 76   Resp: 18   Temp: 98.7 °F (37.1 °C)     Body mass index is 27.47 kg/m².     Physical Exam   Constitutional: oriented to person, place, and  time. well-developed and well-nourished. No distress.   HENT: WNL  Head: Normocephalic and atraumatic.   Eyes: EOM are normal.   Neck: Normal range of motion. Neck supple.   Cardiovascular: Normal rate  Pulmonary/Chest: Effort normal. No respiratory distress.   GI: soft, non distended, no ttp, no rebound/guarding  Musculoskeletal: Normal range of motion. no edema.   Neurological: CN II-XII intact  Skin: warm and dry.   Psychiatric: normal mood and affect. behavior is normal.                 We Offer Telehealth & Same Day Appointments!   Book your Telehealth appointment with me through my nurse or   Clinic appointments on [employer]!  [phone]     To Schedule appointments online, go to [employer]: [email or web address]       Visit today included increased complexity associated with the care of the episodic problem addressed and managing the longitudinal care of the patient due to the serious and/or complex managed problem(s) as per problem list.

## 2024-11-12 ENCOUNTER — TELEPHONE (OUTPATIENT)
Dept: CARDIOLOGY | Facility: CLINIC | Age: 74
End: 2024-11-12
Payer: MEDICARE

## 2024-11-12 NOTE — TELEPHONE ENCOUNTER
Dr. Rock is requesting clearance for patient  Procedure: Colonoscopy   Date of procedure: TBD  Please include instructions on length of time patient can hold Plavix prior to the procedure.

## 2024-11-25 ENCOUNTER — HOSPITAL ENCOUNTER (OUTPATIENT)
Dept: RADIOLOGY | Facility: HOSPITAL | Age: 74
Discharge: HOME | End: 2024-11-25
Attending: STUDENT IN AN ORGANIZED HEALTH CARE EDUCATION/TRAINING PROGRAM
Payer: MEDICARE

## 2024-11-25 DIAGNOSIS — Z12.31 ENCOUNTER FOR SCREENING MAMMOGRAM FOR MALIGNANT NEOPLASM OF BREAST: ICD-10-CM

## 2024-11-25 PROCEDURE — 77063 BREAST TOMOSYNTHESIS BI: CPT | Mod: 26,,, | Performed by: RADIOLOGY

## 2024-11-25 PROCEDURE — 77063 BREAST TOMOSYNTHESIS BI: CPT | Mod: TC,PO

## 2024-11-25 PROCEDURE — 77067 SCR MAMMO BI INCL CAD: CPT | Mod: 26,,, | Performed by: RADIOLOGY

## 2024-12-18 ENCOUNTER — OFFICE VISIT (OUTPATIENT)
Dept: CARDIOLOGY | Facility: CLINIC | Age: 74
End: 2024-12-18
Payer: MEDICARE

## 2024-12-18 VITALS
HEIGHT: 63 IN | OXYGEN SATURATION: 99 % | SYSTOLIC BLOOD PRESSURE: 180 MMHG | HEART RATE: 64 BPM | BODY MASS INDEX: 27.84 KG/M2 | DIASTOLIC BLOOD PRESSURE: 74 MMHG | WEIGHT: 157.13 LBS

## 2024-12-18 DIAGNOSIS — I10 PRIMARY HYPERTENSION: ICD-10-CM

## 2024-12-18 DIAGNOSIS — I25.708 CORONARY ARTERY DISEASE OF BYPASS GRAFT OF NATIVE HEART WITH STABLE ANGINA PECTORIS: Primary | ICD-10-CM

## 2024-12-18 DIAGNOSIS — E78.5 HYPERLIPIDEMIA, UNSPECIFIED HYPERLIPIDEMIA TYPE: ICD-10-CM

## 2024-12-18 DIAGNOSIS — I25.10 ATHSCL HEART DISEASE OF NATIVE CORONARY ARTERY W/O ANG PCTRS: ICD-10-CM

## 2024-12-18 DIAGNOSIS — Z98.61 S/P PTCA (PERCUTANEOUS TRANSLUMINAL CORONARY ANGIOPLASTY): ICD-10-CM

## 2024-12-18 DIAGNOSIS — Z95.5 STENTED CORONARY ARTERY: ICD-10-CM

## 2024-12-18 DIAGNOSIS — I50.9 CHRONIC CONGESTIVE HEART FAILURE, UNSPECIFIED HEART FAILURE TYPE: ICD-10-CM

## 2024-12-18 DIAGNOSIS — Z78.9 STATIN INTOLERANCE: ICD-10-CM

## 2024-12-18 DIAGNOSIS — Z95.1 S/P CABG (CORONARY ARTERY BYPASS GRAFT): ICD-10-CM

## 2024-12-18 PROCEDURE — 99213 OFFICE O/P EST LOW 20 MIN: CPT | Mod: PBBFAC,PO | Performed by: INTERNAL MEDICINE

## 2024-12-18 PROCEDURE — 99999 PR PBB SHADOW E&M-EST. PATIENT-LVL III: CPT | Mod: PBBFAC,,, | Performed by: INTERNAL MEDICINE

## 2024-12-18 PROCEDURE — 99214 OFFICE O/P EST MOD 30 MIN: CPT | Mod: S$PBB,,, | Performed by: INTERNAL MEDICINE

## 2024-12-18 RX ORDER — RANOLAZINE 500 MG/1
500 TABLET, EXTENDED RELEASE ORAL 2 TIMES DAILY
Qty: 90 TABLET | Refills: 3 | Status: CANCELLED | OUTPATIENT
Start: 2024-12-18

## 2024-12-18 RX ORDER — CLONIDINE HYDROCHLORIDE 0.2 MG/1
0.2 TABLET ORAL 2 TIMES DAILY
Qty: 180 TABLET | Refills: 3 | Status: SHIPPED | OUTPATIENT
Start: 2024-12-18

## 2024-12-18 RX ORDER — SACUBITRIL AND VALSARTAN 24; 26 MG/1; MG/1
1 TABLET, FILM COATED ORAL DAILY
Qty: 90 TABLET | Refills: 3 | Status: ACTIVE | OUTPATIENT
Start: 2024-12-18

## 2024-12-18 RX ORDER — CLONIDINE HYDROCHLORIDE 0.2 MG/1
0.2 TABLET ORAL 2 TIMES DAILY
Qty: 180 TABLET | Refills: 3 | Status: CANCELLED | OUTPATIENT
Start: 2024-12-18

## 2024-12-18 RX ORDER — METOPROLOL SUCCINATE 50 MG/1
TABLET, EXTENDED RELEASE ORAL
Qty: 90 TABLET | Refills: 3 | Status: CANCELLED | OUTPATIENT
Start: 2024-12-18

## 2024-12-18 RX ORDER — SACUBITRIL AND VALSARTAN 24; 26 MG/1; MG/1
1 TABLET, FILM COATED ORAL DAILY
Qty: 90 TABLET | Refills: 3 | Status: CANCELLED | OUTPATIENT
Start: 2024-12-18

## 2024-12-18 RX ORDER — CLOPIDOGREL BISULFATE 75 MG/1
75 TABLET ORAL DAILY
Qty: 90 TABLET | Refills: 3 | Status: SHIPPED | OUTPATIENT
Start: 2024-12-18

## 2024-12-18 RX ORDER — LABETALOL 200 MG/1
200 TABLET, FILM COATED ORAL 2 TIMES DAILY
Qty: 60 TABLET | Refills: 11 | Status: ACTIVE | OUTPATIENT
Start: 2024-12-18 | End: 2025-12-18

## 2024-12-18 NOTE — PROGRESS NOTES
Subjective   Patient ID:  Paola Wood is a 74 y.o. female who presents for follow-up of Follow-up      HPI74 yo WF with extensive cardiac hx and underwent procedures as described below. Has severe HLD but has been intolerant of statins She is difficult to manage because she list allergies to almost everything but is tolerating meds that she states she is allergic to. BP elevated no CP. Last echo LV function normal      ASSESSMENT AND PLAN: 4-       1. Angiographically severe coronary artery disease with heavy   calcification with occluded graft to the left coronaries and atretic left   internal mammary artery.      2. Patent graft to the RCA PDA with patent stent in the mid graft.      3. Normal left ventricular end-diastolic pressure.      4. Normal ejection fraction.      5. Successful high risk percutaneous coronary intervention with   reconstruction of the left system with multiple stents to left anterior   descending and left circumflex using mini-crush technique in OM1 and OM2   bifurcation and in the left main left   anterior descending, left circumflex bifurcation with good angiographic result      Past procedures  1. CAD s/p PTCA X 2 mRCA 8/10, 9/10; s/p CABG X 4v (LIMA-LAD-atretic, SVG-OM1/OM2, SVG-RCA) 2011; s/pPCI mLAD 3.0 X 12 mm [place] 4/13; s/p PCI SVG-PDA 3.5 X 15 mm [place] 1/3/2023       Summary  Show Result Comparison     Left Ventricle: The left ventricle is normal in size. There is concentric remodeling. There is normal systolic function. Ejection fraction by visual approximation is 70%. Grade II diastolic dysfunction.    Right Ventricle: Normal right ventricular cavity size. Systolic function is borderline low.    Left Atrium: Left atrium is moderately dilated.    Aortic Valve: Aortic valve peak velocity is 2.05 m/s. Mean gradient is 9 mmHg.    Mitral Valve: There is mild regurgitation with a centrally directed jet.    Tricuspid Valve: There is mild regurgitation.    Pulmonary  Artery: The estimated pulmonary artery   Review of Systems   Constitutional: Negative for decreased appetite, fever, malaise/fatigue, weight gain and weight loss.   HENT:  Negative for hearing loss and nosebleeds.    Eyes:  Negative for visual disturbance.   Cardiovascular:  Negative for chest pain, claudication, cyanosis, dyspnea on exertion, irregular heartbeat, leg swelling, near-syncope, orthopnea, palpitations, paroxysmal nocturnal dyspnea and syncope.   Respiratory:  Negative for cough, hemoptysis, shortness of breath, sleep disturbances due to breathing, snoring and wheezing.    Endocrine: Negative for cold intolerance, heat intolerance, polydipsia and polyuria.   Hematologic/Lymphatic: Negative for adenopathy and bleeding problem. Does not bruise/bleed easily.   Skin:  Negative for color change, itching, poor wound healing, rash and suspicious lesions.   Musculoskeletal:  Negative for arthritis, back pain, falls, joint pain, joint swelling, muscle cramps, muscle weakness and myalgias.   Gastrointestinal:  Negative for bloating, abdominal pain, change in bowel habit, constipation, flatus, heartburn, hematemesis, hematochezia, hemorrhoids, jaundice, melena, nausea and vomiting.   Genitourinary:  Negative for bladder incontinence, decreased libido, frequency, hematuria, hesitancy and urgency.   Neurological:  Negative for brief paralysis, difficulty with concentration, excessive daytime sleepiness, dizziness, focal weakness, headaches, light-headedness, loss of balance, numbness, vertigo and weakness.   Psychiatric/Behavioral:  Positive for memory loss. Negative for altered mental status and depression. The patient does not have insomnia and is not nervous/anxious.    Allergic/Immunologic: Negative for environmental allergies, hives and persistent infections.          Objective     Physical Exam  Vitals and nursing note reviewed.   Constitutional:       Appearance: She is well-developed.      Comments: BP (!)  "180/74   Pulse 64   Ht 5' 3" (1.6 m)   Wt 71.3 kg (157 lb 1.6 oz)   SpO2 99%   BMI 27.83 kg/m²      HENT:      Head: Normocephalic and atraumatic.      Right Ear: External ear normal.      Left Ear: External ear normal.      Nose: Nose normal.   Eyes:      General: Lids are normal. No scleral icterus.        Right eye: No discharge.         Left eye: No discharge.      Conjunctiva/sclera: Conjunctivae normal.      Right eye: No hemorrhage.     Pupils: Pupils are equal, round, and reactive to light.   Neck:      Thyroid: No thyromegaly.      Vascular: No JVD.      Trachea: No tracheal deviation.   Cardiovascular:      Rate and Rhythm: Normal rate and regular rhythm.      Pulses: Intact distal pulses.      Heart sounds: Normal heart sounds. No murmur heard.     No friction rub. No gallop.   Pulmonary:      Effort: Pulmonary effort is normal. No respiratory distress.      Breath sounds: Normal breath sounds. No wheezing or rales.   Chest:      Chest wall: No tenderness.   Breasts:     Breasts are symmetrical.   Abdominal:      General: Bowel sounds are normal. There is no distension.      Palpations: Abdomen is soft. There is no hepatomegaly or mass.      Tenderness: There is no abdominal tenderness. There is no guarding or rebound.   Musculoskeletal:         General: No tenderness. Normal range of motion.      Cervical back: Normal range of motion and neck supple.   Lymphadenopathy:      Cervical: No cervical adenopathy.   Skin:     General: Skin is warm and dry.      Coloration: Skin is not pale.      Findings: No erythema or rash.   Neurological:      Mental Status: She is alert and oriented to person, place, and time.      Cranial Nerves: No cranial nerve deficit.      Coordination: Coordination normal.      Deep Tendon Reflexes: Reflexes are normal and symmetric. Reflexes normal.   Psychiatric:         Behavior: Behavior normal.         Thought Content: Thought content normal.         Judgment: Judgment " normal.            Assessment and Plan     1. Coronary artery disease of bypass graft of native heart with stable angina pectoris    2. Athscl heart disease of native coronary artery w/o ang pctrs    3. Hyperlipidemia, unspecified hyperlipidemia type    4. Statin intolerance    5. Chronic congestive heart failure, unspecified heart failure type    6. Primary hypertension    7. S/P CABG (coronary artery bypass graft)    8. S/P PTCA (percutaneous transluminal coronary angioplasty)    9. Stented coronary artery        Plan:  Change metoprolol to to labetalol for better BP control  Repeat lipis. She states she uses the repatha but last lipid showed no change at all    Continue other meds     Orders Placed This Encounter   Procedures    Lipid Panel     Follow up in about 6 months (around 6/18/2025).     Advance Care Planning     Date: 12/18/2024

## 2024-12-19 ENCOUNTER — IMMUNIZATION (OUTPATIENT)
Dept: FAMILY MEDICINE | Facility: CLINIC | Age: 74
End: 2024-12-19
Payer: MEDICARE

## 2024-12-19 ENCOUNTER — TELEPHONE (OUTPATIENT)
Dept: CARDIOLOGY | Facility: CLINIC | Age: 74
End: 2024-12-19
Payer: MEDICARE

## 2024-12-19 ENCOUNTER — LAB VISIT (OUTPATIENT)
Dept: LAB | Facility: HOSPITAL | Age: 74
End: 2024-12-19
Attending: INTERNAL MEDICINE
Payer: MEDICARE

## 2024-12-19 DIAGNOSIS — E78.5 HYPERLIPIDEMIA, UNSPECIFIED HYPERLIPIDEMIA TYPE: ICD-10-CM

## 2024-12-19 DIAGNOSIS — Z78.9 STATIN INTOLERANCE: ICD-10-CM

## 2024-12-19 DIAGNOSIS — Z23 NEED FOR VACCINATION: Primary | ICD-10-CM

## 2024-12-19 LAB
CHOLEST SERPL-MCNC: 278 MG/DL (ref 120–199)
CHOLEST/HDLC SERPL: 5.1 {RATIO} (ref 2–5)
HDLC SERPL-MCNC: 54 MG/DL (ref 40–75)
HDLC SERPL: 19.4 % (ref 20–50)
LDLC SERPL CALC-MCNC: 196.8 MG/DL (ref 63–159)
NONHDLC SERPL-MCNC: 224 MG/DL
TRIGL SERPL-MCNC: 136 MG/DL (ref 30–150)

## 2024-12-19 PROCEDURE — 90653 IIV ADJUVANT VACCINE IM: CPT | Mod: PBBFAC,PO

## 2024-12-19 PROCEDURE — 80061 LIPID PANEL: CPT | Performed by: INTERNAL MEDICINE

## 2024-12-19 PROCEDURE — 99999PBSHW FLU VACCINE - ADJUVANTED: Mod: PBBFAC,,,

## 2024-12-19 PROCEDURE — 36415 COLL VENOUS BLD VENIPUNCTURE: CPT | Mod: PO | Performed by: INTERNAL MEDICINE

## 2024-12-20 NOTE — TELEPHONE ENCOUNTER
See if there is a special lipid clinic we can refer her to . No response to repatha and multiple allergies

## 2024-12-24 DIAGNOSIS — I25.10 ATHSCL HEART DISEASE OF NATIVE CORONARY ARTERY W/O ANG PCTRS: ICD-10-CM

## 2024-12-24 DIAGNOSIS — Z78.9 STATIN INTOLERANCE: ICD-10-CM

## 2024-12-24 DIAGNOSIS — E78.5 HYPERLIPIDEMIA, UNSPECIFIED HYPERLIPIDEMIA TYPE: ICD-10-CM

## 2024-12-24 RX ORDER — EVOLOCUMAB 140 MG/ML
140 INJECTION, SOLUTION SUBCUTANEOUS
Qty: 6 EACH | Refills: 3 | Status: ACTIVE | OUTPATIENT
Start: 2024-12-24

## 2025-01-05 PROBLEM — E78.01 FAMILIAL HYPERCHOLESTEROLEMIA: Status: ACTIVE | Noted: 2025-01-05

## 2025-01-05 NOTE — PROGRESS NOTES
"Subjective:   Patient ID:  Paola Wood is a 74 y.o. female who presents for evaluation of Dyslipidemia    HPI:  The patient is here for CAD.Several providers have mentioned non-compliance with regimen. PD last note says"75 yo WF with extensive cardiac hx and underwent procedures as described below. Has severe HLD but has been intolerant of statins She is difficult to manage because she list allergies to almost everything but is tolerating meds that she states she is allergic to. BP elevated no CP. Last echo LV function normal"     The patient has no chest pain, TIA, palpitations, syncope or pre-syncope.Patient does not exercise.She has a lot of SOB on exertion. Pulse slow but taking both metoprolol and I/4 labetalol twice. BP s mostly 150s  ER with one Ezetimide pill.        Review of Systems   Constitutional: Negative for chills, decreased appetite, diaphoresis, fever, malaise/fatigue, night sweats, weight gain and weight loss.   HENT:  Negative for congestion, hoarse voice, nosebleeds, sore throat and tinnitus.    Eyes:  Negative for blurred vision, double vision, vision loss in left eye, vision loss in right eye, visual disturbance and visual halos.   Cardiovascular:  Negative for chest pain, claudication, cyanosis, dyspnea on exertion, irregular heartbeat, leg swelling, near-syncope, orthopnea, palpitations, paroxysmal nocturnal dyspnea and syncope.   Respiratory:  Negative for cough, hemoptysis, shortness of breath, sleep disturbances due to breathing, snoring, sputum production and wheezing.    Endocrine: Negative for cold intolerance, heat intolerance, polydipsia, polyphagia and polyuria.   Hematologic/Lymphatic: Negative for adenopathy and bleeding problem. Does not bruise/bleed easily.   Skin:  Negative for color change, dry skin, flushing, itching, nail changes, poor wound healing, rash, skin cancer, suspicious lesions and unusual hair distribution.   Musculoskeletal:  Negative for arthritis, " "back pain, falls, gout, joint pain, joint swelling, muscle cramps, muscle weakness, myalgias and stiffness.   Gastrointestinal:  Negative for abdominal pain, anorexia, change in bowel habit, constipation, diarrhea, dysphagia, heartburn, hematemesis, hematochezia, melena and vomiting.   Genitourinary:  Negative for decreased libido, dysuria, hematuria, hesitancy and urgency.   Neurological:  Negative for excessive daytime sleepiness, dizziness, focal weakness, headaches, light-headedness, loss of balance, numbness, paresthesias, seizures, sensory change, tremors, vertigo and weakness.   Psychiatric/Behavioral:  Negative for altered mental status, depression, hallucinations, memory loss, substance abuse and suicidal ideas. The patient does not have insomnia and is not nervous/anxious.    Allergic/Immunologic: Negative for environmental allergies and hives.       Objective: BP (!) 152/67 (BP Location: Left arm, Patient Position: Sitting)   Pulse (!) 48   Ht 5' 3" (1.6 m)   Wt 71 kg (156 lb 8.4 oz)   SpO2 98%   BMI 27.73 kg/m²   Pulse high 40s low 50s by me   Physical Exam  Constitutional:       Appearance: She is well-developed.   HENT:      Head: Normocephalic.   Eyes:      Pupils: Pupils are equal, round, and reactive to light.   Neck:      Thyroid: No thyromegaly.      Vascular: Normal carotid pulses. No carotid bruit, hepatojugular reflux or JVD.   Cardiovascular:      Rate and Rhythm: Normal rate and regular rhythm.      Pulses: Intact distal pulses.      Heart sounds: Normal heart sounds. No murmur heard.     No friction rub. No gallop.   Pulmonary:      Effort: Pulmonary effort is normal. No tachypnea or respiratory distress.      Breath sounds: Normal breath sounds. No wheezing or rales.   Chest:      Chest wall: No tenderness.   Abdominal:      General: Bowel sounds are normal. There is no distension.      Palpations: Abdomen is soft. There is no mass.      Tenderness: There is no abdominal tenderness. " There is no guarding or rebound.   Musculoskeletal:         General: No tenderness. Normal range of motion.      Cervical back: Normal range of motion.   Lymphadenopathy:      Cervical: No cervical adenopathy.   Skin:     General: Skin is warm.      Findings: No erythema or rash.   Neurological:      Mental Status: She is alert and oriented to person, place, and time.      Cranial Nerves: No cranial nerve deficit.      Coordination: Coordination normal.   Psychiatric:         Behavior: Behavior normal.         Thought Content: Thought content normal.         Judgment: Judgment normal.         Assessment:     1. Coronary artery disease of bypass graft of native heart with stable angina pectoris    2. Chronic congestive heart failure, unspecified heart failure type    3. Hyperlipidemia, unspecified hyperlipidemia type    4. Primary hypertension    5. Statin intolerance    6. Recurrent falls    7. Generalized anxiety disorder    8. S/P CABG (coronary artery bypass graft)    9. S/P PTCA (percutaneous transluminal coronary angioplasty)    10. Memory loss    11. Athscl heart disease of native coronary artery w/o ang pctrs    12. Hypertensive heart disease with heart failure    13. Long term (current) use of antithrombotics/antiplatelets    14. Stenosis of both vertebral arteries    15. Acute on chronic diastolic (congestive) heart failure    16. Chronic kidney disease, stage 3a    17. Familial hypercholesterolemia      Last LVEF 70 ; Last LDLin 190s; Reviewed other providers detailed notes  Plan:   Discussed diet , achieving and maintaining ideal body weight, and exercise.   We reviewed meds in detail.  Reassured-Discussed goals, options, plan.  Omega-3 > 800 mg/d combined EPA/DHA.  Goal BP< 130/80.  Goal LDL < 55 but anything we can get could improve risk  Could do co Q 10 200 mg/d for a few weeks before trying very low dose statin -Prava 40 at night  Later could increase Enrtresto or Diltiazem  Diltiazem XR start with  once but later could increase   Co Q 10 200 mg/d and after 2 weeks try Prava 40 at night  Bempedoic Acid Nexletol 180 mg daily  Stop Metoprolol and if pulse > 60 can double the labetalol  NTG should be refilled every 8-9 months.      Paola was seen today for hyperlipidemia and shortness of breath.    Diagnoses and all orders for this visit:    Coronary artery disease of bypass graft of native heart with stable angina pectoris  -     Lipid Panel; Future  -     Comprehensive Metabolic Panel; Future  -     pravastatin (PRAVACHOL) 40 MG tablet; Take 1 tablet (40 mg total) by mouth once daily.  -     diltiaZEM (DILACOR XR) 180 MG CDCR; Take 1 capsule (180 mg total) by mouth 2 (two) times a day.  -     Lipoprotein A (LPA); Future  -     BNP; Future    Chronic congestive heart failure, unspecified heart failure type  -     diltiaZEM (DILACOR XR) 180 MG CDCR; Take 1 capsule (180 mg total) by mouth 2 (two) times a day.  -     BNP; Future    Hyperlipidemia, unspecified hyperlipidemia type  -     Comprehensive Metabolic Panel; Future  -     pravastatin (PRAVACHOL) 40 MG tablet; Take 1 tablet (40 mg total) by mouth once daily.  -     Lipoprotein A (LPA); Future    Primary hypertension  -     diltiaZEM (DILACOR XR) 180 MG CDCR; Take 1 capsule (180 mg total) by mouth 2 (two) times a day.    Statin intolerance  -     Lipid Panel; Future  -     Comprehensive Metabolic Panel; Future  -     pravastatin (PRAVACHOL) 40 MG tablet; Take 1 tablet (40 mg total) by mouth once daily.  -     CK; Future    Recurrent falls    Generalized anxiety disorder    S/P CABG (coronary artery bypass graft)    S/P PTCA (percutaneous transluminal coronary angioplasty)    Memory loss    Athscl heart disease of native coronary artery w/o ang pctrs    Hypertensive heart disease with heart failure    Long term (current) use of antithrombotics/antiplatelets    Stenosis of both vertebral arteries    Acute on chronic diastolic (congestive) heart  failure    Chronic kidney disease, stage 3a  -     Comprehensive Metabolic Panel; Future    Familial hypercholesterolemia    Other orders  -     bempedoic acid (NEXLETOL) 180 mg Tab; Take 1 tablet (180 mg total) by mouth Daily.            Follow up for Labs 14 weeks.

## 2025-01-06 ENCOUNTER — TELEPHONE (OUTPATIENT)
Dept: CARDIOLOGY | Facility: CLINIC | Age: 75
End: 2025-01-06
Payer: MEDICARE

## 2025-01-06 NOTE — TELEPHONE ENCOUNTER
Followed up with Paola, patient has been notified of this information and all questions answered. Per patient's provider:  She can try cutting the labetalol to 100mg twice a day. Patient verbalized understanding.

## 2025-01-06 NOTE — TELEPHONE ENCOUNTER
She can try cutting the labetalol to 100mg twice a day. If she cannot tolerate that I have no other recommendations since she has tried all classes of BP meds and intolerant to almost everything

## 2025-01-07 ENCOUNTER — OFFICE VISIT (OUTPATIENT)
Dept: CARDIOLOGY | Facility: CLINIC | Age: 75
End: 2025-01-07
Payer: MEDICARE

## 2025-01-07 VITALS
BODY MASS INDEX: 27.73 KG/M2 | WEIGHT: 156.5 LBS | SYSTOLIC BLOOD PRESSURE: 152 MMHG | HEART RATE: 48 BPM | OXYGEN SATURATION: 98 % | DIASTOLIC BLOOD PRESSURE: 67 MMHG | HEIGHT: 63 IN

## 2025-01-07 DIAGNOSIS — I11.0 HYPERTENSIVE HEART DISEASE WITH HEART FAILURE: ICD-10-CM

## 2025-01-07 DIAGNOSIS — I50.33 ACUTE ON CHRONIC DIASTOLIC (CONGESTIVE) HEART FAILURE: ICD-10-CM

## 2025-01-07 DIAGNOSIS — E78.5 HYPERLIPIDEMIA, UNSPECIFIED HYPERLIPIDEMIA TYPE: ICD-10-CM

## 2025-01-07 DIAGNOSIS — Z98.61 S/P PTCA (PERCUTANEOUS TRANSLUMINAL CORONARY ANGIOPLASTY): ICD-10-CM

## 2025-01-07 DIAGNOSIS — I65.03 STENOSIS OF BOTH VERTEBRAL ARTERIES: ICD-10-CM

## 2025-01-07 DIAGNOSIS — I10 PRIMARY HYPERTENSION: ICD-10-CM

## 2025-01-07 DIAGNOSIS — F41.1 GENERALIZED ANXIETY DISORDER: ICD-10-CM

## 2025-01-07 DIAGNOSIS — I25.10 ATHSCL HEART DISEASE OF NATIVE CORONARY ARTERY W/O ANG PCTRS: ICD-10-CM

## 2025-01-07 DIAGNOSIS — Z79.02 LONG TERM (CURRENT) USE OF ANTITHROMBOTICS/ANTIPLATELETS: ICD-10-CM

## 2025-01-07 DIAGNOSIS — N18.31 CHRONIC KIDNEY DISEASE, STAGE 3A: ICD-10-CM

## 2025-01-07 DIAGNOSIS — R41.3 MEMORY LOSS: ICD-10-CM

## 2025-01-07 DIAGNOSIS — E78.01 FAMILIAL HYPERCHOLESTEROLEMIA: ICD-10-CM

## 2025-01-07 DIAGNOSIS — R29.6 RECURRENT FALLS: ICD-10-CM

## 2025-01-07 DIAGNOSIS — I25.708 CORONARY ARTERY DISEASE OF BYPASS GRAFT OF NATIVE HEART WITH STABLE ANGINA PECTORIS: Primary | ICD-10-CM

## 2025-01-07 DIAGNOSIS — I50.9 CHRONIC CONGESTIVE HEART FAILURE, UNSPECIFIED HEART FAILURE TYPE: ICD-10-CM

## 2025-01-07 DIAGNOSIS — Z78.9 STATIN INTOLERANCE: ICD-10-CM

## 2025-01-07 DIAGNOSIS — Z95.1 S/P CABG (CORONARY ARTERY BYPASS GRAFT): ICD-10-CM

## 2025-01-07 PROCEDURE — 99999 PR PBB SHADOW E&M-EST. PATIENT-LVL IV: CPT | Mod: PBBFAC,,, | Performed by: INTERNAL MEDICINE

## 2025-01-07 PROCEDURE — 99214 OFFICE O/P EST MOD 30 MIN: CPT | Mod: PBBFAC | Performed by: INTERNAL MEDICINE

## 2025-01-07 RX ORDER — PRAVASTATIN SODIUM 40 MG/1
40 TABLET ORAL DAILY
Qty: 90 TABLET | Refills: 3 | Status: SHIPPED | OUTPATIENT
Start: 2025-01-07

## 2025-01-07 RX ORDER — BEMPEDOIC ACID 180 MG/1
1 TABLET, FILM COATED ORAL DAILY
Qty: 90 TABLET | Refills: 3 | Status: SHIPPED | OUTPATIENT
Start: 2025-01-07

## 2025-01-07 RX ORDER — DILTIAZEM HYDROCHLORIDE 180 MG/1
180 CAPSULE, EXTENDED RELEASE ORAL 2 TIMES DAILY
Qty: 180 CAPSULE | Refills: 3 | Status: SHIPPED | OUTPATIENT
Start: 2025-01-07 | End: 2026-01-07

## 2025-01-07 NOTE — PATIENT INSTRUCTIONS
Discussed diet , achieving and maintaining ideal body weight, and exercise.   We reviewed meds in detail.  Reassured-Discussed goals, options, plan.  Omega-3 > 800 mg/d combined EPA/DHA.  Goal BP< 130/80.  Goal LDL < 55 but anything we can get could improve risk  Could do co Q 10 200 mg/d for a few weeks before trying very low dose statin   Later could increase Enrtresto or Diltiazem  Diltiazem XR start with once but later could increase   Co Q 10 200 mg/d and after 2 weeks try Prava 40 at night  Bempedoic Acid Nexletol 180 mg daily  Stop Metoprolol and if pulse > 60 can double the labetalol  NTG should be refilled every 8-9 months.

## 2025-02-07 ENCOUNTER — LAB VISIT (OUTPATIENT)
Dept: LAB | Facility: HOSPITAL | Age: 75
End: 2025-02-07
Attending: INTERNAL MEDICINE
Payer: MEDICARE

## 2025-02-07 DIAGNOSIS — E78.5 HYPERLIPIDEMIA, UNSPECIFIED HYPERLIPIDEMIA TYPE: ICD-10-CM

## 2025-02-07 LAB
CHOLEST SERPL-MCNC: 162 MG/DL (ref 120–199)
CHOLEST/HDLC SERPL: 3.2 {RATIO} (ref 2–5)
HDLC SERPL-MCNC: 50 MG/DL (ref 40–75)
HDLC SERPL: 30.9 % (ref 20–50)
LDLC SERPL CALC-MCNC: 89.2 MG/DL (ref 63–159)
NONHDLC SERPL-MCNC: 112 MG/DL
TRIGL SERPL-MCNC: 114 MG/DL (ref 30–150)

## 2025-02-07 PROCEDURE — 36415 COLL VENOUS BLD VENIPUNCTURE: CPT | Mod: PO | Performed by: INTERNAL MEDICINE

## 2025-02-07 PROCEDURE — 80061 LIPID PANEL: CPT | Performed by: INTERNAL MEDICINE

## 2025-02-22 DIAGNOSIS — Z00.00 ENCOUNTER FOR MEDICARE ANNUAL WELLNESS EXAM: ICD-10-CM

## 2025-02-27 ENCOUNTER — TELEPHONE (OUTPATIENT)
Dept: CARDIOLOGY | Facility: CLINIC | Age: 75
End: 2025-02-27
Payer: MEDICARE

## 2025-02-27 DIAGNOSIS — R60.9 EDEMA, UNSPECIFIED TYPE: Primary | ICD-10-CM

## 2025-02-27 DIAGNOSIS — N18.31 CHRONIC KIDNEY DISEASE, STAGE 3A: ICD-10-CM

## 2025-02-27 DIAGNOSIS — I50.9 CHRONIC CONGESTIVE HEART FAILURE, UNSPECIFIED HEART FAILURE TYPE: ICD-10-CM

## 2025-02-28 ENCOUNTER — LAB VISIT (OUTPATIENT)
Dept: LAB | Facility: HOSPITAL | Age: 75
End: 2025-02-28
Attending: INTERNAL MEDICINE
Payer: MEDICARE

## 2025-02-28 ENCOUNTER — CLINICAL SUPPORT (OUTPATIENT)
Dept: FAMILY MEDICINE | Facility: CLINIC | Age: 75
End: 2025-02-28
Payer: MEDICARE

## 2025-02-28 VITALS — SYSTOLIC BLOOD PRESSURE: 124 MMHG | DIASTOLIC BLOOD PRESSURE: 62 MMHG | HEART RATE: 58 BPM

## 2025-02-28 DIAGNOSIS — N18.31 CHRONIC KIDNEY DISEASE, STAGE 3A: ICD-10-CM

## 2025-02-28 DIAGNOSIS — I10 PRIMARY HYPERTENSION: Primary | ICD-10-CM

## 2025-02-28 DIAGNOSIS — I50.9 CHRONIC CONGESTIVE HEART FAILURE, UNSPECIFIED HEART FAILURE TYPE: ICD-10-CM

## 2025-02-28 DIAGNOSIS — R60.9 EDEMA, UNSPECIFIED TYPE: ICD-10-CM

## 2025-02-28 PROCEDURE — 80048 BASIC METABOLIC PNL TOTAL CA: CPT | Performed by: INTERNAL MEDICINE

## 2025-02-28 PROCEDURE — 36415 COLL VENOUS BLD VENIPUNCTURE: CPT | Mod: PO | Performed by: INTERNAL MEDICINE

## 2025-02-28 NOTE — Clinical Note
Paola Wood 74 y.o. female is here today for Blood Pressure check.  History of HTN yes.   Patient verifies taking blood pressure medications on a regular basis at the same time of the day.   Does patient have record of home blood pressure readings no.  Patient is asymptomatic.  Complains of none.  Blood pressure reading was 124/62, Pulse 58. Dr. Duron  notified.

## 2025-02-28 NOTE — TELEPHONE ENCOUNTER
----- Message from Jassi Rico MD sent at 2/27/2025  2:59 PM CST -----  Regarding: RE: Swollen  Really needs, to see someone with ECG, chem 7 and Pro BNP- for now stop the diltiazem , double ivanna Entresto and go to middle dose , and add Furosimide 20 mg daily for 3 straight days and then 20 mg just 3 times per week,CJL  ----- Message -----  From: Shelley Rueda RN  Sent: 2/27/2025   1:12 PM CST  To: Jassi Rico MD; Shelley Rueda RN  Subject: FW: Swollen                                      Pt was started on statin, diltiazem, and bempedoic acid at her last visit w. dr Rico. She currently has swelling to both feet ankles, which does not really improve overnight. She has been forced to wear slippers. She says that it begun when she started the new medications. She also says that she has a history of statins allergy. I was going to schedule her to assess but she lives in [place] and has transportation issues. She might be able to do a virtual w. family assistance, Please advise,  ----- Message -----  From: Tanvi Pelayo  Sent: 2/27/2025  11:11 AM CST  To: Shelley Rueda RN  Subject: Swollen                                          Patient was prescribed 3 medication at last visit and her foot are swollen. Please call back @ [phone]. Thank you Tanvi

## 2025-02-28 NOTE — TELEPHONE ENCOUNTER
Spoke to pt about 3 hrs ago and updated her on dr Rico's orders. She was scheduled for labs and agreed to date/time of appointment(s).Prescriptions sent to md.  Pt states that she cannot come in for appt due to transportation issue. I just called her back as discussed to see if was able to get a ride. She stated that she could not come tomorrow but will give me a call to schedule when has transportation.

## 2025-02-28 NOTE — TELEPHONE ENCOUNTER
----- Message from Jassi Rico MD sent at 2/27/2025  2:59 PM CST -----  Regarding: RE: Swollen  Really needs, to see someone with ECG, chem 7 and Pro BNP- for now stop the diltiazem , double ivanna Entresto and go to middle dose , and add Furosimide 20 mg daily for 3 straight days and then 20 mg just 3 times per week,CJL  ----- Message -----  From: Shelley Rueda RN  Sent: 2/27/2025   1:12 PM CST  To: Jassi Rico MD; Shelley Rueda RN  Subject: FW: Swollen                                      Pt was started on statin, diltiazem, and bempedoic acid at her last visit w. dr Rico. She currently has swelling to both feet ankles, which does not really improve overnight. She has been forced to wear slippers. She says that it begun when she started the new medications. She also says that she has a history of statins allergy. I was going to schedule her to assess but she lives in [place] and has transportation issues. She might be able to do a virtual w. family assistance, Please advise,  ----- Message -----  From: Tanvi Pelayo  Sent: 2/27/2025  11:11 AM CST  To: Shelley Rueda RN  Subject: Swollen                                          Patient was prescribed 3 medication at last visit and her foot are swollen. Please call back @ [phone]. Thank you Tanvi

## 2025-03-01 ENCOUNTER — RESULTS FOLLOW-UP (OUTPATIENT)
Dept: CARDIOLOGY | Facility: CLINIC | Age: 75
End: 2025-03-01

## 2025-03-01 LAB
ANION GAP SERPL CALC-SCNC: 10 MMOL/L (ref 8–16)
BUN SERPL-MCNC: 21 MG/DL (ref 8–23)
CALCIUM SERPL-MCNC: 9.6 MG/DL (ref 8.7–10.5)
CHLORIDE SERPL-SCNC: 109 MMOL/L (ref 95–110)
CO2 SERPL-SCNC: 21 MMOL/L (ref 23–29)
CREAT SERPL-MCNC: 1.2 MG/DL (ref 0.5–1.4)
EST. GFR  (NO RACE VARIABLE): 47.8 ML/MIN/1.73 M^2
GLUCOSE SERPL-MCNC: 92 MG/DL (ref 70–110)
POTASSIUM SERPL-SCNC: 4.7 MMOL/L (ref 3.5–5.1)
SODIUM SERPL-SCNC: 140 MMOL/L (ref 136–145)

## 2025-03-03 RX ORDER — SACUBITRIL AND VALSARTAN 49; 51 MG/1; MG/1
1 TABLET, FILM COATED ORAL 2 TIMES DAILY
Qty: 180 TABLET | Refills: 3 | Status: SHIPPED | OUTPATIENT
Start: 2025-03-03

## 2025-03-03 RX ORDER — FUROSEMIDE 20 MG/1
TABLET ORAL
Qty: 40 TABLET | Refills: 3 | Status: SHIPPED | OUTPATIENT
Start: 2025-03-03

## 2025-03-04 LAB — NT-PROBNP SERPL IA-MCNC: 366 PG/ML

## 2025-03-05 NOTE — PROGRESS NOTES
pt was updated on dr Rico's comments and verbalized understanding. Please refer to other message updating her status.

## 2025-04-01 ENCOUNTER — RESULTS FOLLOW-UP (OUTPATIENT)
Dept: CARDIOLOGY | Facility: CLINIC | Age: 75
End: 2025-04-01

## 2025-04-01 ENCOUNTER — DOCUMENTATION ONLY (OUTPATIENT)
Dept: FAMILY MEDICINE | Facility: CLINIC | Age: 75
End: 2025-04-01
Payer: MEDICARE

## 2025-04-01 ENCOUNTER — TELEPHONE (OUTPATIENT)
Dept: CARDIOLOGY | Facility: CLINIC | Age: 75
End: 2025-04-01
Payer: MEDICARE

## 2025-04-01 ENCOUNTER — LAB VISIT (OUTPATIENT)
Dept: LAB | Facility: HOSPITAL | Age: 75
End: 2025-04-01
Attending: INTERNAL MEDICINE
Payer: MEDICARE

## 2025-04-01 DIAGNOSIS — N18.31 CHRONIC KIDNEY DISEASE, STAGE 3A: ICD-10-CM

## 2025-04-01 DIAGNOSIS — I25.708 CORONARY ARTERY DISEASE OF BYPASS GRAFT OF NATIVE HEART WITH STABLE ANGINA PECTORIS: ICD-10-CM

## 2025-04-01 DIAGNOSIS — E78.5 HYPERLIPIDEMIA, UNSPECIFIED HYPERLIPIDEMIA TYPE: ICD-10-CM

## 2025-04-01 DIAGNOSIS — Z78.9 STATIN INTOLERANCE: ICD-10-CM

## 2025-04-01 DIAGNOSIS — I50.9 CHRONIC CONGESTIVE HEART FAILURE, UNSPECIFIED HEART FAILURE TYPE: ICD-10-CM

## 2025-04-01 LAB
ALBUMIN SERPL BCP-MCNC: 3.8 G/DL (ref 3.5–5.2)
ALP SERPL-CCNC: 56 UNIT/L (ref 40–150)
ALT SERPL W/O P-5'-P-CCNC: 8 UNIT/L (ref 10–44)
ANION GAP (OHS): 12 MMOL/L (ref 8–16)
AST SERPL-CCNC: 15 UNIT/L (ref 11–45)
BILIRUB SERPL-MCNC: 0.4 MG/DL (ref 0.1–1)
BNP SERPL-MCNC: 225 PG/ML (ref 0–99)
BUN SERPL-MCNC: 21 MG/DL (ref 8–23)
CALCIUM SERPL-MCNC: 9.6 MG/DL (ref 8.7–10.5)
CHLORIDE SERPL-SCNC: 106 MMOL/L (ref 95–110)
CHOLEST SERPL-MCNC: 165 MG/DL (ref 120–199)
CHOLEST/HDLC SERPL: 3.5 {RATIO} (ref 2–5)
CK SERPL-CCNC: 84 U/L (ref 20–180)
CO2 SERPL-SCNC: 22 MMOL/L (ref 23–29)
CREAT SERPL-MCNC: 1.1 MG/DL (ref 0.5–1.4)
GFR SERPLBLD CREATININE-BSD FMLA CKD-EPI: 53 ML/MIN/1.73/M2
GLUCOSE SERPL-MCNC: 117 MG/DL (ref 70–110)
HDLC SERPL-MCNC: 47 MG/DL (ref 40–75)
HDLC SERPL: 28.5 % (ref 20–50)
LDLC SERPL CALC-MCNC: 96.6 MG/DL (ref 63–159)
NONHDLC SERPL-MCNC: 118 MG/DL
POTASSIUM SERPL-SCNC: 4.1 MMOL/L (ref 3.5–5.1)
PROT SERPL-MCNC: 7.5 GM/DL (ref 6–8.4)
SODIUM SERPL-SCNC: 140 MMOL/L (ref 136–145)
TRIGL SERPL-MCNC: 107 MG/DL (ref 30–150)

## 2025-04-01 PROCEDURE — 83880 ASSAY OF NATRIURETIC PEPTIDE: CPT

## 2025-04-01 PROCEDURE — 36415 COLL VENOUS BLD VENIPUNCTURE: CPT | Mod: PO

## 2025-04-01 PROCEDURE — 80053 COMPREHEN METABOLIC PANEL: CPT

## 2025-04-01 PROCEDURE — 83695 ASSAY OF LIPOPROTEIN(A): CPT

## 2025-04-01 PROCEDURE — 82550 ASSAY OF CK (CPK): CPT

## 2025-04-01 PROCEDURE — 80061 LIPID PANEL: CPT

## 2025-04-01 NOTE — PROGRESS NOTES
"Pt in clinic for lab work and requested BP be checked. She denied any symptoms, but stated she would like her BP checked due to "changes in medication". Manual BP performed, 112/72. Pt left the clinic happy with reading and in no distress.    "

## 2025-04-01 NOTE — TELEPHONE ENCOUNTER
----- Message from Dulce sent at 4/1/2025  9:22 AM CDT -----  Regarding: Return Call  Contact: Dr Shweta Amos in [place]  Type:  Patient Returning CallWho Called:Bette Who Left Message for Patient: Bette Does the patient know what this is regarding?: Cardia Clearance Would the patient rather a call back or a response via [facility]?  Call back Best Call Back Number: [phone] ext [phone]  Fax's [phone] Information: Bette stated that she has faxed this twice and received no response.  Please fax this information and this is the last day before the patient has her procedure.Thanks,SJ

## 2025-04-01 NOTE — TELEPHONE ENCOUNTER
Dr. Reyes is requesting clearance for patient  Procedure: Colonoscopy   Date of procedure: 04/07/2025  Please include instructions on length of time patient can hold Plavix prior to the procedure.

## 2025-04-01 NOTE — TELEPHONE ENCOUNTER
----- Message from Med Assistant oLbo sent at 4/1/2025 10:31 AM CDT -----  Contact: self [phone]  Patient need to have a colonoscopy done and need to be off her plavix.  States the doctor office has been trying to get a release to have this done.  Please call.  Thanks

## 2025-04-03 ENCOUNTER — TELEPHONE (OUTPATIENT)
Dept: CARDIOLOGY | Facility: CLINIC | Age: 75
End: 2025-04-03
Payer: MEDICARE

## 2025-04-03 NOTE — TELEPHONE ENCOUNTER
Results of recent labs given to pt by phone - Pt verbalized understanding of results and Dr Rico's Furosemide instructions.    Jassi Rico MD Regarding results: 4    4/1/25  8:06 PM  Result Note  HF blood test better but still high and she is not close to being dehydrated so can take 2 more Furosimide weekly. Get chem 7 and BNP in 3 months

## 2025-04-07 LAB
CRC RECOMMENDATION EXT: NORMAL
W LP(A): 113 MG/DL

## 2025-04-15 ENCOUNTER — PATIENT OUTREACH (OUTPATIENT)
Dept: ADMINISTRATIVE | Facility: HOSPITAL | Age: 75
End: 2025-04-15
Payer: MEDICARE

## 2025-04-15 NOTE — LETTER
[id number]    AUTHORIZATION FOR RELEASE OF   CONFIDENTIAL INFORMATION    Dear Dr. Rock,    We are seeing Paola Wood, date of birth 1950, in the clinic at [facility]. Grace Duron MD is the patient's PCP. Paola Wood has an outstanding lab/procedure at the time we reviewed her chart. In order to help keep her health information updated, she has authorized us to request the following medical record(s):            ( x ) 2025 COLON PATHOLOGY REPORT           Please fax/email records to:  Fax #: [phone]  Email: [email or web address]     If you have any questions, please contact    RENATA Gonzalez @ [phone]        Patient Name: Paola Wood  : 1950  Patient Phone #: [phone]

## 2025-04-15 NOTE — PROGRESS NOTES
Manually uploaded and hyper-linked COLONOSCOPY 4/7/2025  LESLYE faxed to Dr. Rock 1x to request colon pathology report. Reminder set.

## 2025-04-17 DIAGNOSIS — K21.9 GASTROESOPHAGEAL REFLUX DISEASE, UNSPECIFIED WHETHER ESOPHAGITIS PRESENT: ICD-10-CM

## 2025-04-17 RX ORDER — PANTOPRAZOLE SODIUM 40 MG/1
40 TABLET, DELAYED RELEASE ORAL
Qty: 90 TABLET | Refills: 1 | Status: SHIPPED | OUTPATIENT
Start: 2025-04-17

## 2025-04-17 NOTE — TELEPHONE ENCOUNTER
Paola Wood  is requesting a refill authorization.  Brief Assessment and Rationale for Refill:  Approve     Medication Therapy Plan:         Comments:     Note composed:1:52 PM 04/17/2025

## 2025-04-17 NOTE — TELEPHONE ENCOUNTER
No care due was identified.  [facility] Embedded Care Due Messages. Reference number: [id number].   4/17/2025 8:58:57 AM CDT

## 2025-05-07 ENCOUNTER — OFFICE VISIT (OUTPATIENT)
Dept: FAMILY MEDICINE | Facility: CLINIC | Age: 75
End: 2025-05-07
Payer: MEDICARE

## 2025-05-07 VITALS
BODY MASS INDEX: 27.13 KG/M2 | RESPIRATION RATE: 18 BRPM | OXYGEN SATURATION: 98 % | SYSTOLIC BLOOD PRESSURE: 128 MMHG | DIASTOLIC BLOOD PRESSURE: 66 MMHG | WEIGHT: 153.13 LBS | TEMPERATURE: 97 F | HEIGHT: 63 IN | HEART RATE: 60 BPM

## 2025-05-07 DIAGNOSIS — I70.0 AORTIC ATHEROSCLEROSIS: ICD-10-CM

## 2025-05-07 DIAGNOSIS — G40.309 GENERALIZED CONVULSIVE EPILEPSY: ICD-10-CM

## 2025-05-07 DIAGNOSIS — Z00.00 ANNUAL PHYSICAL EXAM: Primary | ICD-10-CM

## 2025-05-07 DIAGNOSIS — N18.32 STAGE 3B CHRONIC KIDNEY DISEASE: ICD-10-CM

## 2025-05-07 DIAGNOSIS — I50.32 CHRONIC DIASTOLIC CONGESTIVE HEART FAILURE: ICD-10-CM

## 2025-05-07 PROBLEM — M16.11 UNILATERAL PRIMARY OSTEOARTHRITIS, RIGHT HIP: Status: ACTIVE | Noted: 2025-05-07

## 2025-05-07 PROCEDURE — 99215 OFFICE O/P EST HI 40 MIN: CPT | Mod: PBBFAC,PO | Performed by: STUDENT IN AN ORGANIZED HEALTH CARE EDUCATION/TRAINING PROGRAM

## 2025-05-07 PROCEDURE — 99999 PR PBB SHADOW E&M-EST. PATIENT-LVL V: CPT | Mod: PBBFAC,,, | Performed by: STUDENT IN AN ORGANIZED HEALTH CARE EDUCATION/TRAINING PROGRAM

## 2025-05-07 NOTE — PROGRESS NOTES
Assessment/Plan:    ANNUAL EXAM   patient here for annual exam.    - Labs ordered. Health maintenance was reviewed and ordered. Medications were reviewed and reconciled.  - All questions were answered. Advised of Wellness plan.   - Follow up in 1 year for ANNUAL WELLNESS EXAM    1. Annual physical exam    2. Aortic atherosclerosis  Overview:  Stable on repatha       3. Generalized convulsive epilepsy  Overview:  1 episode (2014), unknown etiology ?TIA/CVA  She hasn't been on antiepileptics   Declines neurology referral     MRA Brain 2022 at [facility]   IMPRESSION:   No large vessel occlusion.     Focal stenoses in the cavernous segment of the right internal carotid artery, supraclinoid segment of the left internal carotid artery, proximal M1 segment of the right middle cerebral artery, distal V4 segment of the right vertebral artery, and proximal   basilar artery. Tandem stenoses are also noted in the posterior cerebral arteries bilaterally.          MRI brain 2022 at [facility]   Impression    No acute intracranial abnormality.     Mild deep white matter leukomalacia of chronic microvascular disease.     Development of a right mastoid effusion.              4. Stage 3b chronic kidney disease  Overview:    Lab Results   Component Value Date     04/01/2025    K 4.1 04/01/2025     04/01/2025    CO2 22 (L) 04/01/2025    BUN 21 04/01/2025    CREATININE 1.1 04/01/2025    CALCIUM 9.6 04/01/2025    ANIONGAP 12 04/01/2025    EGFRNORACEVR 53 (L) 04/01/2025     Chronic; stable   Avoid nephrotoxic agents   Referral to nephrology         5. Chronic diastolic congestive heart failure  Overview:  Nml EF, GIIDD  Gdmt  Cont repatha and ASA  Lasix every 2 days  Routine cards f/u            ## GASTROESOPHAGEAL REFLUX DISEASE (GERD):  - Continue pantoprazole as needed for acid reflux control, not requiring daily administration.  - Discussed acid reflux management strategies with the patient.    ## HISTORY OF TRANSIENT  ISCHEMIC ATTACK (TIA):  - Patient had TIA in 2014 with no recurrent episodes or residual deficits.  - Discussed the difference between TIA and stroke    ## ASPIRIN THERAPY:  - Continue aspirin as prescribed.  - Discussed the importance of aspirin in the patient's overall treatment plan.    ## FOLLOW-UP AND GENERAL RECOMMENDATIONS:  - Patient to increase walking for exercise to strengthen heart.  - Follow up in 6 months.  - Contact the office if needed sooner.           Grace Duron MD  _________________________________________________________________________      Patient ID: Paola Wood is a 74 y.o. female.    Chief Complaint:  Encounter for annual exam    HPI: Patient here for a comprehensive physical exam.      CARDIOVASCULAR:  She has a history of fluid around heart and pedal edema, which improved with diuretic therapy. She initially discontinued furosemide due to frequent urination but has since resumed taking it 3 times per week on alternating days, excluding weekends. She takes Entresto and Ranexa for chest pain management.    Reports feeling well. No concerns. Taking all meds as prescribed. Denies fevers, chills, chest pain, SOB, fatigue, abdominal pain.     PHYSICAL ACTIVITY:  She reports minimal physical activity, limited to occasional walking.                 Health Maintenance Topics with due status: Not Due       Topic Last Completion Date    DEXA Scan 11/14/2023    Annual UACr 10/28/2024    Mammogram 11/25/2024    Lipid Panel 04/01/2025    Colorectal Cancer Screening 04/07/2025    High Dose Statin 05/07/2025        ==============================================  History reviewed.   Health Maintenance Due   Topic Date Due    Hepatitis C Screening  Never done    TETANUS VACCINE  Never done    RSV Vaccine (Age 60+ and Pregnant patients) (1 - Risk 60-74 years 1-dose series) Never done    COVID-19 Vaccine (7 - 2024-25 season) 09/01/2024       Past Medical History:  Past Medical History:    Diagnosis Date    Allergy     AR (allergic rhinitis)     Asthma     CAD (coronary artery disease)     CHF (congestive heart failure)     Chronic pain     neck/back/arm/knee    GERD (gastroesophageal reflux disease)     Hypertension     Seizures     SOB (shortness of breath)     TIA (transient ischemic attack)      Past Surgical History:   Procedure Laterality Date    CATARACT EXTRACTION      CORONARY ANGIOPLASTY WITH STENT PLACEMENT      CORONARY ARTERY BYPASS GRAFT      HYSTERECTOMY  1983    partial (Ovaries removed 1998)    OOPHORECTOMY  1998     Review of patient's allergies indicates:   Allergen Reactions    Ace inhibitors Itching, Swelling and Other (See Comments)     Other reaction(s): tongue swelling    Other reaction(s): Other (See Comments)    Mild itching    Product containing angiotensin-converting enzyme inhibitor (product)    Amlodipine Other (See Comments)    Duloxetine Other (See Comments)    Naloxegol Other (See Comments)    Neomycin Other (See Comments)    Niacin Other (See Comments)    Olmesartan Other (See Comments)    Polymyxin b Other (See Comments)    Ramipril     Atorvastatin Other (See Comments), Rash and Nausea And Vomiting     Myalgia    atorvastatin    Bacitracin Itching     Mild itching    bacitracin    Ezetimibe Other (See Comments)     Myalgia    ezetimibe    Lisinopril Rash     Other reaction(s): Not available    Rosuvastatin Nausea And Vomiting and Itching     Myalgia    Simvastatin Itching     Mild itching    Statins-hmg-coa reductase inhibitors Nausea And Vomiting, Itching and Other (See Comments)     Other reaction(s): Not available    simvastatin    Product containing 3-hydroxy-3-methylglutaryl-coenzyme A reductase inhibitor (product)    rosuvastatin     Medications Ordered Prior to Encounter[1]  Social History[2]  Family History   Problem Relation Name Age of Onset    Heart disease Mother      Heart disease Father      No Known Problems Maternal Grandmother      No Known  Problems Maternal Grandfather      No Known Problems Paternal Grandmother      No Known Problems Paternal Grandfather      Breast cancer Sister  53               Objective:    Nursing note and vitals reviewed.  Vitals:    05/07/25 0906   BP: 128/66   Pulse:    Resp:    Temp:      Body mass index is 27.12 kg/m².     Physical Exam   Constitutional: oriented to person, place, and time. well-developed and well-nourished. No distress.   HENT: WNL  Head: Normocephalic and atraumatic.   Eyes: EOM are normal.   Neck: Normal range of motion. Neck supple.   Cardiovascular: Normal rate  Pulmonary/Chest: Effort normal. No respiratory distress. Ctab, no crackles  GI: soft, non distended, no ttp, no rebound/guarding  Musculoskeletal: Normal range of motion. no edema.   Neurological: CN II-XII intact  Skin: warm and dry.   Psychiatric: normal mood and affect. behavior is normal.                 We Offer Telehealth & Same Day Appointments!   Book your Telehealth appointment with me through my nurse or   Clinic appointments on [employer]!  Hdkiiv-[phone]     To Schedule appointments online, go to [employer]: [email or web address]     This note was generated with the assistance of ambient listening technology. Verbal consent was obtained by the patient and accompanying visitor(s) for the recording of patient appointment to facilitate this note. I attest to having reviewed and edited the generated note for accuracy, though some syntax or spelling errors may persist. Please contact the author of this note for any clarification.             [1]   Current Outpatient Medications on File Prior to Visit   Medication Sig Dispense Refill    aspirin (ECOTRIN) 81 MG EC tablet Take 81 mg by mouth once daily.      bempedoic acid (NEXLETOL) 180 mg Tab Take 1 tablet (180 mg total) by mouth once daily. 90 tablet 3    cloNIDine (CATAPRES) 0.2 MG tablet Take 1 tablet (0.2 mg total) by mouth 2 (two) times daily. 180 tablet 3     clopidogreL (PLAVIX) 75 mg tablet Take 1 tablet (75 mg total) by mouth once daily. 90 tablet 3    evolocumab (REPATHA SURECLICK) 140 mg/mL PnIj Inject 1 mL (140 mg total) into the skin every 14 (fourteen) days. 6 each 3    furosemide (LASIX) 20 MG tablet Take one tablet by mouth daily for 3 days then take one tablet 3 days a week only (not 2 days in a row). 40 tablet 3    labetaloL (NORMODYNE) 200 MG tablet Take 1 tablet (200 mg total) by mouth 2 (two) times daily. 60 tablet 11    nitroGLYCERIN (NITROSTAT) 0.4 MG SL tablet Place 1 tablet (0.4 mg total) under the tongue every 5 (five) minutes as needed for Chest pain. use as directed 25 tablet 0    oxyCODONE-acetaminophen (PERCOCET)  mg per tablet Take 1 tablet by mouth every 4 (four) hours as needed.      pantoprazole (PROTONIX) 40 MG tablet TAKE 1 TABLET BY MOUTH ONCE DAILY 90 tablet 1    pravastatin (PRAVACHOL) 40 MG tablet Take 1 tablet (40 mg total) by mouth once daily. 90 tablet 3    ranolazine (RANEXA) 500 MG Tb12 TAKE 1 TABLET BY MOUTH TWICE DAILY 90 tablet 3    sacubitriL-valsartan (ENTRESTO) 49-51 mg per tablet Take 1 tablet by mouth 2 (two) times daily. 180 tablet 3    [DISCONTINUED] evolocumab (REPATHA SURECLICK) 140 mg/mL PnIj Inject 1 mL (140 mg total) into the skin every 14 (fourteen) days. 6 each 3     No current facility-administered medications on file prior to visit.   [2]   Social History  Socioeconomic History    Marital status:    Tobacco Use    Smoking status: Never    Smokeless tobacco: Never   Substance and Sexual Activity    Alcohol use: No    Drug use: No    Sexual activity: Not Currently     Social Drivers of Health     Food Insecurity: No Food Insecurity (5/9/2024)    Hunger Vital Sign     Worried About Running Out of Food in the Last Year: Never true     Ran Out of Food in the Last Year: Never true   Transportation Needs: No Transportation Needs (5/9/2024)    TRANSPORTATION NEEDS     Transportation : No   Housing Stability:  Unknown (5/9/2024)    Housing Stability Vital Sign     Unable to Pay for Housing in the Last Year: No     Homeless in the Last Year: No

## 2025-07-08 DIAGNOSIS — I50.9 CHRONIC CONGESTIVE HEART FAILURE, UNSPECIFIED HEART FAILURE TYPE: ICD-10-CM

## 2025-07-08 RX ORDER — RANOLAZINE 500 MG/1
500 TABLET, EXTENDED RELEASE ORAL 2 TIMES DAILY
Qty: 90 TABLET | Refills: 3 | Status: SHIPPED | OUTPATIENT
Start: 2025-07-08

## 2025-07-15 ENCOUNTER — OFFICE VISIT (OUTPATIENT)
Dept: NEPHROLOGY | Facility: CLINIC | Age: 75
End: 2025-07-15
Payer: MEDICARE

## 2025-07-15 ENCOUNTER — HOSPITAL ENCOUNTER (OUTPATIENT)
Dept: RADIOLOGY | Facility: HOSPITAL | Age: 75
Discharge: HOME | End: 2025-07-15
Attending: INTERNAL MEDICINE
Payer: MEDICARE

## 2025-07-15 VITALS
WEIGHT: 152.13 LBS | BODY MASS INDEX: 26.95 KG/M2 | SYSTOLIC BLOOD PRESSURE: 122 MMHG | HEART RATE: 57 BPM | DIASTOLIC BLOOD PRESSURE: 60 MMHG | HEIGHT: 63 IN | OXYGEN SATURATION: 99 %

## 2025-07-15 DIAGNOSIS — N18.31 STAGE 3A CHRONIC KIDNEY DISEASE: Primary | ICD-10-CM

## 2025-07-15 DIAGNOSIS — N18.31 STAGE 3A CHRONIC KIDNEY DISEASE: ICD-10-CM

## 2025-07-15 PROCEDURE — 99214 OFFICE O/P EST MOD 30 MIN: CPT | Mod: PBBFAC,25,PO | Performed by: INTERNAL MEDICINE

## 2025-07-15 PROCEDURE — 76770 US EXAM ABDO BACK WALL COMP: CPT | Mod: TC,PO

## 2025-07-15 PROCEDURE — 76770 US EXAM ABDO BACK WALL COMP: CPT | Mod: 26,,, | Performed by: RADIOLOGY

## 2025-07-15 PROCEDURE — 99999 PR PBB SHADOW E&M-EST. PATIENT-LVL IV: CPT | Mod: PBBFAC,,, | Performed by: INTERNAL MEDICINE

## 2025-07-15 PROCEDURE — 99204 OFFICE O/P NEW MOD 45 MIN: CPT | Mod: S$PBB,,, | Performed by: INTERNAL MEDICINE

## 2025-07-15 NOTE — PROGRESS NOTES
Subjective:      Patient ID: Paola Wood is a 74 y.o. Other female who presents for new evaluation of Consult    HPI    July 2025:  She is referred for abnormal kidney function, recent sCr 1.1-1.4mg/dL   Patient presents today for initial nephrology consultation.   -She was hospitalized in May of last year with weakened kidney function, which has since significantly improved. She currently denies any acute kidney-related symptoms and reports feeling well. She has been proactively seeking specialist evaluation for kidney health.   -She has a history of high blood pressure that was successfully managed by Cardiology in [place], historically uncontrolled.   - She experiences acid reflux when not taking Protonix, with symptoms of gagging and acid sensation in abdomen. She previously decreased Protonix to 3X a week but restarted due to symptomatic acid reflux. She has chronic bowel problems with multiple emergency room visits due to significant diarrhea requiring protective undergarments. She manages bowel issues by drinking honey, warm water, and lemon every morning. A colonoscopy was performed in February. She reports diarrhea persists despite increased water intake and previously prescribed Miralax.   - She has a history of migraines from age 17 to 2014, requiring frequent emergency room visits up to 3 times weekly. She currently denies active migraine symptoms but takes Excedrin approximately every six weeks for migraine pain.   - She reports a family history of kidney disease, with her brother having been on dialysis.     Past medical history significant for CAD status post CABG about 2 years ago and multiple coronary stents in the past -- most recently at [facility] on 4/16/2024 with stents placed to LAD, left circumflex, and left main, diastolic congestive heart failure, hypertension, asthma, allergic rhinitis, chronic back pain, chronic dyspnea, TIA, GERD, hyperlipidemia with statin intolerance, and  chronic anemia     Review of Systems   Constitutional:  Negative for activity change, appetite change and unexpected weight change.   HENT:  Negative for facial swelling.    Respiratory:  Negative for shortness of breath.    Cardiovascular:  Positive for leg swelling.   Gastrointestinal:  Positive for diarrhea.   Genitourinary:  Negative for difficulty urinating.   Allergic/Immunologic: Positive for immunocompromised state.   Psychiatric/Behavioral:  Negative for confusion and decreased concentration.       Objective:     Physical Exam  Vitals and nursing note reviewed.   Constitutional:       General: She is not in acute distress.     Appearance: She is not ill-appearing.   Cardiovascular:      Rate and Rhythm: Normal rate.   Pulmonary:      Breath sounds: No wheezing or rales.   Musculoskeletal:      Right lower leg: Edema (slightly puffy ankles B) present.      Left lower leg: Edema present.   Neurological:      Mental Status: She is alert and oriented to person, place, and time.   Psychiatric:         Mood and Affect: Mood normal.       Assessment:       1. Stage 3b chronic kidney disease       Plan:     STAGE 3A CHRONIC KIDNEY DISEASE:   - Reviewed renal function history, noting improvement since hospitalization a year ago. Her kidney function previous to this hospital stay on May 2024 revealed sCr < 1mg/dL.   - Explained the role of creatinine in assessing renal function.   - Assessed medication regimen for kidney safety, finding most current medications appropriate.   - Considered potential false elevation of creatinine due to Ranexa, planning cystatin C test to accurately assess renal function.   - Evaluated CKD risk factors, including CAD and previously uncontrolled HTN, and possibly long term PPI use.   - Plan comprehensive kidney workup including urine studies for proteinuria and renal US to assess kidney size and echogenicity.   - Discussed the relationship between hypertension and kidney damage.   -  Decreased Pantoprazole (Protonix) from daily use to 2-3 times per week and started OTC Pepcid on alternating days for acid reflux management.   - Provided information on the potential long-term effects of proton pump inhibitors on nutrient absorption and renal function.    - Follow up to review test results and discuss kidney health maintenance strategies.       45 minutes of total time spent on the encounter, which includes face to face time and non-face to face time preparing to see the patient (eg, review of tests), Obtaining and/or reviewing separately obtained history, Documenting clinical information in the electronic or other health record, Independently interpreting results (not separately reported) and communicating results to the patient/family/caregiver, or Care coordination (not separately reported).        Portions of this note were generated by [employer].

## 2025-07-24 ENCOUNTER — TELEPHONE (OUTPATIENT)
Dept: NEPHROLOGY | Facility: CLINIC | Age: 75
End: 2025-07-24
Payer: MEDICARE

## 2025-07-24 ENCOUNTER — OFFICE VISIT (OUTPATIENT)
Dept: NEPHROLOGY | Facility: CLINIC | Age: 75
End: 2025-07-24
Payer: MEDICARE

## 2025-07-24 VITALS
WEIGHT: 149.31 LBS | BODY MASS INDEX: 26.46 KG/M2 | OXYGEN SATURATION: 99 % | HEART RATE: 60 BPM | DIASTOLIC BLOOD PRESSURE: 62 MMHG | SYSTOLIC BLOOD PRESSURE: 122 MMHG | HEIGHT: 63 IN

## 2025-07-24 DIAGNOSIS — R80.9 PROTEINURIA, UNSPECIFIED TYPE: ICD-10-CM

## 2025-07-24 DIAGNOSIS — N28.1 RENAL CYST, LEFT: ICD-10-CM

## 2025-07-24 DIAGNOSIS — N18.31 STAGE 3A CHRONIC KIDNEY DISEASE: Primary | ICD-10-CM

## 2025-07-24 PROCEDURE — G2211 COMPLEX E/M VISIT ADD ON: HCPCS | Mod: ,,, | Performed by: INTERNAL MEDICINE

## 2025-07-24 PROCEDURE — 99215 OFFICE O/P EST HI 40 MIN: CPT | Mod: S$PBB,,, | Performed by: INTERNAL MEDICINE

## 2025-07-24 PROCEDURE — 99213 OFFICE O/P EST LOW 20 MIN: CPT | Mod: PBBFAC,PO | Performed by: INTERNAL MEDICINE

## 2025-07-24 PROCEDURE — 99999 PR PBB SHADOW E&M-EST. PATIENT-LVL III: CPT | Mod: PBBFAC,,, | Performed by: INTERNAL MEDICINE

## 2025-07-24 NOTE — PROGRESS NOTES
Subjective:      Patient ID: Paola Wood is a 74 y.o. Other female who presents for follow up evaluation of Chronic Kidney Disease    HPI    July 15 2025:  She is referred for abnormal kidney function, recent sCr 1.1-1.4mg/dL   Patient presents today for initial nephrology consultation.   -She was hospitalized in May of last year with weakened kidney function, which has since significantly improved. She currently denies any acute kidney-related symptoms and reports feeling well. She has been proactively seeking specialist evaluation for kidney health.   -She has a history of high blood pressure that was successfully managed by Cardiology in [place], historically uncontrolled.   - She experiences acid reflux when not taking Protonix, with symptoms of gagging and acid sensation in abdomen. She previously decreased Protonix to 3X a week but restarted due to symptomatic acid reflux. She has chronic bowel problems with multiple emergency room visits due to significant diarrhea requiring protective undergarments. She manages bowel issues by drinking honey, warm water, and lemon every morning. A colonoscopy was performed in February. She reports diarrhea persists despite increased water intake and previously prescribed Miralax.   - She has a history of migraines from age 17 to 2014, requiring frequent emergency room visits up to 3 times weekly. She currently denies active migraine symptoms but takes Excedrin approximately every six weeks for migraine pain.   - She reports a family history of kidney disease, with her brother having been on dialysis.   Past medical history significant for CAD status post CABG about 2 years ago and multiple coronary stents in the past -- most recently at [facility] on 4/16/2024 with stents placed to LAD, left circumflex, and left main, diastolic congestive heart failure, hypertension, asthma, allergic rhinitis, chronic back pain, chronic dyspnea, TIA, GERD, hyperlipidemia with  statin intolerance, and chronic anemia     July 2024: Here to review results     Review of Systems   Allergic/Immunologic: Positive for immunocompromised state.      Objective:     Physical Exam  Vitals and nursing note reviewed.   Constitutional:       General: She is not in acute distress.     Appearance: She is not ill-appearing.   Cardiovascular:      Rate and Rhythm: Normal rate.   Pulmonary:      Breath sounds: No wheezing or rales.   Musculoskeletal:      Right lower leg: Edema (slightly puffy ankles B) present.      Left lower leg: Edema present.   Neurological:      Mental Status: She is alert and oriented to person, place, and time.   Psychiatric:         Mood and Affect: Mood normal.     Assessment:       1. Stage 3a chronic kidney disease    2. Proteinuria, unspecified type    3. Renal cyst, left--possibly complex         Plan:     STAGE 3A CHRONIC KIDNEY DISEASE: - Assessed renal function using creatinine and cystatin C tests, noting Ranexa's effect on creatinine levels. - Current renal function (GFR 56 ml/min) represents mild CKD, close to normal range. - History of sepsis, heart surgery, and HTN are contributing factors to kidney condition. - Explained stages of CKD, current renal function status. - Patient to continue maintaining good BP control. - Patient to stay hydrated, primarily with water. - Patient to maintain current sodium limitation in diet. - Recommend increasing fruits and vegetables. - Continued valsartan portion of Entresto at current dose to manage proteinuria and BP. - Can continue Ranexa at current dose, noting it does not harm kidneys despite affecting creatinine levels. - Follow up in 6 months for comprehensive renal function reassessment.     PROTEINURIA, UNSPECIFIED TYPE: - Evaluated urine protein levels, finding mild proteinuria (360 mg/24hr) indicative of kidney damage. - Will monitor proteinuria trend. - Explained nature of proteinuria and its implications for kidney health. -  Continued valsartan portion Entresto at current dose to manage proteinuria and BP. - Ordered urine protein test in 6 months to monitor proteinuria levels.     RENAL CYST, LEFT: - Reviewed kidney US results, noting equal kidney sizes and 5 mm lesion on left kidney, potentially an angiomyolipoma or complex cyst. - Explained difference between simple and complex renal cysts. - Ordered kidney US in 3 months to reassess 5 mm lesion on left kidney. - Follow up in 3 months for kidney US (no office visit required). \      Renal us to evaluate cyst/angiomyolipoma in 3months  RTC 6mo to trend renal labs   45 minutes of total time spent on the encounter, which includes face to face time and non-face to face time preparing to see the patient (eg, review of tests), Obtaining and/or reviewing separately obtained history, Documenting clinical information in the electronic or other health record, Independently interpreting results (not separately reported) and communicating results to the patient/family/caregiver, or Care coordination (not separately reported).  Visit today included increased complexity associated with the care of the episodic problem addressed and/or managing the longitudinal care of the patient due to the serious and/or complex managed problem(s) as per above diagnosis list.           Portions of this note were generated by [employer].

## 2025-07-24 NOTE — TELEPHONE ENCOUNTER
Copied from CRM #[id number]. Topic: General Inquiry - Return Call  >> Jul 24, 2025  9:53 AM Marvel wrote:  Type:  Patient Returning Call    Who Called:Paola Wood   Who Left Message for Patient:Ava   Does the patient know what this is regarding?:an appt   Would the patient rather a call back or a response via [facility]? Call   Best Call Back Number:[phone]  Additional Information:

## 2025-07-29 ENCOUNTER — OFFICE VISIT (OUTPATIENT)
Dept: CARDIOLOGY | Facility: CLINIC | Age: 75
End: 2025-07-29
Payer: MEDICARE

## 2025-07-29 VITALS
WEIGHT: 148.31 LBS | DIASTOLIC BLOOD PRESSURE: 76 MMHG | BODY MASS INDEX: 26.28 KG/M2 | HEART RATE: 57 BPM | HEIGHT: 63 IN | SYSTOLIC BLOOD PRESSURE: 142 MMHG | OXYGEN SATURATION: 98 %

## 2025-07-29 DIAGNOSIS — Z98.61 S/P PTCA (PERCUTANEOUS TRANSLUMINAL CORONARY ANGIOPLASTY): ICD-10-CM

## 2025-07-29 DIAGNOSIS — I50.32 CHRONIC DIASTOLIC CONGESTIVE HEART FAILURE: ICD-10-CM

## 2025-07-29 DIAGNOSIS — Z95.1 S/P CABG (CORONARY ARTERY BYPASS GRAFT): ICD-10-CM

## 2025-07-29 DIAGNOSIS — I25.708 CORONARY ARTERY DISEASE OF BYPASS GRAFT OF NATIVE HEART WITH STABLE ANGINA PECTORIS: ICD-10-CM

## 2025-07-29 DIAGNOSIS — I11.0 HYPERTENSIVE HEART DISEASE WITH HEART FAILURE: ICD-10-CM

## 2025-07-29 DIAGNOSIS — I25.10 ATHSCL HEART DISEASE OF NATIVE CORONARY ARTERY W/O ANG PCTRS: Primary | ICD-10-CM

## 2025-07-29 PROCEDURE — 99999 PR PBB SHADOW E&M-EST. PATIENT-LVL III: CPT | Mod: PBBFAC,,, | Performed by: INTERNAL MEDICINE

## 2025-07-29 PROCEDURE — 99214 OFFICE O/P EST MOD 30 MIN: CPT | Mod: S$PBB,,, | Performed by: INTERNAL MEDICINE

## 2025-07-29 PROCEDURE — 99213 OFFICE O/P EST LOW 20 MIN: CPT | Mod: PBBFAC,PO | Performed by: INTERNAL MEDICINE

## 2025-07-29 NOTE — PROGRESS NOTES
Subjective   Patient ID:  Paola Wood is a 74 y.o. female who presents for follow-up of No chief complaint on file.      HPI74 yo WF with extensive cardiac hx and underwent procedures as described below. Has severe HLD but has been intolerant of statins She is difficult to manage because she list allergies to almost everything but is tolerating meds that she states she is allergic to.  Saw Dr Rico who made med changes and lipids and BP improved and patient states having no cardiac issues.     SSESSMENT AND PLAN: 4-       1. Angiographically severe coronary artery disease with heavy   calcification with occluded graft to the left coronaries and atretic left   internal mammary artery.      2. Patent graft to the RCA PDA with patent stent in the mid graft.      3. Normal left ventricular end-diastolic pressure.      4. Normal ejection fraction.      5. Successful high risk percutaneous coronary intervention with   reconstruction of the left system with multiple stents to left anterior   descending and left circumflex using mini-crush technique in OM1 and OM2   bifurcation and in the left main left   anterior descending, left circumflex bifurcation with good angiographic result      Past procedures  1. CAD s/p PTCA X 2 mRCA 8/10, 9/10; s/p CABG X 4v (LIMA-LAD-atretic, SVG-OM1/OM2, SVG-RCA) 2011; s/pPCI mLAD 3.0 X 12 mm [facility] 4/13; s/p PCI SVG-PDA 3.5 X 15 mm [place] 1/3/2023       Summary  Show Result Comparison     Left Ventricle: The left ventricle is normal in size. There is concentric remodeling. There is normal systolic function. Ejection fraction by visual approximation is 70%. Grade II diastolic dysfunction.    Right Ventricle: Normal right ventricular cavity size. Systolic function is borderline low.    Left Atrium: Left atrium is moderately dilated.    Aortic Valve: Aortic valve peak velocity is 2.05 m/s. Mean gradient is 9 mmHg.    Mitral Valve: There is mild regurgitation with a centrally  directed jet.    Tricuspid Valve: There is mild regurgitation.    Pulmonary Artery: The estimated pulmonary artery     Review of Systems   Constitutional: Negative for decreased appetite, fever, malaise/fatigue, weight gain and weight loss.   HENT:  Negative for hearing loss and nosebleeds.    Eyes:  Negative for visual disturbance.   Cardiovascular:  Negative for chest pain, claudication, cyanosis, dyspnea on exertion, irregular heartbeat, leg swelling, near-syncope, orthopnea, palpitations, paroxysmal nocturnal dyspnea and syncope.   Respiratory:  Negative for cough, hemoptysis, shortness of breath, sleep disturbances due to breathing, snoring and wheezing.    Endocrine: Negative for cold intolerance, heat intolerance, polydipsia and polyuria.   Hematologic/Lymphatic: Negative for adenopathy and bleeding problem. Does not bruise/bleed easily.   Skin:  Negative for color change, itching, poor wound healing, rash and suspicious lesions.   Musculoskeletal:  Positive for arthritis, back pain and neck pain. Negative for falls, joint pain, joint swelling, muscle cramps, muscle weakness and myalgias.   Gastrointestinal:  Negative for bloating, abdominal pain, change in bowel habit, constipation, flatus, heartburn, hematemesis, hematochezia, hemorrhoids, jaundice, melena, nausea and vomiting.   Genitourinary:  Negative for bladder incontinence, decreased libido, frequency, hematuria, hesitancy and urgency.   Neurological:  Negative for brief paralysis, difficulty with concentration, excessive daytime sleepiness, dizziness, focal weakness, headaches, light-headedness, loss of balance, numbness, vertigo and weakness.   Psychiatric/Behavioral:  Negative for altered mental status, depression and memory loss. The patient does not have insomnia and is not nervous/anxious.    Allergic/Immunologic: Negative for environmental allergies, hives and persistent infections.          Objective     Physical Exam  Vitals and nursing note  "reviewed.   Constitutional:       Appearance: She is well-developed.      Comments: BP (!) 142/76   Pulse (!) 57   Ht 5' 3" (1.6 m)   Wt 67.3 kg (148 lb 4.8 oz)   SpO2 98%   BMI 26.27 kg/m²   Wearing a neck brace   HENT:      Head: Normocephalic and atraumatic.      Right Ear: External ear normal.      Left Ear: External ear normal.      Nose: Nose normal.   Eyes:      General: Lids are normal. No scleral icterus.        Right eye: No discharge.         Left eye: No discharge.      Conjunctiva/sclera: Conjunctivae normal.      Right eye: No hemorrhage.     Pupils: Pupils are equal, round, and reactive to light.   Neck:      Thyroid: No thyromegaly.      Vascular: No JVD.      Trachea: No tracheal deviation.   Cardiovascular:      Rate and Rhythm: Normal rate and regular rhythm.      Pulses: Intact distal pulses.      Heart sounds: Normal heart sounds. No murmur heard.     No friction rub. No gallop.   Pulmonary:      Effort: Pulmonary effort is normal. No respiratory distress.      Breath sounds: Normal breath sounds. No wheezing or rales.   Chest:      Chest wall: No tenderness.   Breasts:     Breasts are symmetrical.   Abdominal:      General: Bowel sounds are normal. There is no distension.      Palpations: Abdomen is soft. There is no hepatomegaly or mass.      Tenderness: There is no abdominal tenderness. There is no guarding or rebound.   Musculoskeletal:         General: No tenderness. Normal range of motion.      Cervical back: Normal range of motion and neck supple.   Lymphadenopathy:      Cervical: No cervical adenopathy.   Skin:     General: Skin is warm and dry.      Coloration: Skin is not pale.      Findings: No erythema or rash.   Neurological:      Mental Status: She is alert and oriented to person, place, and time.      Cranial Nerves: No cranial nerve deficit.      Coordination: Coordination normal.      Deep Tendon Reflexes: Reflexes are normal and symmetric. Reflexes normal.   Psychiatric:   "       Behavior: Behavior normal.         Thought Content: Thought content normal.         Judgment: Judgment normal.            Assessment and Plan     1. Athscl heart disease of native coronary artery w/o ang pctrs stable   2. Chronic diastolic congestive heart failure compensated   3. Coronary artery disease of bypass graft of native heart with stable angina pectoris    4. Hypertensive heart disease with heart failure improved   5. S/P CABG (coronary artery bypass graft)    6. S/P PTCA (percutaneous transluminal coronary angioplasty)    7      HLD much improved    Plan:  The current medical regimen is effective;  continue present plan and medications.    No orders of the defined types were placed in this encounter.    Follow up in about 6 months (around 1/29/2026).     Advance Care Planning     Date: 07/29/2025